# Patient Record
Sex: FEMALE | Race: WHITE | Employment: OTHER | URBAN - METROPOLITAN AREA
[De-identification: names, ages, dates, MRNs, and addresses within clinical notes are randomized per-mention and may not be internally consistent; named-entity substitution may affect disease eponyms.]

---

## 2017-04-29 ENCOUNTER — APPOINTMENT (OUTPATIENT)
Dept: GENERAL RADIOLOGY | Facility: CLINIC | Age: 62
End: 2017-04-29
Attending: EMERGENCY MEDICINE
Payer: COMMERCIAL

## 2017-04-29 ENCOUNTER — HOSPITAL ENCOUNTER (EMERGENCY)
Facility: CLINIC | Age: 62
Discharge: HOME | End: 2017-04-29
Attending: EMERGENCY MEDICINE | Admitting: EMERGENCY MEDICINE
Payer: COMMERCIAL

## 2017-04-29 ENCOUNTER — APPOINTMENT (OUTPATIENT)
Dept: CT IMAGING | Facility: CLINIC | Age: 62
End: 2017-04-29
Attending: EMERGENCY MEDICINE
Payer: COMMERCIAL

## 2017-04-29 VITALS
TEMPERATURE: 97.6 F | BODY MASS INDEX: 22.53 KG/M2 | HEIGHT: 64 IN | WEIGHT: 132 LBS | SYSTOLIC BLOOD PRESSURE: 158 MMHG | HEART RATE: 80 BPM | DIASTOLIC BLOOD PRESSURE: 91 MMHG | OXYGEN SATURATION: 97 % | RESPIRATION RATE: 18 BRPM

## 2017-04-29 DIAGNOSIS — I15.9 SECONDARY HYPERTENSION: ICD-10-CM

## 2017-04-29 LAB
ANION GAP SERPL CALCULATED.3IONS-SCNC: 5 MMOL/L (ref 3–14)
BASOPHILS # BLD AUTO: 0 10E9/L (ref 0–0.2)
BASOPHILS NFR BLD AUTO: 0.6 %
BUN SERPL-MCNC: 15 MG/DL (ref 7–30)
CALCIUM SERPL-MCNC: 8.8 MG/DL (ref 8.5–10.1)
CHLORIDE SERPL-SCNC: 107 MMOL/L (ref 94–109)
CO2 SERPL-SCNC: 30 MMOL/L (ref 20–32)
CREAT SERPL-MCNC: 0.65 MG/DL (ref 0.52–1.04)
DIFFERENTIAL METHOD BLD: NORMAL
EOSINOPHIL # BLD AUTO: 0 10E9/L (ref 0–0.7)
EOSINOPHIL NFR BLD AUTO: 0 %
ERYTHROCYTE [DISTWIDTH] IN BLOOD BY AUTOMATED COUNT: 12.3 % (ref 10–15)
GFR SERPL CREATININE-BSD FRML MDRD: ABNORMAL ML/MIN/1.7M2
GLUCOSE SERPL-MCNC: 103 MG/DL (ref 70–99)
HCT VFR BLD AUTO: 38 % (ref 35–47)
HGB BLD-MCNC: 12.8 G/DL (ref 11.7–15.7)
IMM GRANULOCYTES # BLD: 0 10E9/L (ref 0–0.4)
IMM GRANULOCYTES NFR BLD: 0.9 %
LYMPHOCYTES # BLD AUTO: 0.9 10E9/L (ref 0.8–5.3)
LYMPHOCYTES NFR BLD AUTO: 18.4 %
MCH RBC QN AUTO: 30.7 PG (ref 26.5–33)
MCHC RBC AUTO-ENTMCNC: 33.7 G/DL (ref 31.5–36.5)
MCV RBC AUTO: 91 FL (ref 78–100)
MONOCYTES # BLD AUTO: 0.2 10E9/L (ref 0–1.3)
MONOCYTES NFR BLD AUTO: 4.8 %
NEUTROPHILS # BLD AUTO: 3.5 10E9/L (ref 1.6–8.3)
NEUTROPHILS NFR BLD AUTO: 75.3 %
NRBC # BLD AUTO: 0 10*3/UL
NRBC BLD AUTO-RTO: 0 /100
PLATELET # BLD AUTO: 215 10E9/L (ref 150–450)
POTASSIUM SERPL-SCNC: 3.5 MMOL/L (ref 3.4–5.3)
RBC # BLD AUTO: 4.17 10E12/L (ref 3.8–5.2)
SODIUM SERPL-SCNC: 142 MMOL/L (ref 133–144)
TROPONIN I BLD-MCNC: 0 UG/L (ref 0–0.1)
WBC # BLD AUTO: 4.6 10E9/L (ref 4–11)

## 2017-04-29 PROCEDURE — 70450 CT HEAD/BRAIN W/O DYE: CPT

## 2017-04-29 PROCEDURE — 85025 COMPLETE CBC W/AUTO DIFF WBC: CPT | Performed by: EMERGENCY MEDICINE

## 2017-04-29 PROCEDURE — 99285 EMERGENCY DEPT VISIT HI MDM: CPT | Mod: 25

## 2017-04-29 PROCEDURE — 80048 BASIC METABOLIC PNL TOTAL CA: CPT | Performed by: EMERGENCY MEDICINE

## 2017-04-29 PROCEDURE — 71020 XR CHEST 2 VW: CPT

## 2017-04-29 PROCEDURE — 84484 ASSAY OF TROPONIN QUANT: CPT

## 2017-04-29 PROCEDURE — 93005 ELECTROCARDIOGRAM TRACING: CPT

## 2017-04-29 RX ORDER — LISINOPRIL 10 MG/1
10 TABLET ORAL DAILY
Qty: 14 TABLET | Refills: 0 | Status: SHIPPED | OUTPATIENT
Start: 2017-04-29 | End: 2017-05-01

## 2017-04-29 ASSESSMENT — ENCOUNTER SYMPTOMS
HEADACHES: 1
SHORTNESS OF BREATH: 0

## 2017-04-29 NOTE — DISCHARGE INSTRUCTIONS
Discharge Instructions: Taking Your Blood Pressure  Blood pressure is the force of blood as it moves from the heart through the blood vessels. You can take your own blood pressure reading using a digital monitor. Take readings as often as your health care provider instructs. Take your readings each time in the same way, using the same arm. Here are guidelines for taking your blood pressure.  1. Relax      Wait at least a half hour after smoking, eating, or exercising. Do not drink coffee, tea, soda, or other caffeinated beverages before checking your blood pressure.     Sit comfortably at a table. Place the monitor near you.    Rest for a few minutes before you begin.  2. Wrap the cuff      Place your arm on the table, palm up. Put your arm in a position that is level with your heart. Wrap the cuff around your upper arm, just above your elbow. It s best to wrap the cuff on bare skin, not over clothing.    Make sure your cuff fits. If it doesn t wrap around your upper arm, order a larger cuff. A cuff that is too large or too small can result in an inaccurate blood pressure reading.  3. Inflate the cuff      Pump the cuff until the scale reads 200. If you have a self-inflating cuff, push the button that starts the pump.    The cuff will tighten, then loosen.    The numbers will change. When they stop changing, your blood pressure reading will appear.    If you get a reading that is too high or too low for you, relax for a few minutes. Then do the test again.    4. Write down the results    Write down your blood pressure numbers. Fadi the date and time. Keep your results in one place, such as a notebook.    Remove the cuff from your arm. Turn off the machine.       0298-8729 [employer]. [address] [place] [address], [place] [address]. All rights reserved. This information is not intended as a substitute for professional medical care. Always follow your healthcare professional's instructions.          High  Blood Pressure, New, Begin Treatment  Your blood pressure was high enough today to start treatment with medicines. Often health care providers don t know what causes high blood pressure (hypertension). But it can be controlled with lifestyle changes and medicines. High blood pressure usually has no symptoms. But it can sometimes cause headache, dizziness, blurred vision, a rushing sound in your ears, chest pain, or shortness of breath. But even without symptoms, high blood pressure that s not treated raises your risk for heart attack and stroke. High blood pressure is a serious health risk and shouldn t be ignored.    A blood pressure reading is made up of two numbers: a higher number over a lower number. The top number is the systolic pressure. The bottom number is the diastolic pressure. A normal blood pressure is less than 120 over less than 80.  High blood pressure is when either the top number is 140 or higher, or the bottom number is 90 or higher. This must be the result when taking your blood pressure a number of times. The blood pressures between normal and high are called prehypertension.  Home care  If you have high blood pressure, you should do what is listed below to lower your blood pressure. If you are taking medicines for high blood pressure, these methods may reduce or end your need for medicines in the future.    Begin a weight-loss program if you are overweight.    Cut back on how much salt you get in your diet. Here s how to do this:    Don t eat foods that have a lot of salt. These include olives, pickles, smoked meats, and salted potato chips.    Don t add salt to your food at the table.    Use only small amounts of salt when cooking.    Begin an exercise program. Talk with your health care provider about the type of exercise program that would be best for you. It doesn't have to be hard. Even brisk walking for 20 minutes 3 times a week is a good form of exercise.    Don t take medicines that  have heart stimulants. This includes many cold and sinus decongestant pills and sprays, as well as diet pills. Check the warnings about hypertension on the label. Stimulants such as amphetamine or cocaine could be lethal for someone with high blood pressure. Never take these.    Limit how much caffeine you get in your diet. Switch to caffeine-free products.    Stop smoking. If you are a long-time smoker, this can be hard. Enroll in a stop-smoking program to make it more likely that you will quit for good.    Learn how to handle stress. This is an important part of any program to lower blood pressure. Learn about relaxation methods like meditation, yoga, or biofeedback.    If your provider prescribed medicines, take them exactly as directed. Missing doses may cause your blood pressure get out of control.    Consider buying an automatic blood pressure machine. You can get one of these at most pharmacies. Use this to watch your blood pressure at home. Give the results to your provider.  Follow-up care  Because a new blood pressure medicine was started today, it s important that you have your blood pressure rechecked. This is to make sure that the medicine is working and that you have no serious side effects. Unless told otherwise, follow up with your health care provider or this facility within the next 3 days.  When to seek medical advice  Call your health care provider right away if any of these occur:    Chest pain or shortness of breath    Severe headache    Throbbing or rushing sound in the ears    Nosebleed    Sudden severe pain in your belly (abdomen)    Extreme drowsiness, confusion, or fainting    Dizziness or dizziness with a spinning sensation (vertigo)    Weakness of an arm or leg or one side of the face    You have problems speaking or seeing     6765-1723 [employer]. [address] [place] [address], [place] [address]. All rights reserved. This information is not intended as a substitute for  professional medical care. Always follow your healthcare professional's instructions.

## 2017-04-29 NOTE — ED AVS SNAPSHOT
[facility] Emergency Department    [address]    [facility] [address]    Phone:  [phone]    Fax:  [phone]                                       Le Vora   MRN: [id number]    Department:  [facility] Emergency Department   Date of Visit:  4/29/2017           After Visit Summary Signature Page     I have received my discharge instructions, and my questions have been answered. I have discussed any challenges I see with this plan with the nurse or doctor.    ..........................................................................................................................................  Patient/Patient Representative Signature      ..........................................................................................................................................  Patient Representative Print Name and Relationship to Patient    ..................................................               ................................................  Date                                            Time    ..........................................................................................................................................  Reviewed by Signature/Title    ...................................................              ..............................................  Date                                                            Time

## 2017-04-29 NOTE — ED AVS SNAPSHOT
[facility] Emergency Department    [address]    [facility] [address]    Phone:  [phone]    Fax:  [phone]                                       Le Vora   MRN: [id number]    Department:  [facility] Emergency Department   Date of Visit:  4/29/2017           Patient Information     Date Of Birth          1955        Your diagnoses for this visit were:     Secondary hypertension        You were seen by Pablo Medina MD.      Follow-up Information     Follow up with Brii Sherwood MD In 2 days.    Specialty:  Internal Medicine    Contact information:    [facility]  [address]  [facility] [address]  [phone]          Discharge Instructions         Discharge Instructions: Taking Your Blood Pressure  Blood pressure is the force of blood as it moves from the heart through the blood vessels. You can take your own blood pressure reading using a digital monitor. Take readings as often as your health care provider instructs. Take your readings each time in the same way, using the same arm. Here are guidelines for taking your blood pressure.  1. Relax      Wait at least a half hour after smoking, eating, or exercising. Do not drink coffee, tea, soda, or other caffeinated beverages before checking your blood pressure.     Sit comfortably at a table. Place the monitor near you.    Rest for a few minutes before you begin.  2. Wrap the cuff      Place your arm on the table, palm up. Put your arm in a position that is level with your heart. Wrap the cuff around your upper arm, just above your elbow. It s best to wrap the cuff on bare skin, not over clothing.    Make sure your cuff fits. If it doesn t wrap around your upper arm, order a larger cuff. A cuff that is too large or too small can result in an inaccurate blood pressure reading.  3. Inflate the cuff      Pump the cuff until the scale reads 200. If you have a self-inflating  cuff, push the button that starts the pump.    The cuff will tighten, then loosen.    The numbers will change. When they stop changing, your blood pressure reading will appear.    If you get a reading that is too high or too low for you, relax for a few minutes. Then do the test again.    4. Write down the results    Write down your blood pressure numbers. Fadi the date and time. Keep your results in one place, such as a notebook.    Remove the cuff from your arm. Turn off the machine.       3872-9084 [employer]. [address] [place] [address]. All rights reserved. This information is not intended as a substitute for professional medical care. Always follow your healthcare professional's instructions.          High Blood Pressure, New, Begin Treatment  Your blood pressure was high enough today to start treatment with medicines. Often health care providers don t know what causes high blood pressure (hypertension). But it can be controlled with lifestyle changes and medicines. High blood pressure usually has no symptoms. But it can sometimes cause headache, dizziness, blurred vision, a rushing sound in your ears, chest pain, or shortness of breath. But even without symptoms, high blood pressure that s not treated raises your risk for heart attack and stroke. High blood pressure is a serious health risk and shouldn t be ignored.    A blood pressure reading is made up of two numbers: a higher number over a lower number. The top number is the systolic pressure. The bottom number is the diastolic pressure. A normal blood pressure is less than 120 over less than 80.  High blood pressure is when either the top number is 140 or higher, or the bottom number is 90 or higher. This must be the result when taking your blood pressure a number of times. The blood pressures between normal and high are called prehypertension.  Home care  If you have high blood pressure, you should do what is listed below to  lower your blood pressure. If you are taking medicines for high blood pressure, these methods may reduce or end your need for medicines in the future.    Begin a weight-loss program if you are overweight.    Cut back on how much salt you get in your diet. Here s how to do this:    Don t eat foods that have a lot of salt. These include olives, pickles, smoked meats, and salted potato chips.    Don t add salt to your food at the table.    Use only small amounts of salt when cooking.    Begin an exercise program. Talk with your health care provider about the type of exercise program that would be best for you. It doesn't have to be hard. Even brisk walking for 20 minutes 3 times a week is a good form of exercise.    Don t take medicines that have heart stimulants. This includes many cold and sinus decongestant pills and sprays, as well as diet pills. Check the warnings about hypertension on the label. Stimulants such as amphetamine or cocaine could be lethal for someone with high blood pressure. Never take these.    Limit how much caffeine you get in your diet. Switch to caffeine-free products.    Stop smoking. If you are a long-time smoker, this can be hard. Enroll in a stop-smoking program to make it more likely that you will quit for good.    Learn how to handle stress. This is an important part of any program to lower blood pressure. Learn about relaxation methods like meditation, yoga, or biofeedback.    If your provider prescribed medicines, take them exactly as directed. Missing doses may cause your blood pressure get out of control.    Consider buying an automatic blood pressure machine. You can get one of these at most pharmacies. Use this to watch your blood pressure at home. Give the results to your provider.  Follow-up care  Because a new blood pressure medicine was started today, it s important that you have your blood pressure rechecked. This is to make sure that the medicine is working and that you have  no serious side effects. Unless told otherwise, follow up with your health care provider or this facility within the next 3 days.  When to seek medical advice  Call your health care provider right away if any of these occur:    Chest pain or shortness of breath    Severe headache    Throbbing or rushing sound in the ears    Nosebleed    Sudden severe pain in your belly (abdomen)    Extreme drowsiness, confusion, or fainting    Dizziness or dizziness with a spinning sensation (vertigo)    Weakness of an arm or leg or one side of the face    You have problems speaking or seeing     7517-0499 [employer]. [address] [place] [address]. All rights reserved. This information is not intended as a substitute for professional medical care. Always follow your healthcare professional's instructions.          Future Appointments        Provider Department Dept Phone Center    5/1/2017 3:00 PM Coleman Morgan MD [facility] [phone] [place]      24 Hour Appointment Hotline       To make an appointment at any [facility], call [phone] ([phone]). If you don't have a family doctor or clinic, we will help you find one. [facility] ([facility] are conveniently located to serve the needs of you and your family.             Review of your medicines      START taking        Dose / Directions Last dose taken    lisinopril 10 MG tablet   Commonly known as:  PRINIVIL/ZESTRIL   Dose:  10 mg   Quantity:  14 tablet        Take 1 tablet (10 mg) by mouth daily for 14 doses   Refills:  0          Our records show that you are taking the medicines listed below. If these are incorrect, please call your family doctor or clinic.        Dose / Directions Last dose taken    ADVIL PO   Dose:  200 mg        Take 200 mg by mouth   Refills:  0        aspirin 325 MG tablet        Take by mouth daily   Refills:  0        CALTRATE 600+D 600-400 MG-UNIT Chew   Dose:  1 chew tab   Quantity:  180 tablet    Generic drug:  calcium carbonate-vitamin D        Take 1 chew tab by mouth daily.   Refills:  3        conjugated estrogens cream   Commonly known as:  PREMARIN   Dose:  1 g   Quantity:  42.5 g        Place 1 g vaginally twice a week   Refills:  12        levothyroxine 100 MCG tablet   Commonly known as:  SYNTHROID/LEVOTHROID   Dose:  100 mcg   Quantity:  90 tablet        Take 1 tablet (100 mcg) by mouth daily   Refills:  3        TYLENOL PO   Dose:  325 mg        Take 325 mg by mouth   Refills:  0                Prescriptions were sent or printed at these locations (1 Prescription)                   Other Prescriptions                Printed at Department/Unit printer (1 of 1)         lisinopril (PRINIVIL/ZESTRIL) 10 MG tablet                Procedures and tests performed during your visit     Basic metabolic panel    CBC + differential    CT Head w/o Contrast    Troponin POCT    XR Chest 2 Views      Orders Needing Specimen Collection     None      Pending Results     No orders found from 4/27/2017 to 4/30/2017.            Pending Culture Results     No orders found from 4/27/2017 to 4/30/2017.            Test Results From Your Hospital Stay        4/29/2017 11:55 AM      Component Results     Component Value Ref Range & Units Status    WBC 4.6 4.0 - 11.0 10e9/L Final    RBC Count 4.17 3.8 - 5.2 10e12/L Final    Hemoglobin 12.8 11.7 - 15.7 g/dL Final    Hematocrit 38.0 35.0 - 47.0 % Final    MCV 91 78 - 100 fl Final    MCH 30.7 26.5 - 33.0 pg Final    MCHC 33.7 31.5 - 36.5 g/dL Final    RDW 12.3 10.0 - 15.0 % Final    Platelet Count 215 150 - 450 10e9/L Final    Diff Method Automated Method  Final    % Neutrophils 75.3 % Final    % Lymphocytes 18.4 % Final    % Monocytes 4.8 % Final    % Eosinophils 0.0 % Final    % Basophils 0.6 % Final    % Immature Granulocytes 0.9 % Final    Nucleated RBCs 0 0 /100 Final    Absolute Neutrophil 3.5 1.6 - 8.3 10e9/L Final    Absolute Lymphocytes 0.9 0.8 - 5.3 10e9/L Final     Absolute Monocytes 0.2 0.0 - 1.3 10e9/L Final    Absolute Eosinophils 0.0 0.0 - 0.7 10e9/L Final    Absolute Basophils 0.0 0.0 - 0.2 10e9/L Final    Abs Immature Granulocytes 0.0 0 - 0.4 10e9/L Final    Absolute Nucleated RBC 0.0  Final         4/29/2017 12:09 PM      Component Results     Component Value Ref Range & Units Status    Sodium 142 133 - 144 mmol/L Final    Potassium 3.5 3.4 - 5.3 mmol/L Final    Chloride 107 94 - 109 mmol/L Final    Carbon Dioxide 30 20 - 32 mmol/L Final    Anion Gap 5 3 - 14 mmol/L Final    Glucose 103 (H) 70 - 99 mg/dL Final    Urea Nitrogen 15 7 - 30 mg/dL Final    Creatinine 0.65 0.52 - 1.04 mg/dL Final    GFR Estimate >90  Non  GFR Calc   >60 mL/min/1.7m2 Final    GFR Estimate If Black >90   GFR Calc   >60 mL/min/1.7m2 Final    Calcium 8.8 8.5 - 10.1 mg/dL Final         4/29/2017 12:27 PM      Narrative     CT HEAD W/O CONTRAST  4/29/2017 12:23 PM    HISTORY:  headache hypertension    Radiation dose for this scan was reduced using automated exposure  control, adjustment of the mA and/or kV according to patient size, or  iterative reconstruction technique.    FINDINGS:  Cerebral ventricles and cortical sulci appear within normal  limits. There is no evidence of acute intracranial hemorrhage. There  is no mass effect or shift of the midline. There is no definite CT  evidence of acute stroke. The visualized portions of the paranasal  sinuses are clear. The osseous calvarium appears within normal limits  as imaged transversely.        Impression     IMPRESSION:  Negative unenhanced head CT.    WANDA LARKIN MD         4/29/2017 12:48 PM      Narrative     XR CHEST 2 VW 4/29/2017 12:43 PM     HISTORY: htn        Impression     IMPRESSION: Negative exam.    WANDA LARKIN MD         4/29/2017 12:06 PM      Component Results     Component Value Ref Range & Units Status    Troponin I 0.00 0.00 - 0.10 ug/L Final                Clinical Quality Measure: Blood  Pressure Screening     Your blood pressure was checked while you were in the emergency department today. The last reading we obtained was  BP: (!) 158/91 (Simultaneous filing. User may not have seen previous data.) . Please read the guidelines below about what these numbers mean and what you should do about them.  If your systolic blood pressure (the top number) is less than 120 and your diastolic blood pressure (the bottom number) is less than 80, then your blood pressure is normal. There is nothing more that you need to do about it.  If your systolic blood pressure (the top number) is 120-139 or your diastolic blood pressure (the bottom number) is 80-89, your blood pressure may be higher than it should be. You should have your blood pressure rechecked within a year by a primary care provider.  If your systolic blood pressure (the top number) is 140 or greater or your diastolic blood pressure (the bottom number) is 90 or greater, you may have high blood pressure. High blood pressure is treatable, but if left untreated over time it can put you at risk for heart attack, stroke, or kidney failure. You should have your blood pressure rechecked by a primary care provider within the next 4 weeks.  If your provider in the emergency department today gave you specific instructions to follow-up with your doctor or provider even sooner than that, you should follow that instruction and not wait for up to 4 weeks for your follow-up visit.        Thank you for choosing [facility]       Thank you for choosing [facility] for your care. Our goal is always to provide you with excellent care. Hearing back from our patients is one way we can continue to improve our services. Please take a few minutes to complete the written survey that you may receive in the mail after you visit with us. Thank you!        [employer] Information     [employer] gives you secure access to your electronic health record. If you see a primary care provider, you can  also send messages to your care team and make appointments. If you have questions, please call your primary care clinic.  If you do not have a primary care provider, please call [phone] and they will assist you.        Care EveryWhere ID     This is your Care EveryWhere ID. This could be used by other organizations to access your [facility] medical records  [id number]        After Visit Summary       This is your record. Keep this with you and show to your community pharmacist(s) and doctor(s) at your next visit.

## 2017-04-29 NOTE — ED NOTES
Patient presents with complaints of hypertension. Patient states that she has no history of hypertension. Also complaints of headache as well. ABC intact without need for intervention.

## 2017-04-29 NOTE — ED PROVIDER NOTES
History     Chief Complaint:  Hypertension      HPI   Le Vora is a 61 year old female who presents with concerns for hypertension. The patient developed a throbbing headache yesterday. At work today the patient checked her shekhar and found it to be 180/99. Patient measured her blood pressure three times and presented to the ED for evaluation. She states that she has been thinking about her blood pressure frequently as of late. She has spoke with her PCP about it about 9 months ago about this but is not currently on any antihypertensives. Per chart review the patient's blood pressure was 174/84 in 10/2016 during an ED visit. She states that it was in the 150's about 9 months ago but she was not been checking it regularly since. The patient denies any chest pain, shortness of breath vision changes or any other symptoms.     Allergies:  Carbocaine [Mepivacaine]  Tetracycline      Medications:    conjugated estrogens (PREMARIN) vaginal cream  levothyroxine (SYNTHROID,LEVOTHROID) 100 MCG tablet  aspirin 325 MG tablet  Acetaminophen (TYLENOL PO)  Ibuprofen (ADVIL PO)  calcium carbonate-vitamin D (CALTRATE 600+D) 600-400 MG-UNIT CHEW    Past Medical History:    Basal cell carcinoma   Mitral valve disorder  Slow transit constipation   Tension headache   Hypothyroidism     Past Surgical History:    History reviewed. No pertinent past surgical history.     Family History:    Mother - diabetes, CAD  Father - myeloma   Brother - hypertension     Social History:  Marital Status:     Smoking status: Never smoker  Alcohol use: No    PCP: Dr. Sherwood    Review of Systems   Eyes: Negative for visual disturbance.   Respiratory: Negative for shortness of breath.    Cardiovascular: Negative for chest pain.   Neurological: Positive for headaches.   All other systems reviewed and are negative.      Physical Exam     Patient Vitals for the past 24 hrs:   BP Temp Pulse Heart Rate Resp SpO2 Height Weight   04/29/17 1303 (!)  "158/91 - - - - 97 % - -   04/29/17 1155 (!) 164/94 - - - - 99 % - -   04/29/17 1140 160/89 - - - - 98 % - -   04/29/17 1131 (!) 181/103 97.6  F (36.4  C) 80 80 18 99 % 1.626 m (5' 4\") 59.9 kg (132 lb)   04/29/17 1130 (!) 181/103 - - - - - - -      Physical Exam  Vital signs and nursing notes reviewed.     Constitutional: laying on gurney appears comfortable  HENT: Oropharynx is clear and moist  Eyes: Conjunctivae are normal bilaterally. Pupils equal  Neck: normal range of motion. No carotid bruits.   Cardiovascular: Normal rate, regular rhythm, normal heart sounds. Equal radial pulses.   Pulmonary/Chest: Effort normal and breath sounds normal. No respiratory distress.   Abdominal: Soft. Bowel sounds are normal. No tenderness to palpation. No rebound or guarding.   Musculoskeletal: No joint swelling or edema.   Neurological: Alert and oriented. No focal weakness. Normal deep tendon reflexes. No encephalopathy.   Skin: Skin is warm and dry. No rash noted.   Psych: normal affect     Emergency Department Course     Imaging:  Radiographic findings were communicated with the patient who voiced understanding of the findings.    XR Chest 2 views  IMPRESSION: Negative exam.  Report per radiology.     CT Head without contrast  IMPRESSION:  Negative unenhanced head CT.  Report per radiology.     Laboratory:  CBC:  WBC 4.6, HGB 12.8, , otherwise WNL   BMP: Glucose 103 (H), otherwise WNL (Creatinine 0.65)  Troponin: 0.00     Emergency Department Course:  Nursing notes and vitals reviewed.  (5504) I performed an exam of the patient as documented above.    Blood was drawn from the patient. This was sent for laboratory testing, findings above.   The patient was sent for a chest xray and head CT while in the emergency department, findings above.      Findings and plan explained to the patient. Patient discharged home with instructions regarding supportive care, medications, and reasons to return. The importance of close " follow-up was reviewed. The patient was prescribed lisinopril.      Impression & Plan      Medical Decision Making:  Le Vora is a 61 year old female who presents with high blood pressure and intermittent throbbing sensation in her head and not feeling normal at work today. On examination she had a normal neurologic exam. She had no encephalopathy or other concerning findings. Blood pressure initially was in the 180/110's but with continued monitoring in the ED it dropped to the upper 150's systolically without medication treatment. Multiple tests were obtained which showed no signs of hypertensive strain or emergency. I discussed with the patient at length about her blood pressure and she has likely been having higher blood pressure readings over the last several months. I recommended starting on low dose lisinopril daily and have her follow up with her PCP on Monday. Patient feels better as time has past here in the ED and I felt she could be safely discharged home. Lab tests, chest xray and CT are all unremarkable. She has no chest pain, shortness of breath or any other concerning findings. I felt she could be safely discharged to home. Patient understands the plan and knows reasons to return.     Diagnosis:    ICD-10-CM   1. Secondary hypertension I15.9       Disposition:  Patient is discharged to home.     Discharge Medication List as of 4/29/2017  1:31 PM      START taking these medications    Details   lisinopril (PRINIVIL/ZESTRIL) 10 MG tablet Take 1 tablet (10 mg) by mouth daily for 14 doses, Disp-14 tablet, R-0, Local Print               Tim Aguirre  4/29/2017   [facility] EMERGENCY DEPARTMENT    I, Tim Aguirre, am serving as a scribe on 4/29/2017 at 11:48 AM to personally document services performed by Dr. Medina based on my observations and the provider's statements to me.        Pablo Medina MD  04/29/17 3443

## 2017-05-01 ENCOUNTER — OFFICE VISIT (OUTPATIENT)
Dept: INTERNAL MEDICINE | Facility: CLINIC | Age: 62
End: 2017-05-01
Payer: COMMERCIAL

## 2017-05-01 VITALS
TEMPERATURE: 98.8 F | BODY MASS INDEX: 23.52 KG/M2 | HEART RATE: 97 BPM | OXYGEN SATURATION: 97 % | DIASTOLIC BLOOD PRESSURE: 80 MMHG | HEIGHT: 64 IN | SYSTOLIC BLOOD PRESSURE: 130 MMHG | WEIGHT: 137.8 LBS

## 2017-05-01 DIAGNOSIS — I10 ESSENTIAL HYPERTENSION: Primary | ICD-10-CM

## 2017-05-01 PROCEDURE — 99213 OFFICE O/P EST LOW 20 MIN: CPT | Performed by: FAMILY MEDICINE

## 2017-05-01 RX ORDER — LISINOPRIL 10 MG/1
10 TABLET ORAL DAILY
Qty: 30 TABLET | Refills: 2 | Status: SHIPPED | OUTPATIENT
Start: 2017-05-01 | End: 2017-10-02 | Stop reason: DRUGHIGH

## 2017-05-01 NOTE — PROGRESS NOTES
"CHIEF COMPLAINT    F/U elevated BP.      HISTORY    Le developed KNIGHT and hypertension. She was seen in E R 4-29. W/U showed neg Troponin and normal Glu and renal fcn. She was started on Lisinopril 10 mg which she is tolerating well.She has minimal HA now.    Patient Active Problem List   Diagnosis     CARDIOVASCULAR SCREENING; LDL GOAL LESS THAN 160     Advanced directives, counseling/discussion     Osteoporosis     Hypothyroidism due to acquired atrophy of thyroid     Current Outpatient Prescriptions   Medication Sig Dispense Refill     lisinopril (PRINIVIL/ZESTRIL) 10 MG tablet Take 1 tablet (10 mg) by mouth daily 30 tablet 2     conjugated estrogens (PREMARIN) vaginal cream Place 1 g vaginally twice a week 42.5 g 12     levothyroxine (SYNTHROID,LEVOTHROID) 100 MCG tablet Take 1 tablet (100 mcg) by mouth daily 90 tablet 3     aspirin 325 MG tablet Take by mouth daily       Acetaminophen (TYLENOL PO) Take 325 mg by mouth       Ibuprofen (ADVIL PO) Take 200 mg by mouth       calcium carbonate-vitamin D (CALTRATE 600+D) 600-400 MG-UNIT CHEW Take 1 chew tab by mouth daily. 180 tablet 3     [DISCONTINUED] lisinopril (PRINIVIL/ZESTRIL) 10 MG tablet Take 1 tablet (10 mg) by mouth daily for 14 doses 14 tablet 0       REVIEW OF SYSTEMS    No CP  No SOB  No palpitation      Past Medical History:   Diagnosis Date     Basal cell carcinoma nos 2010     Mitral valve disorders     reported MVP; no regurg     Slow transit constipation     partial intussecption seen on sigmoid     Tension headache      Unspecified hypothyroidism        EXAM  /80 (BP Location: Right arm, Patient Position: Chair, Cuff Size: Adult Regular)  Pulse 97  Temp 98.8  F (37.1  C) (Oral)  Ht 5' 4\" (1.626 m)  Wt 137 lb 12.8 oz (62.5 kg)  LMP 03/01/2004  SpO2 97%  BMI 23.65 kg/m2      (I10) Essential hypertension  (primary encounter diagnosis)  Comment: med discussed  Plan: lisinopril (PRINIVIL/ZESTRIL) 10 MG tablet            Monitor BP 2-3 " times a week.    See Dr Sherwood in 1-2 months.

## 2017-05-01 NOTE — NURSING NOTE
"Chief Complaint   Patient presents with     RECHECK     She went to the [facility] 4/29/17 for elevated BP. She's feeling better now. Was put on BP med.       Initial /80 (BP Location: Right arm, Patient Position: Chair, Cuff Size: Adult Regular)  Pulse 97  Temp 98.8  F (37.1  C) (Oral)  Ht 5' 4\" (1.626 m)  Wt 137 lb 12.8 oz (62.5 kg)  LMP 03/01/2004  SpO2 97%  BMI 23.65 kg/m2 Estimated body mass index is 23.65 kg/(m^2) as calculated from the following:    Height as of this encounter: 5' 4\" (1.626 m).    Weight as of this encounter: 137 lb 12.8 oz (62.5 kg).  Medication Reconciliation: complete   Syl Fenton MA      "

## 2017-05-01 NOTE — MR AVS SNAPSHOT
"              After Visit Summary   5/1/2017    Le Vora    MRN: [id number]           Patient Information     Date Of Birth          1955        Visit Information        Provider Department      5/1/2017 3:00 PM Coleman Morgan MD [facility]        Today's Diagnoses     Essential hypertension    -  1      Care Instructions    Monitor BP 2-3 times a week.    See Dr Sherwood in 1-2 months.        Follow-ups after your visit        Who to contact     If you have questions or need follow up information about today's clinic visit or your schedule please contact [facility] directly at [phone].  Normal or non-critical lab and imaging results will be communicated to you by Imcompanyhart, letter or phone within 4 business days after the clinic has received the results. If you do not hear from us within 7 days, please contact the clinic through Imcompanyhart or phone. If you have a critical or abnormal lab result, we will notify you by phone as soon as possible.  Submit refill requests through Moblico or call your pharmacy and they will forward the refill request to us. Please allow 3 business days for your refill to be completed.          Additional Information About Your Visit        MyChart Information     Moblico gives you secure access to your electronic health record. If you see a primary care provider, you can also send messages to your care team and make appointments. If you have questions, please call your primary care clinic.  If you do not have a primary care provider, please call [phone] and they will assist you.        Care EveryWhere ID     This is your Care EveryWhere ID. This could be used by other organizations to access your [facility] medical records  [id number]        Your Vitals Were     Pulse Temperature Height Last Period Pulse Oximetry BMI (Body Mass Index)    97 98.8  F (37.1  C) (Oral) 5' 4\" (1.626 m) 03/01/2004 97% 23.65 kg/m2       Blood Pressure from " Last 3 Encounters:   05/01/17 130/80   04/29/17 (!) 158/91   10/09/16 154/84    Weight from Last 3 Encounters:   05/01/17 137 lb 12.8 oz (62.5 kg)   04/29/17 132 lb (59.9 kg)   10/09/16 132 lb (59.9 kg)              Today, you had the following     No orders found for display         Where to get your medicines      These medications were sent to [facility] [place], [place] - [address]  [address] [facility] [address]     Phone:  [phone]     lisinopril 10 MG tablet          Primary Care Provider Office Phone # Fax #    Brii Sherwood [phone]       [facility] [address]  [facility] [address]        Thank you!     Thank you for choosing [facility]  for your care. Our goal is always to provide you with excellent care. Hearing back from our patients is one way we can continue to improve our services. Please take a few minutes to complete the written survey that you may receive in the mail after your visit with us. Thank you!             Your Updated Medication List - Protect others around you: Learn how to safely use, store and throw away your medicines at www.disposemymeds.org.          This list is accurate as of: 5/1/17  3:35 PM.  Always use your most recent med list.                   Brand Name Dispense Instructions for use    ADVIL PO      Take 200 mg by mouth       aspirin 325 MG tablet      Take by mouth daily       CALTRATE 600+D 600-400 MG-UNIT Chew   Generic drug:  calcium carbonate-vitamin D     180 tablet    Take 1 chew tab by mouth daily.       conjugated estrogens cream    PREMARIN    42.5 g    Place 1 g vaginally twice a week       levothyroxine 100 MCG tablet    SYNTHROID/LEVOTHROID    90 tablet    Take 1 tablet (100 mcg) by mouth daily       lisinopril 10 MG tablet    PRINIVIL/ZESTRIL    30 tablet    Take 1 tablet (10 mg) by mouth daily       TYLENOL PO      Take 325 mg by mouth

## 2017-06-08 ENCOUNTER — OFFICE VISIT (OUTPATIENT)
Dept: INTERNAL MEDICINE | Facility: CLINIC | Age: 62
End: 2017-06-08
Payer: COMMERCIAL

## 2017-06-08 VITALS
RESPIRATION RATE: 16 BRPM | DIASTOLIC BLOOD PRESSURE: 88 MMHG | WEIGHT: 133.5 LBS | TEMPERATURE: 97.8 F | HEART RATE: 80 BPM | OXYGEN SATURATION: 100 % | BODY MASS INDEX: 22.79 KG/M2 | SYSTOLIC BLOOD PRESSURE: 138 MMHG | HEIGHT: 64 IN

## 2017-06-08 DIAGNOSIS — I10 BENIGN ESSENTIAL HYPERTENSION: Primary | ICD-10-CM

## 2017-06-08 LAB
CREAT UR-MCNC: 68 MG/DL
MICROALBUMIN UR-MCNC: 14 MG/L
MICROALBUMIN/CREAT UR: 20.35 MG/G CR (ref 0–25)

## 2017-06-08 PROCEDURE — 82043 UR ALBUMIN QUANTITATIVE: CPT | Performed by: INTERNAL MEDICINE

## 2017-06-08 PROCEDURE — 99214 OFFICE O/P EST MOD 30 MIN: CPT | Performed by: INTERNAL MEDICINE

## 2017-06-08 RX ORDER — LISINOPRIL 10 MG/1
10 TABLET ORAL DAILY
COMMUNITY
End: 2017-06-08

## 2017-06-08 NOTE — MR AVS SNAPSHOT
"              After Visit Summary   6/8/2017    Le Vora    MRN: [id number]           Patient Information     Date Of Birth          1955        Visit Information        Provider Department      6/8/2017 10:40 AM Brii Sherwood MD [facility]        Today's Diagnoses     Benign essential hypertension    -  1       Follow-ups after your visit        Who to contact     If you have questions or need follow up information about today's clinic visit or your schedule please contact [facility] directly at [phone].  Normal or non-critical lab and imaging results will be communicated to you by MyChart, letter or phone within 4 business days after the clinic has received the results. If you do not hear from us within 7 days, please contact the clinic through [employer] or phone. If you have a critical or abnormal lab result, we will notify you by phone as soon as possible.  Submit refill requests through JewelStreet or call your pharmacy and they will forward the refill request to us. Please allow 3 business days for your refill to be completed.          Additional Information About Your Visit        MyChart Information     [employer]elStreet gives you secure access to your electronic health record. If you see a primary care provider, you can also send messages to your care team and make appointments. If you have questions, please call your primary care clinic.  If you do not have a primary care provider, please call [phone] and they will assist you.        Care EveryWhere ID     This is your Care EveryWhere ID. This could be used by other organizations to access your [facility] medical records  [id number]        Your Vitals Were     Pulse Temperature Respirations Height Last Period Pulse Oximetry    80 97.8  F (36.6  C) (Oral) 16 5' 4\" (1.626 m) 03/01/2004 100%    BMI (Body Mass Index)                   22.92 kg/m2            Blood Pressure from Last 3 Encounters:   06/08/17 138/88 "   05/01/17 130/80   04/29/17 (!) 158/91    Weight from Last 3 Encounters:   06/08/17 133 lb 8 oz (60.6 kg)   05/01/17 137 lb 12.8 oz (62.5 kg)   04/29/17 132 lb (59.9 kg)              We Performed the Following     Albumin Random Urine Quantitative          Today's Medication Changes          These changes are accurate as of: 6/8/17 12:39 PM.  If you have any questions, ask your nurse or doctor.               These medicines have changed or have updated prescriptions.        Dose/Directions    lisinopril 10 MG tablet   Commonly known as:  PRINIVIL/ZESTRIL   This may have changed:  Another medication with the same name was removed. Continue taking this medication, and follow the directions you see here.   Used for:  Essential hypertension   Changed by:  Coleman Morgan MD        Dose:  10 mg   Take 1 tablet (10 mg) by mouth daily   Quantity:  30 tablet   Refills:  2                Primary Care Provider Office Phone # Fax #    Brii Sherwood [phone]       [facility] [address]  [facility] [address]        Thank you!     Thank you for choosing [facility]  for your care. Our goal is always to provide you with excellent care. Hearing back from our patients is one way we can continue to improve our services. Please take a few minutes to complete the written survey that you may receive in the mail after your visit with us. Thank you!             Your Updated Medication List - Protect others around you: Learn how to safely use, store and throw away your medicines at www.disposemymeds.org.          This list is accurate as of: 6/8/17 12:39 PM.  Always use your most recent med list.                   Brand Name Dispense Instructions for use    ADVIL PO      Take 200 mg by mouth       aspirin 325 MG tablet      Take by mouth daily       CALTRATE 600+D 600-400 MG-UNIT Chew   Generic drug:  calcium carbonate-vitamin D     180 tablet    Take 1 chew tab by mouth daily.        conjugated estrogens cream    PREMARIN    42.5 g    Place 1 g vaginally twice a week       levothyroxine 100 MCG tablet    SYNTHROID/LEVOTHROID    90 tablet    Take 1 tablet (100 mcg) by mouth daily       lisinopril 10 MG tablet    PRINIVIL/ZESTRIL    30 tablet    Take 1 tablet (10 mg) by mouth daily       TYLENOL PO      Take 325 mg by mouth       VITAMIN D (CHOLECALCIFEROL) PO      Take 1,000 Units by mouth daily

## 2017-06-08 NOTE — NURSING NOTE
"Chief Complaint   Patient presents with     Recheck Medication     new on Lisinopril- f/u on BP        Initial BP (!) 150/98  Pulse 80  Temp 97.8  F (36.6  C) (Oral)  Resp 16  Ht 5' 4\" (1.626 m)  Wt 133 lb 8 oz (60.6 kg)  LMP 03/01/2004  SpO2 100%  BMI 22.92 kg/m2 Estimated body mass index is 22.92 kg/(m^2) as calculated from the following:    Height as of this encounter: 5' 4\" (1.626 m).    Weight as of this encounter: 133 lb 8 oz (60.6 kg).  Medication Reconciliation: complete      Oma Rosa CMA      "

## 2017-06-08 NOTE — PROGRESS NOTES
"  SUBJECTIVE:                                                    Le Vora is a 62 year old female who presents to clinic today for the following health issues:    She was at ED at end of April with headache and had high BP. It was still high on follow up last month. She is on lisinopril 10 mg. She is tolerating this well.   Hypertension Follow-up      Outpatient blood pressures are being checked at Work.  Results are 168/94, 138/88, 147/94.    Low Salt Diet: low salt       Amount of exercise or physical activity: 6-7 days/week for an average of 15-30 minutes- biking     Problems taking medications regularly: No    Medication side effects: none    Diet: low salt      She reports she still can hear a \"swishing\" noise in her head, mostly on the right. This is frequently when first lying down but can be sitting up. It is not associated with pain, does not check BP with it but she was having it when she went to ED.   No focal neurologic symptoms.   No other symptoms.       Patient Active Problem List   Diagnosis     CARDIOVASCULAR SCREENING; LDL GOAL LESS THAN 160     Advanced directives, counseling/discussion     Osteoporosis     Hypothyroidism due to acquired atrophy of thyroid     Benign essential hypertension     Current Outpatient Prescriptions   Medication Sig Dispense Refill     VITAMIN D, CHOLECALCIFEROL, PO Take 1,000 Units by mouth daily       lisinopril (PRINIVIL/ZESTRIL) 10 MG tablet Take 1 tablet (10 mg) by mouth daily 30 tablet 2     conjugated estrogens (PREMARIN) vaginal cream Place 1 g vaginally twice a week 42.5 g 12     levothyroxine (SYNTHROID,LEVOTHROID) 100 MCG tablet Take 1 tablet (100 mcg) by mouth daily 90 tablet 3     aspirin 325 MG tablet Take by mouth daily       Acetaminophen (TYLENOL PO) Take 325 mg by mouth       Ibuprofen (ADVIL PO) Take 200 mg by mouth       calcium carbonate-vitamin D (CALTRATE 600+D) 600-400 MG-UNIT CHEW Take 1 chew tab by mouth daily. 180 tablet 3     " "[DISCONTINUED] lisinopril (PRINIVIL/ZESTRIL) 10 MG tablet Take 10 mg by mouth daily          Reviewed and updated as needed this visit by clinical staff       Reviewed and updated as needed this visit by Provider         ROS:  No headache, chest pain, dyspnea, cough    OBJECTIVE:                                                    /88  Pulse 80  Temp 97.8  F (36.6  C) (Oral)  Resp 16  Ht 5' 4\" (1.626 m)  Wt 133 lb 8 oz (60.6 kg)  LMP 03/01/2004  SpO2 100%  BMI 22.92 kg/m2  Body mass index is 22.92 kg/(m^2).    CV: normal S1, S2 without murmur, S3 or S4.   No carotid bruits.        ASSESSMENT/PLAN:                                                            1. Benign essential hypertension  Recheck today was good. Advised that the noise may not be caused by high blood pressure, may be stronger heart contractions, sometimes this is related to increased blood return lying, sometimes stress related. Recommend urine protein to be sure no renal issues. Recommend recheck BP at pharmacy in 2 weeks and monitor there for a little while. If staying above 140/90 over 1-2 months, could increase med but want to avoid overtreatment. Call for acute concerns.   - Albumin Random Urine Quantitative        Brii Sherwood MD  [facility]    25 minutes spent with the patient, >50% of time spent counseling about  BP goals, causes of high BP and noises she may be hearing.   "

## 2017-07-10 ENCOUNTER — ALLIED HEALTH/NURSE VISIT (OUTPATIENT)
Dept: INTERNAL MEDICINE | Facility: CLINIC | Age: 62
End: 2017-07-10
Payer: COMMERCIAL

## 2017-07-10 VITALS — DIASTOLIC BLOOD PRESSURE: 84 MMHG | SYSTOLIC BLOOD PRESSURE: 148 MMHG

## 2017-07-10 DIAGNOSIS — I10 BENIGN ESSENTIAL HYPERTENSION: Primary | ICD-10-CM

## 2017-07-10 PROCEDURE — 99207 ZZC NO CHARGE NURSE ONLY: CPT | Performed by: INTERNAL MEDICINE

## 2017-07-10 NOTE — PROGRESS NOTES
Le Vora is enrolled/participating in the retail pharmacy Blood Pressure Goals Achievement Program (BPGAP).  Le Vora was evaluated at [facility] on July 10, 2017 at which time her blood pressure was:    BP Readings from Last 3 Encounters:   07/10/17 148/84   06/08/17 138/88   05/01/17 130/80     Reviewed lifestyle modifications for blood pressure control and reduction: including making healthy food choices, managing weight, getting regular exercise, smoking cessation, reducing alcohol consumption, monitoring blood pressure regularly.     Le Vora is not experiencing symptoms.    Follow-Up: BP is not at goal of < 140/90mmHg (patient 18+ years of age with or without diabetes), Recommended follow-up in 1 month at the pharmacy. Routing to PCP as an FYI.    Recommendation to Provider: Consider increasing Lisinopril to 20 mg daily, recheck BP at pharmacy 2 weeks after dose increase.     Le Vora was evaluated for enrollment into the PGEN study today.    Patient eligible for enrollment:  No - not uncontrolled x 3 months - could potentially be eligible if remains uncontrolled  Patient interested in enrollment:  Unknown - not eligible at this time    Completed by: Maria Teresa Junior, PharmD    [facility]  Phone:  [phone]  Fax:  [phone]

## 2017-07-10 NOTE — MR AVS SNAPSHOT
After Visit Summary   7/10/2017    Le Vora    MRN: [id number]           Patient Information     Date Of Birth          1955        Visit Information        Provider Department      7/10/2017 3:55 PM Brii Sherwood MD [facility]        Today's Diagnoses     Benign essential hypertension    -  1       Follow-ups after your visit        Who to contact     If you have questions or need follow up information about today's clinic visit or your schedule please contact [facility] directly at [phone].  Normal or non-critical lab and imaging results will be communicated to you by Scurrihart, letter or phone within 4 business days after the clinic has received the results. If you do not hear from us within 7 days, please contact the clinic through Scurrihart or phone. If you have a critical or abnormal lab result, we will notify you by phone as soon as possible.  Submit refill requests through Numascale or call your pharmacy and they will forward the refill request to us. Please allow 3 business days for your refill to be completed.          Additional Information About Your Visit        MyChart Information     Numascale gives you secure access to your electronic health record. If you see a primary care provider, you can also send messages to your care team and make appointments. If you have questions, please call your primary care clinic.  If you do not have a primary care provider, please call [phone] and they will assist you.        Care EveryWhere ID     This is your Care EveryWhere ID. This could be used by other organizations to access your [place] medical records  [id number]        Your Vitals Were     Last Period                   03/01/2004            Blood Pressure from Last 3 Encounters:   07/10/17 148/84   06/08/17 138/88   05/01/17 130/80    Weight from Last 3 Encounters:   06/08/17 133 lb 8 oz (60.6 kg)   05/01/17 137 lb 12.8 oz (62.5 kg)   04/29/17  132 lb (59.9 kg)              Today, you had the following     No orders found for display       Primary Care Provider Office Phone # Fax #    Brii Sherwood [phone]       [facility] [address] [facility] [address]  [facility] [address]        Equal Access to Services     CHELSEA BHAKTA : Hadii aad ku hadasho Soomaali, waaxda luqadaha, qaybta kaalmada adeegyada, waxay antoinein haybrenda yangmariellejeff burnett. So [facility] [phone].    ATENCIÓN: Si habla español, tiene a barreto disposición servicios gratuitos de asistencia lingüística. Maribell al [phone].    We comply with applicable federal civil rights laws and [place] laws. We do not discriminate on the basis of race, color, national origin, age, disability sex, sexual orientation or gender identity.            Thank you!     Thank you for choosing [facility]  for your care. Our goal is always to provide you with excellent care. Hearing back from our patients is one way we can continue to improve our services. Please take a few minutes to complete the written survey that you may receive in the mail after your visit with us. Thank you!             Your Updated Medication List - Protect others around you: Learn how to safely use, store and throw away your medicines at www.disposemymeds.org.          This list is accurate as of: 7/10/17  3:58 PM.  Always use your most recent med list.                   Brand Name Dispense Instructions for use Diagnosis    ADVIL PO      Take 200 mg by mouth        aspirin 325 MG tablet      Take by mouth daily        CALTRATE 600+D 600-400 MG-UNIT Chew   Generic drug:  calcium carbonate-vitamin D     180 tablet    Take 1 chew tab by mouth daily.        conjugated estrogens cream    PREMARIN    42.5 g    Place 1 g vaginally twice a week    Atrophic vaginitis       levothyroxine 100 MCG tablet    SYNTHROID/LEVOTHROID    90 tablet    Take 1 tablet (100 mcg) by mouth daily    Hypothyroidism due to acquired  atrophy of thyroid       lisinopril 10 MG tablet    PRINIVIL/ZESTRIL    30 tablet    Take 1 tablet (10 mg) by mouth daily    Essential hypertension       TYLENOL PO      Take 325 mg by mouth        VITAMIN D (CHOLECALCIFEROL) PO      Take 1,000 Units by mouth daily

## 2017-07-25 ENCOUNTER — MYC MEDICAL ADVICE (OUTPATIENT)
Dept: INTERNAL MEDICINE | Facility: CLINIC | Age: 62
End: 2017-07-25

## 2017-07-25 DIAGNOSIS — I10 BENIGN ESSENTIAL HYPERTENSION: Primary | ICD-10-CM

## 2017-07-26 ENCOUNTER — MYC REFILL (OUTPATIENT)
Dept: INTERNAL MEDICINE | Facility: CLINIC | Age: 62
End: 2017-07-26

## 2017-07-26 DIAGNOSIS — I10 BENIGN ESSENTIAL HYPERTENSION: ICD-10-CM

## 2017-07-26 RX ORDER — LISINOPRIL 20 MG/1
20 TABLET ORAL DAILY
Qty: 30 TABLET | Refills: 1 | Status: CANCELLED | OUTPATIENT
Start: 2017-07-26

## 2017-07-26 RX ORDER — LISINOPRIL 20 MG/1
20 TABLET ORAL DAILY
Qty: 30 TABLET | Refills: 1 | Status: SHIPPED | OUTPATIENT
Start: 2017-07-26 | End: 2017-09-25

## 2017-07-26 NOTE — TELEPHONE ENCOUNTER
See Cuculus message - Already done    Lisinopril       Last Written Prescription Date: 7/26/17  Last Fill Quantity: 30, # refills: 1  Last Office Visit with G, P or [facility] prescribing provider: 6/8/17       Potassium   Date Value Ref Range Status   04/29/2017 3.5 3.4 - 5.3 mmol/L Final     Creatinine   Date Value Ref Range Status   04/29/2017 0.65 0.52 - 1.04 mg/dL Final     BP Readings from Last 3 Encounters:   07/10/17 148/84   06/08/17 138/88   05/01/17 130/80

## 2017-07-26 NOTE — TELEPHONE ENCOUNTER
Message from Mailgunhart:  Original authorizing provider: Brii Sherwood MD    Le AGUAYOMatthew Vora would like a refill of the following medications:  lisinopril (PRINIVIL/ZESTRIL) 20 MG tablet [Brii Sherwood MD]    Preferred pharmacy: [place], [place] - [facility] [address]    Comment:

## 2017-07-26 NOTE — TELEPHONE ENCOUNTER
Will send in refill for 20 mg lisinopril and have Dr Sherwood decide when she wants follow up   Pharmacy had suggested the increase at her blood pressure check

## 2017-09-14 DIAGNOSIS — E03.4 HYPOTHYROIDISM DUE TO ACQUIRED ATROPHY OF THYROID: ICD-10-CM

## 2017-09-14 RX ORDER — LEVOTHYROXINE SODIUM 100 UG/1
TABLET ORAL
Qty: 30 TABLET | Refills: 0 | Status: SHIPPED | OUTPATIENT
Start: 2017-09-14 | End: 2017-10-03

## 2017-09-14 NOTE — TELEPHONE ENCOUNTER
Pt sent message through My Chart. Advised her that she is due for labs, OV.     Will fill x 1.     TSH   Date Value Ref Range Status   09/08/2016 0.61 0.40 - 4.00 mU/L Final   ]    Prescription approved per [facility] Refill Protocol.

## 2017-09-14 NOTE — TELEPHONE ENCOUNTER
LEVOTHYROXINE     Last Written Prescription Date: 09/08/16  Last Quantity: 90, # refills: 3  Last Office Visit with G, P or [facility] prescribing provider: 06/08/17        TSH   Date Value Ref Range Status   09/08/2016 0.61 0.40 - 4.00 mU/L Final

## 2017-09-23 ENCOUNTER — MYC MEDICAL ADVICE (OUTPATIENT)
Dept: INTERNAL MEDICINE | Facility: CLINIC | Age: 62
End: 2017-09-23

## 2017-09-23 DIAGNOSIS — I10 BENIGN ESSENTIAL HYPERTENSION: ICD-10-CM

## 2017-09-25 RX ORDER — LISINOPRIL 20 MG/1
20 TABLET ORAL DAILY
Qty: 30 TABLET | Refills: 1 | Status: SHIPPED | OUTPATIENT
Start: 2017-09-25 | End: 2017-10-03

## 2017-09-25 NOTE — TELEPHONE ENCOUNTER
Lisinopril       Last Written Prescription Date: 7/26/17  Last Fill Quantity: 30, # refills: 1  Last Office Visit with G, P or [facility] prescribing provider: 6/8/17  Next 5 appointments (look out 90 days)     Oct 02, 2017  4:00 PM CDT   MyChart Physical Adult with Brii Sherwood MD   [facility] ([facility]    [address]  [facility] [id number] [phone]                   Potassium   Date Value Ref Range Status   04/29/2017 3.5 3.4 - 5.3 mmol/L Final     Creatinine   Date Value Ref Range Status   04/29/2017 0.65 0.52 - 1.04 mg/dL Final     BP Readings from Last 3 Encounters:   07/10/17 148/84   06/08/17 138/88   05/01/17 130/80

## 2017-10-02 ENCOUNTER — OFFICE VISIT (OUTPATIENT)
Dept: INTERNAL MEDICINE | Facility: CLINIC | Age: 62
End: 2017-10-02
Payer: COMMERCIAL

## 2017-10-02 VITALS
WEIGHT: 134.7 LBS | RESPIRATION RATE: 16 BRPM | DIASTOLIC BLOOD PRESSURE: 82 MMHG | SYSTOLIC BLOOD PRESSURE: 128 MMHG | HEART RATE: 88 BPM | BODY MASS INDEX: 23 KG/M2 | HEIGHT: 64 IN | TEMPERATURE: 98.4 F | OXYGEN SATURATION: 97 %

## 2017-10-02 DIAGNOSIS — I10 BENIGN ESSENTIAL HYPERTENSION: ICD-10-CM

## 2017-10-02 DIAGNOSIS — M81.0 OSTEOPOROSIS WITHOUT CURRENT PATHOLOGICAL FRACTURE, UNSPECIFIED OSTEOPOROSIS TYPE: ICD-10-CM

## 2017-10-02 DIAGNOSIS — Z00.00 ENCOUNTER FOR ROUTINE ADULT HEALTH EXAMINATION WITHOUT ABNORMAL FINDINGS: Primary | ICD-10-CM

## 2017-10-02 DIAGNOSIS — E03.4 HYPOTHYROIDISM DUE TO ACQUIRED ATROPHY OF THYROID: ICD-10-CM

## 2017-10-02 DIAGNOSIS — N95.2 ATROPHIC VAGINITIS: ICD-10-CM

## 2017-10-02 PROCEDURE — 80048 BASIC METABOLIC PNL TOTAL CA: CPT | Performed by: INTERNAL MEDICINE

## 2017-10-02 PROCEDURE — 36415 COLL VENOUS BLD VENIPUNCTURE: CPT | Performed by: INTERNAL MEDICINE

## 2017-10-02 PROCEDURE — 99396 PREV VISIT EST AGE 40-64: CPT | Performed by: INTERNAL MEDICINE

## 2017-10-02 PROCEDURE — 84443 ASSAY THYROID STIM HORMONE: CPT | Performed by: INTERNAL MEDICINE

## 2017-10-02 NOTE — NURSING NOTE
"Chief Complaint   Patient presents with     Physical     not fasting- need refills        Initial /82  Pulse 88  Temp 98.4  F (36.9  C) (Oral)  Resp 16  Ht 5' 4\" (1.626 m)  Wt 134 lb 11.2 oz (61.1 kg)  LMP 03/01/2004  SpO2 97%  BMI 23.12 kg/m2 Estimated body mass index is 23.12 kg/(m^2) as calculated from the following:    Height as of this encounter: 5' 4\" (1.626 m).    Weight as of this encounter: 134 lb 11.2 oz (61.1 kg).  Medication Reconciliation: davion Rosa CMA    Discussed Health Maintenance:    Patient agreed to schedule Mammogram. Order have been place and information given to patient.     "

## 2017-10-02 NOTE — PATIENT INSTRUCTIONS
PREVENTIVE HEALTH RECOMMENDATIONS:     Vaccines: Get a flu shot each year. Get a tetanus shot every 10 years.     Exercise for at least 150 minutes a week (an average of 30 minutes a day, 5 days of the week). This will help you control your weight and prevent disease.    Limit alcohol to one drink per day.    No smoking.     Wear sunscreen to prevent skin cancer.     See your dentist twice a year for an exam and cleaning.    Try to get Calcium 1200 mg total per day. It is best to not take it all at once. Try to get Vitamin D at least 1000 units per day.    BMI or Body Mass Index is a way of indicating weight and health risk for cardiovascular diseases, high blood pressure, diabetes.   Definitions:    Underweight is less than 18.5 and will be associated with health risk.   Normal BMI is 18.5 to 25   Overweight is 25-29   Obesity is 30 or greater   Morbid Obesity is 40 or greater or 35 or greater with diabetes or high blood pressure  Obesity and Morbid Obesity are associated with higher health risks. Lowering calories, exercising more may lower your BMI and even small decreases can have positive impact on lowering health risks.   Your Body mass index is 23.12 kg/(m^2)..

## 2017-10-02 NOTE — PROGRESS NOTES
SUBJECTIVE:   CC: Le Vora is an 62 year old woman who presents for preventive health visit.     Healthy Habits:    Do you get at least three servings of calcium containing foods daily (dairy, green leafy vegetables, etc.)? no, taking calcium and/or vitamin D supplement: yes -     Amount of exercise or daily activities, outside of work: 3 day(s) per week    Problems taking medications regularly No    Medication side effects: No    Have you had an eye exam in the past two years? Yes 02/2017    Do you see a dentist twice per year? yes    Do you have sleep apnea, excessive snoring or daytime drowsiness?no      Problems:   1. HTN; well controlled  2. Hypothyroidism: clinically stable          Today's PHQ-2 Score: PHQ-2 ( 1999 Pfizer) 10/2/2017 6/8/2017   Q1: Little interest or pleasure in doing things 0 0   Q2: Feeling down, depressed or hopeless 0 0   PHQ-2 Score 0 0   Q1: Little interest or pleasure in doing things - -   Q2: Feeling down, depressed or hopeless - -   PHQ-2 Score - -         Abuse: Current or Past(Physical, Sexual or Emotional)- No  Do you feel safe in your environment - Yes  Social History   Substance Use Topics     Smoking status: Never Smoker     Smokeless tobacco: Never Used     Alcohol use No     The patient does not drink >3 drinks per day nor >7 drinks per week.    Reviewed orders with patient.  Reviewed health maintenance and updated orders accordingly - Yes      Patient over age 50, mutual decision to screen reflected in health maintenance.      Pertinent mammograms are reviewed under the imaging tab.  History of abnormal Pap smear: NO - age 30-65 PAP every 5 years with negative HPV co-testing recommended    [facility] and [facility]  [facility]         Reviewed and updated as needed this visit by Provider          Patient Active Problem List   Diagnosis     CARDIOVASCULAR SCREENING; LDL GOAL LESS THAN 160     Advanced directives,  "counseling/discussion     Osteoporosis     Hypothyroidism due to acquired atrophy of thyroid     Benign essential hypertension     Current Outpatient Prescriptions   Medication Sig Dispense Refill     RaNITidine HCl (ZANTAC PO) Take by mouth as needed for heartburn       lisinopril (PRINIVIL/ZESTRIL) 20 MG tablet Take 1 tablet (20 mg) by mouth daily 30 tablet 1     levothyroxine (SYNTHROID/LEVOTHROID) 100 MCG tablet TAKE ONE TABLET BY MOUTH EVERY DAY 30 tablet 0     VITAMIN D, CHOLECALCIFEROL, PO Take 1,000 Units by mouth daily       conjugated estrogens (PREMARIN) vaginal cream Place 1 g vaginally twice a week 42.5 g 12     aspirin 325 MG tablet Take by mouth as needed        Acetaminophen (TYLENOL PO) Take 325 mg by mouth       Ibuprofen (ADVIL PO) Take 200 mg by mouth       calcium carbonate-vitamin D (CALTRATE 600+D) 600-400 MG-UNIT CHEW Take 1 chew tab by mouth daily. 180 tablet 3     [DISCONTINUED] lisinopril (PRINIVIL/ZESTRIL) 10 MG tablet Take 1 tablet (10 mg) by mouth daily (Patient taking differently: Take 20 mg by mouth daily ) 30 tablet 2      Review Of Systems  Skin: negative  Eyes: negative  Ears/Nose/Throat: negative  Respiratory: No dyspnea on exertion and No cough  Cardiovascular: negative  Gastrointestinal: negative  Genitourinary: occasional nausea, stomach upset, better with zantac  Musculoskeletal: negative  Neurologic: negative  Psychiatric: negative  Hematologic/Lymphatic/Immunologic: negative  Endocrine: negative   Gynecologic ros reveals:no breast pain or new or enlarging lumps on self exam, no vaginal bleeding, no discharge or pelvic pain    Objective:  Patient alert, in no acute distress  /82  Pulse 88  Temp 98.4  F (36.9  C) (Oral)  Resp 16  Ht 5' 4\" (1.626 m)  Wt 134 lb 11.2 oz (61.1 kg)  LMP 03/01/2004  SpO2 97%  BMI 23.12 kg/m2    HEENT: extraocular movements are intact, pupils equal and reactive to light and accommodation, TMs clear, oropharynx clear  NECK: Neck supple. " "No adenopathy. Thyroid symmetric, normal size,, Carotids without bruits.  PULMONARY: clear to auscultation  CARDIAC: regular rate and rhythm and no murmurs, clicks, or gallops  PULSES: 2/2 throughout  BACK: no spinal or CVAT  ABDOMINAL: Soft, nontender.  Normal bowel sounds.  No hepatosplenomegaly or abnormal masses  BREAST: No breast masses or tenderness, No axillary masses or tenderness and No galactorrhea  PELVIC: external genitalia normal bimanual exam with normal uterus and adnexa  REFLEXES: 2+ throughout  SKIN: unremarkable       ASSESSMENT/PLAN:   1. Encounter for routine adult health examination without abnormal findings    - MA Screening Digital Bilateral; Future  - Basic metabolic panel    2. Hypothyroidism due to acquired atrophy of thyroid  Recheck lab  - TSH with free T4 reflex  - levothyroxine (SYNTHROID/LEVOTHROID) 100 MCG tablet; Take 1 tablet (100 mcg) by mouth daily  Dispense: 90 tablet; Refill: 3    3. Benign essential hypertension  Controlled, continue med  - Basic metabolic panel  - lisinopril (PRINIVIL/ZESTRIL) 20 MG tablet; Take 1 tablet (20 mg) by mouth daily  Dispense: 90 tablet; Refill: 3    4. Osteoporosis without current pathological fracture, unspecified osteoporosis type  due  - DX Hip/Pelvis/Spine; Future    5. Atrophic vaginitis    - conjugated estrogens (PREMARIN) cream; Place 1 g vaginally twice a week  Dispense: 42.5 g; Refill: 12    COUNSELING:   Reviewed preventive health counseling, as reflected in patient instructions       Osteoporosis Prevention/Bone Health         reports that she has never smoked. She has never used smokeless tobacco.    Estimated body mass index is 22.92 kg/(m^2) as calculated from the following:    Height as of 6/8/17: 5' 4\" (1.626 m).    Weight as of 6/8/17: 133 lb 8 oz (60.6 kg).         Counseling Resources:  ATP IV Guidelines  Pooled Cohorts Equation Calculator  Breast Cancer Risk Calculator  FRAX Risk Assessment  ICSI Preventive Guidelines  Dietary " Guidelines for Americans, 2010  USDA's MyPlate  ASA Prophylaxis  Lung CA Screening    Brii Sherwood MD  [facility]

## 2017-10-02 NOTE — MR AVS SNAPSHOT
After Visit Summary   10/2/2017    Le Vora    MRN: [id number]           Patient Information     Date Of Birth          1955        Visit Information        Provider Department      10/2/2017 4:00 PM [facility]        Today's Diagnoses     Encounter for routine adult health examination without abnormal findings    -  1    Hypothyroidism due to acquired atrophy of thyroid        Benign essential hypertension        Osteoporosis without current pathological fracture, unspecified osteoporosis type          Care Instructions      PREVENTIVE HEALTH RECOMMENDATIONS:     Vaccines: Get a flu shot each year. Get a tetanus shot every 10 years.     Exercise for at least 150 minutes a week (an average of 30 minutes a day, 5 days of the week). This will help you control your weight and prevent disease.    Limit alcohol to one drink per day.    No smoking.     Wear sunscreen to prevent skin cancer.     See your dentist twice a year for an exam and cleaning.    Try to get Calcium 1200 mg total per day. It is best to not take it all at once. Try to get Vitamin D at least 1000 units per day.    BMI or Body Mass Index is a way of indicating weight and health risk for cardiovascular diseases, high blood pressure, diabetes.   Definitions:    Underweight is less than 18.5 and will be associated with health risk.   Normal BMI is 18.5 to 25   Overweight is 25-29   Obesity is 30 or greater   Morbid Obesity is 40 or greater or 35 or greater with diabetes or high blood pressure  Obesity and Morbid Obesity are associated with higher health risks. Lowering calories, exercising more may lower your BMI and even small decreases can have positive impact on lowering health risks.   Your Body mass index is 23.12 kg/(m^2)..             Follow-ups after your visit        Future tests that were ordered for you today     Open Future Orders        Priority Expected Expires Ordered    DX  "Hip/Pelvis/Spine Routine  10/2/2018 10/2/2017    MA Screening Digital Bilateral Routine  10/2/2018 10/2/2017            Who to contact     If you have questions or need follow up information about today's clinic visit or your schedule please contact [facility] directly at [phone].  Normal or non-critical lab and imaging results will be communicated to you by MyChart, letter or phone within 4 business days after the clinic has received the results. If you do not hear from us within 7 days, please contact the clinic through C-samhart or phone. If you have a critical or abnormal lab result, we will notify you by phone as soon as possible.  Submit refill requests through [employer] or call your pharmacy and they will forward the refill request to us. Please allow 3 business days for your refill to be completed.          Additional Information About Your Visit        C-samhart Information     [employer] gives you secure access to your electronic health record. If you see a primary care provider, you can also send messages to your care team and make appointments. If you have questions, please call your primary care clinic.  If you do not have a primary care provider, please call [phone] and they will assist you.        Care EveryWhere ID     This is your Care EveryWhere ID. This could be used by other organizations to access your [place] medical records  [id number]        Your Vitals Were     Pulse Temperature Respirations Height Last Period Pulse Oximetry    88 98.4  F (36.9  C) (Oral) 16 5' 4\" (1.626 m) 03/01/2004 97%    BMI (Body Mass Index)                   23.12 kg/m2            Blood Pressure from Last 3 Encounters:   10/02/17 128/82   07/10/17 148/84   06/08/17 138/88    Weight from Last 3 Encounters:   10/02/17 134 lb 11.2 oz (61.1 kg)   06/08/17 133 lb 8 oz (60.6 kg)   05/01/17 137 lb 12.8 oz (62.5 kg)              We Performed the Following     Basic metabolic panel     TSH with free T4 " reflex          Today's Medication Changes          These changes are accurate as of: 10/2/17  4:44 PM.  If you have any questions, ask your nurse or doctor.               These medicines have changed or have updated prescriptions.        Dose/Directions    lisinopril 20 MG tablet   Commonly known as:  PRINIVIL/ZESTRIL   This may have changed:  Another medication with the same name was removed. Continue taking this medication, and follow the directions you see here.   Used for:  Benign essential hypertension   Changed by:  Brii Sherwood MD        Dose:  20 mg   Take 1 tablet (20 mg) by mouth daily   Quantity:  30 tablet   Refills:  1                Primary Care Provider Office Phone # Fax #    Brii Sherwood [phone]       [address] [address]        Equal Access to Services     [facility]: Hadii aad ku hadasho Soomaali, waaxda luqadaha, qaybta kaalmada adeegyada, meet clark . So [facility] [phone].    ATENCIÓN: Si habla español, tiene a barreto disposición servicios gratuitos de asistencia lingüística. Llame al [phone].    We comply with applicable federal civil rights laws and [place] laws. We do not discriminate on the basis of race, color, national origin, age, disability, sex, sexual orientation, or gender identity.            Thank you!     Thank you for choosing [facility]  for your care. Our goal is always to provide you with excellent care. Hearing back from our patients is one way we can continue to improve our services. Please take a few minutes to complete the written survey that you may receive in the mail after your visit with us. Thank you!             Your Updated Medication List - Protect others around you: Learn how to safely use, store and throw away your medicines at www.disposemymeds.org.          This list is accurate as of: 10/2/17  4:44 PM.  Always use your most recent med list.                    Brand Name Dispense Instructions for use Diagnosis    ADVIL PO      Take 200 mg by mouth        aspirin 325 MG tablet      Take by mouth as needed        CALTRATE 600+D 600-400 MG-UNIT Chew   Generic drug:  calcium carbonate-vitamin D     180 tablet    Take 1 chew tab by mouth daily.        conjugated estrogens cream    PREMARIN    42.5 g    Place 1 g vaginally twice a week    Atrophic vaginitis       levothyroxine 100 MCG tablet    SYNTHROID/LEVOTHROID    30 tablet    TAKE ONE TABLET BY MOUTH EVERY DAY    Hypothyroidism due to acquired atrophy of thyroid       lisinopril 20 MG tablet    PRINIVIL/ZESTRIL    30 tablet    Take 1 tablet (20 mg) by mouth daily    Benign essential hypertension       TYLENOL PO      Take 325 mg by mouth        VITAMIN D (CHOLECALCIFEROL) PO      Take 1,000 Units by mouth daily        ZANTAC PO      Take by mouth as needed for heartburn

## 2017-10-03 LAB
ANION GAP SERPL CALCULATED.3IONS-SCNC: 12 MMOL/L (ref 3–14)
BUN SERPL-MCNC: 19 MG/DL (ref 7–30)
CALCIUM SERPL-MCNC: 9.2 MG/DL (ref 8.5–10.1)
CHLORIDE SERPL-SCNC: 107 MMOL/L (ref 94–109)
CO2 SERPL-SCNC: 22 MMOL/L (ref 20–32)
CREAT SERPL-MCNC: 0.78 MG/DL (ref 0.52–1.04)
GFR SERPL CREATININE-BSD FRML MDRD: 75 ML/MIN/1.7M2
GLUCOSE SERPL-MCNC: 83 MG/DL (ref 70–99)
POTASSIUM SERPL-SCNC: 4.3 MMOL/L (ref 3.4–5.3)
SODIUM SERPL-SCNC: 141 MMOL/L (ref 133–144)
TSH SERPL DL<=0.005 MIU/L-ACNC: 0.65 MU/L (ref 0.4–4)

## 2017-10-03 RX ORDER — LISINOPRIL 20 MG/1
20 TABLET ORAL DAILY
Qty: 90 TABLET | Refills: 3 | Status: SHIPPED | OUTPATIENT
Start: 2017-10-03 | End: 2017-11-20

## 2017-10-03 RX ORDER — LEVOTHYROXINE SODIUM 100 UG/1
100 TABLET ORAL DAILY
Qty: 90 TABLET | Refills: 3 | Status: SHIPPED | OUTPATIENT
Start: 2017-10-03 | End: 2018-10-25

## 2017-10-17 ENCOUNTER — TELEPHONE (OUTPATIENT)
Dept: BONE DENSITY | Facility: CLINIC | Age: 62
End: 2017-10-17

## 2017-11-02 ENCOUNTER — TELEPHONE (OUTPATIENT)
Dept: BONE DENSITY | Facility: CLINIC | Age: 62
End: 2017-11-02

## 2017-11-19 ENCOUNTER — MYC MEDICAL ADVICE (OUTPATIENT)
Dept: INTERNAL MEDICINE | Facility: CLINIC | Age: 62
End: 2017-11-19

## 2017-11-19 DIAGNOSIS — I10 BENIGN ESSENTIAL HYPERTENSION: ICD-10-CM

## 2017-11-20 RX ORDER — LISINOPRIL 20 MG/1
40 TABLET ORAL DAILY
Qty: 180 TABLET | Refills: 0
Start: 2017-11-20 | End: 2018-01-25

## 2017-12-07 ENCOUNTER — MYC MEDICAL ADVICE (OUTPATIENT)
Dept: INTERNAL MEDICINE | Facility: CLINIC | Age: 62
End: 2017-12-07

## 2017-12-08 ENCOUNTER — MYC MEDICAL ADVICE (OUTPATIENT)
Dept: INTERNAL MEDICINE | Facility: CLINIC | Age: 62
End: 2017-12-08

## 2017-12-14 ENCOUNTER — RADIANT APPOINTMENT (OUTPATIENT)
Dept: BONE DENSITY | Facility: CLINIC | Age: 62
End: 2017-12-14
Attending: INTERNAL MEDICINE
Payer: COMMERCIAL

## 2017-12-14 DIAGNOSIS — M81.0 OSTEOPOROSIS WITHOUT CURRENT PATHOLOGICAL FRACTURE, UNSPECIFIED OSTEOPOROSIS TYPE: ICD-10-CM

## 2017-12-14 PROCEDURE — 77080 DXA BONE DENSITY AXIAL: CPT | Performed by: INTERNAL MEDICINE

## 2017-12-18 ENCOUNTER — MYC MEDICAL ADVICE (OUTPATIENT)
Dept: INTERNAL MEDICINE | Facility: CLINIC | Age: 62
End: 2017-12-18

## 2017-12-18 DIAGNOSIS — M81.0 OSTEOPOROSIS WITHOUT CURRENT PATHOLOGICAL FRACTURE, UNSPECIFIED OSTEOPOROSIS TYPE: Primary | ICD-10-CM

## 2017-12-19 RX ORDER — ALENDRONATE SODIUM 70 MG/1
TABLET ORAL
Qty: 4 TABLET | Refills: 5 | Status: SHIPPED | OUTPATIENT
Start: 2017-12-19 | End: 2018-03-29

## 2017-12-20 ENCOUNTER — MYC MEDICAL ADVICE (OUTPATIENT)
Dept: INTERNAL MEDICINE | Facility: CLINIC | Age: 62
End: 2017-12-20

## 2018-01-25 ENCOUNTER — MYC MEDICAL ADVICE (OUTPATIENT)
Dept: INTERNAL MEDICINE | Facility: CLINIC | Age: 63
End: 2018-01-25

## 2018-01-25 DIAGNOSIS — I10 BENIGN ESSENTIAL HYPERTENSION: ICD-10-CM

## 2018-01-25 RX ORDER — LISINOPRIL 20 MG/1
20 TABLET ORAL DAILY
Qty: 90 TABLET | Refills: 1 | Status: SHIPPED | OUTPATIENT
Start: 2018-01-25 | End: 2018-10-05

## 2018-03-26 ENCOUNTER — TELEPHONE (OUTPATIENT)
Dept: INTERNAL MEDICINE | Facility: CLINIC | Age: 63
End: 2018-03-26

## 2018-03-26 NOTE — LETTER
[facility]  [address], [address]  [place], [place]  [address]                                            TEL:[phone]  FAX:[phone]      Le Vora  [address] [facility] [address]          March 26, 2018    Dear Le,    In order to ensure we are providing the best quality care, we have reviewed your chart and see that you are due for:     1. Mammogram    Please call the Mammogram Breast center : [phone] at your earliest convenience to schedule an appointment.   Thank you for trusting us with your health care.        Sincerely,      Brii Sherwood M.D.

## 2018-03-26 NOTE — TELEPHONE ENCOUNTER
Panel Management Review      Patient has the following on her problem list:     Hypertension   Last three blood pressure readings:  BP Readings from Last 3 Encounters:   10/02/17 128/82   07/10/17 148/84   06/08/17 138/88     Blood pressure: Passed    HTN Guidelines:  Age 18-59 BP range:  Less than 140/90  Age 60-85 with Diabetes:  Less than 140/90  Age 60-85 without Diabetes:  less than 150/90      Composite cancer screening  Chart review shows that this patient is due/due soon for the following Mammogram  Summary:    Patient is due/failing the following:   MAMMOGRAM    Action needed:   Need to schedule- pt had schedule 12/14/2018 but then canceled and did not schedule since. Will send a reminder letter. Pt's last ov was 10/02/2017.    Type of outreach:    Sent letter.    Questions for provider review:    None                                                                                                                                     Oma Rosa CMA       Chart routed to CLOSED .

## 2018-03-29 ENCOUNTER — HOSPITAL ENCOUNTER (OUTPATIENT)
Dept: MAMMOGRAPHY | Facility: CLINIC | Age: 63
Discharge: HOME | End: 2018-03-29
Attending: INTERNAL MEDICINE | Admitting: INTERNAL MEDICINE
Payer: COMMERCIAL

## 2018-03-29 ENCOUNTER — OFFICE VISIT (OUTPATIENT)
Dept: INTERNAL MEDICINE | Facility: CLINIC | Age: 63
End: 2018-03-29
Payer: COMMERCIAL

## 2018-03-29 VITALS
WEIGHT: 134 LBS | OXYGEN SATURATION: 97 % | DIASTOLIC BLOOD PRESSURE: 80 MMHG | RESPIRATION RATE: 16 BRPM | HEART RATE: 77 BPM | BODY MASS INDEX: 23 KG/M2 | TEMPERATURE: 98.5 F | SYSTOLIC BLOOD PRESSURE: 134 MMHG

## 2018-03-29 DIAGNOSIS — I10 BENIGN ESSENTIAL HYPERTENSION: Primary | ICD-10-CM

## 2018-03-29 DIAGNOSIS — Z00.00 ENCOUNTER FOR ROUTINE ADULT HEALTH EXAMINATION WITHOUT ABNORMAL FINDINGS: ICD-10-CM

## 2018-03-29 DIAGNOSIS — M81.0 OSTEOPOROSIS WITHOUT CURRENT PATHOLOGICAL FRACTURE, UNSPECIFIED OSTEOPOROSIS TYPE: ICD-10-CM

## 2018-03-29 DIAGNOSIS — E03.4 HYPOTHYROIDISM DUE TO ACQUIRED ATROPHY OF THYROID: ICD-10-CM

## 2018-03-29 PROCEDURE — 77067 SCR MAMMO BI INCL CAD: CPT

## 2018-03-29 PROCEDURE — 99214 OFFICE O/P EST MOD 30 MIN: CPT | Performed by: INTERNAL MEDICINE

## 2018-03-29 RX ORDER — ALENDRONATE SODIUM 70 MG/1
TABLET ORAL
Qty: 12 TABLET | Refills: 3 | Status: SHIPPED | OUTPATIENT
Start: 2018-03-29 | End: 2018-10-25

## 2018-03-29 NOTE — PROGRESS NOTES
SUBJECTIVE:   Le Vora is a 62 year old female who presents to clinic today for the following health issues:      Hypertension Follow-up      Outpatient blood pressures are being checked at home.  Results are 120/70, 128/83    Low Salt Diet: little to no salt    Hypothyroidism Follow-up      Since last visit, patient describes the following symptoms: Weight stable, no hair loss, no skin changes, no constipation, no loose stools      Amount of exercise or physical activity: Walking and riding bike when spring is here.     Problems taking medications regularly: No    Medication side effects: none    Diet: low salt    Other problems:   1. Osteoporosis: on Fosamax since December, tolerating    Current Concerns:   none    Patient Active Problem List   Diagnosis     CARDIOVASCULAR SCREENING; LDL GOAL LESS THAN 160     Advanced directives, counseling/discussion     Osteoporosis     Hypothyroidism due to acquired atrophy of thyroid     Benign essential hypertension       Current Outpatient Prescriptions   Medication Sig Dispense Refill     lisinopril (PRINIVIL/ZESTRIL) 20 MG tablet Take 1 tablet (20 mg) by mouth daily 90 tablet 1     alendronate (FOSAMAX) 70 MG tablet Take 1 tablet (70 mg) by mouth with 8oz water every 7 days 30 minutes before breakfast and remain upright during this time. 4 tablet 5     levothyroxine (SYNTHROID/LEVOTHROID) 100 MCG tablet Take 1 tablet (100 mcg) by mouth daily 90 tablet 3     conjugated estrogens (PREMARIN) cream Place 1 g vaginally twice a week 42.5 g 12     RaNITidine HCl (ZANTAC PO) Take by mouth as needed for heartburn       VITAMIN D, CHOLECALCIFEROL, PO Take 1,000 Units by mouth daily       aspirin 325 MG tablet Take by mouth as needed        Acetaminophen (TYLENOL PO) Take 325 mg by mouth       Ibuprofen (ADVIL PO) Take 200 mg by mouth       calcium carbonate-vitamin D (CALTRATE 600+D) 600-400 MG-UNIT CHEW Take 1 chew tab by mouth daily. 180 tablet 3         Social  History   Substance Use Topics     Smoking status: Never Smoker     Smokeless tobacco: Never Used     Alcohol use No        Reviewed and updated as needed this visit by clinical staff       Reviewed and updated as needed this visit by Provider         ROS:  No fever, chills, no dyspnea on exertion, no chest pain, palpitations, PND, orthopnea, edema, syncope, headache, abdominal pain     OBJECTIVE:     /80  Pulse 77  Temp 98.5  F (36.9  C) (Oral)  Resp 16  Wt 134 lb (60.8 kg)  LMP 03/01/2004  SpO2 97%  BMI 23 kg/m2  Body mass index is 23 kg/(m^2).    Lungs: clear  CV: normal S1, S2 without murmur, S3 or S4.   No edema  Pulses full, no carotid bruits        ASSESSMENT/PLAN:             1. Benign essential hypertension  Controlled, continue med    2. Osteoporosis without current pathological fracture, unspecified osteoporosis type  Stable, continue med  - alendronate (FOSAMAX) 70 MG tablet; Take 1 tablet (70 mg) by mouth with 8oz water every 7 days 30 minutes before breakfast and remain upright during this time.  Dispense: 12 tablet; Refill: 3    3. Hypothyroidism due to acquired atrophy of thyroid  Stable, continue dose, recheck in the fa..     Brii Sherwood MD  [facility]

## 2018-03-29 NOTE — MR AVS SNAPSHOT
After Visit Summary   3/29/2018    Le Vora    MRN: [id number]           Patient Information     Date Of Birth          1955        Visit Information        Provider Department      3/29/2018 9:40 AM Brii Sherwood MD [facility]        Today's Diagnoses     Benign essential hypertension    -  1    Osteoporosis without current pathological fracture, unspecified osteoporosis type        Hypothyroidism due to acquired atrophy of thyroid           Follow-ups after your visit        Who to contact     If you have questions or need follow up information about today's clinic visit or your schedule please contact [facility] directly at [phone].  Normal or non-critical lab and imaging results will be communicated to you by MyChart, letter or phone within 4 business days after the clinic has received the results. If you do not hear from us within 7 days, please contact the clinic through PictureMenut or phone. If you have a critical or abnormal lab result, we will notify you by phone as soon as possible.  Submit refill requests through Shopzilla or call your pharmacy and they will forward the refill request to us. Please allow 3 business days for your refill to be completed.          Additional Information About Your Visit        MyChart Information     Shopzilla gives you secure access to your electronic health record. If you see a primary care provider, you can also send messages to your care team and make appointments. If you have questions, please call your primary care clinic.  If you do not have a primary care provider, please call [phone] and they will assist you.        Care EveryWhere ID     This is your Care EveryWhere ID. This could be used by other organizations to access your [facility] medical records  [id number]        Your Vitals Were     Pulse Temperature Respirations Last Period Pulse Oximetry BMI (Body Mass Index)    77 98.5  F (36.9  C) (Oral)  16 03/01/2004 97% 23 kg/m2       Blood Pressure from Last 3 Encounters:   03/29/18 134/80   10/02/17 128/82   07/10/17 148/84    Weight from Last 3 Encounters:   03/29/18 134 lb (60.8 kg)   10/02/17 134 lb 11.2 oz (61.1 kg)   06/08/17 133 lb 8 oz (60.6 kg)              Today, you had the following     No orders found for display         Where to get your medicines      These medications were sent to [facility] [place], [place] - [address]  [address] [facility] [address]     Phone:  [phone]     alendronate 70 MG tablet          Primary Care Provider Office Phone # Fax #    Brii Sherwood [phone]       [address]  [facility] [address]        Equal Access to Services     [facility] : Hadii aad ku hadasho Soomaali, waaxda luqadaha, qaybta kaalmada adeegyada, waxay antoinein hayumberton michael clark . So [facility] [phone].    ATENCIÓN: Si habla español, tiene a barreto disposición servicios gratuitos de asistencia lingüística. Maribell al [phone].    We comply with applicable federal civil rights laws and [place] laws. We do not discriminate on the basis of race, color, national origin, age, disability, sex, sexual orientation, or gender identity.            Thank you!     Thank you for choosing [facility]  for your care. Our goal is always to provide you with excellent care. Hearing back from our patients is one way we can continue to improve our services. Please take a few minutes to complete the written survey that you may receive in the mail after your visit with us. Thank you!             Your Updated Medication List - Protect others around you: Learn how to safely use, store and throw away your medicines at www.disposemymeds.org.          This list is accurate as of 3/29/18 11:59 PM.  Always use your most recent med list.                   Brand Name Dispense Instructions for use Diagnosis    ADVIL PO      Take 200 mg by  mouth        alendronate 70 MG tablet    FOSAMAX    12 tablet    Take 1 tablet (70 mg) by mouth with 8oz water every 7 days 30 minutes before breakfast and remain upright during this time.    Osteoporosis without current pathological fracture, unspecified osteoporosis type       aspirin 325 MG tablet      Take by mouth as needed        CALTRATE 600+D 600-400 MG-UNIT Chew   Generic drug:  calcium carbonate-vitamin D     180 tablet    Take 1 chew tab by mouth daily.        conjugated estrogens cream    PREMARIN    42.5 g    Place 1 g vaginally twice a week    Atrophic vaginitis       levothyroxine 100 MCG tablet    SYNTHROID/LEVOTHROID    90 tablet    Take 1 tablet (100 mcg) by mouth daily    Hypothyroidism due to acquired atrophy of thyroid       lisinopril 20 MG tablet    PRINIVIL/ZESTRIL    90 tablet    Take 1 tablet (20 mg) by mouth daily    Benign essential hypertension       TYLENOL PO      Take 325 mg by mouth        VITAMIN D (CHOLECALCIFEROL) PO      Take 1,000 Units by mouth daily        ZANTAC PO      Take by mouth as needed for heartburn

## 2018-03-29 NOTE — NURSING NOTE
"Chief Complaint   Patient presents with     Hypertension     LOV 10/02/2017: TSH, HTN,        Initial /84  Pulse 77  Temp 98.5  F (36.9  C) (Oral)  Resp 16  Wt 134 lb (60.8 kg)  LMP 03/01/2004  SpO2 97%  BMI 23 kg/m2 Estimated body mass index is 23 kg/(m^2) as calculated from the following:    Height as of 10/2/17: 5' 4\" (1.626 m).    Weight as of this encounter: 134 lb (60.8 kg).  Medication Reconciliation: davion Rosa CMA    Discussed Health Maintenance:  Patient completed Mammogram.     "

## 2018-10-03 ENCOUNTER — TELEPHONE (OUTPATIENT)
Dept: INTERNAL MEDICINE | Facility: CLINIC | Age: 63
End: 2018-10-03

## 2018-10-03 DIAGNOSIS — I10 BENIGN ESSENTIAL HYPERTENSION: ICD-10-CM

## 2018-10-03 NOTE — TELEPHONE ENCOUNTER
Reason for Call:  Other prescription    Detailed comments: pt requesting a refill for her lisinopril. She would like a one month supply instead of the 3 month in case there will be any changes at her up coming appt    Phone Number Patient can be reached at: Home number on file [phone] (home)    Best Time: anytime    Can we leave a detailed message on this number? YES    Call taken on 10/3/2018 at 11:54 AM by Misti Berry

## 2018-10-05 RX ORDER — LISINOPRIL 20 MG/1
20 TABLET ORAL DAILY
Qty: 30 TABLET | Refills: 0 | Status: SHIPPED | OUTPATIENT
Start: 2018-10-05 | End: 2018-10-25

## 2018-10-05 NOTE — TELEPHONE ENCOUNTER
Lisinopril refill request. Pt has upcoming appt.   Prescription approved per [facility] Refill Protocol.      Last OV 3/29/18.    BP Readings from Last 3 Encounters:   03/29/18 134/80   10/02/17 128/82   07/10/17 148/84     Potassium   Date Value Ref Range Status   10/02/2017 4.3 3.4 - 5.3 mmol/L Final     Creatinine   Date Value Ref Range Status   10/02/2017 0.78 0.52 - 1.04 mg/dL Final

## 2018-10-25 ENCOUNTER — MYC MEDICAL ADVICE (OUTPATIENT)
Dept: INTERNAL MEDICINE | Facility: CLINIC | Age: 63
End: 2018-10-25

## 2018-10-25 ENCOUNTER — OFFICE VISIT (OUTPATIENT)
Dept: INTERNAL MEDICINE | Facility: CLINIC | Age: 63
End: 2018-10-25
Payer: COMMERCIAL

## 2018-10-25 VITALS
RESPIRATION RATE: 16 BRPM | SYSTOLIC BLOOD PRESSURE: 130 MMHG | BODY MASS INDEX: 22.53 KG/M2 | TEMPERATURE: 98.1 F | HEIGHT: 64 IN | DIASTOLIC BLOOD PRESSURE: 80 MMHG | WEIGHT: 132 LBS | HEART RATE: 69 BPM

## 2018-10-25 DIAGNOSIS — I10 BENIGN ESSENTIAL HYPERTENSION: Primary | ICD-10-CM

## 2018-10-25 DIAGNOSIS — Z13.6 CARDIOVASCULAR SCREENING; LDL GOAL LESS THAN 130: ICD-10-CM

## 2018-10-25 DIAGNOSIS — E03.4 HYPOTHYROIDISM DUE TO ACQUIRED ATROPHY OF THYROID: ICD-10-CM

## 2018-10-25 DIAGNOSIS — Z12.11 SPECIAL SCREENING FOR MALIGNANT NEOPLASMS, COLON: ICD-10-CM

## 2018-10-25 DIAGNOSIS — M81.0 OSTEOPOROSIS WITHOUT CURRENT PATHOLOGICAL FRACTURE, UNSPECIFIED OSTEOPOROSIS TYPE: ICD-10-CM

## 2018-10-25 DIAGNOSIS — Z11.59 SCREENING FOR VIRAL DISEASE: ICD-10-CM

## 2018-10-25 PROCEDURE — 87389 HIV-1 AG W/HIV-1&-2 AB AG IA: CPT | Performed by: INTERNAL MEDICINE

## 2018-10-25 PROCEDURE — 84443 ASSAY THYROID STIM HORMONE: CPT | Performed by: INTERNAL MEDICINE

## 2018-10-25 PROCEDURE — 99214 OFFICE O/P EST MOD 30 MIN: CPT | Performed by: INTERNAL MEDICINE

## 2018-10-25 PROCEDURE — 80061 LIPID PANEL: CPT | Performed by: INTERNAL MEDICINE

## 2018-10-25 PROCEDURE — 82043 UR ALBUMIN QUANTITATIVE: CPT | Performed by: INTERNAL MEDICINE

## 2018-10-25 PROCEDURE — 36415 COLL VENOUS BLD VENIPUNCTURE: CPT | Performed by: INTERNAL MEDICINE

## 2018-10-25 PROCEDURE — 80048 BASIC METABOLIC PNL TOTAL CA: CPT | Performed by: INTERNAL MEDICINE

## 2018-10-25 RX ORDER — LISINOPRIL 20 MG/1
20 TABLET ORAL DAILY
Qty: 90 TABLET | Refills: 3 | Status: SHIPPED | OUTPATIENT
Start: 2018-10-25 | End: 2019-12-26

## 2018-10-25 RX ORDER — LEVOTHYROXINE SODIUM 100 UG/1
100 TABLET ORAL DAILY
Qty: 90 TABLET | Refills: 3 | Status: SHIPPED | OUTPATIENT
Start: 2018-10-25 | End: 2019-12-26

## 2018-10-25 RX ORDER — ALENDRONATE SODIUM 70 MG/1
70 TABLET ORAL
Qty: 12 TABLET | Refills: 3 | Status: SHIPPED | OUTPATIENT
Start: 2018-10-25 | End: 2020-10-08 | Stop reason: SINTOL

## 2018-10-25 NOTE — MR AVS SNAPSHOT
After Visit Summary   10/25/2018    Le Vora    MRN: [id number]           Patient Information     Date Of Birth          1955        Visit Information        Provider Department      10/25/2018 4:20 PM Brii Sherwood MD [facility]        Today's Diagnoses     Benign essential hypertension    -  1    Hypothyroidism due to acquired atrophy of thyroid        Osteoporosis without current pathological fracture, unspecified osteoporosis type        Screening for viral disease        CARDIOVASCULAR SCREENING; LDL GOAL LESS THAN 130        Special screening for malignant neoplasms, colon           Follow-ups after your visit        Additional Services     GASTROENTEROLOGY ADULT REF PROCEDURE ONLY Ariadne Rodriguez [phone]; [facility]       Last Lab Result: Creatinine (mg/dL)       Date                     Value                 10/02/2017               0.78             ----------  Body mass index is 22.66 kg/(m^2).     Needed:  No  Language:  English    Patient will be contacted to schedule procedure.     Please be aware that coverage of these services is subject to the terms and limitations of your health insurance plan.  Call member services at your health plan with any benefit or coverage questions.  Any procedures must be performed at a [place] facility OR coordinated by your clinic's referral office.    Please bring the following with you to your appointment:    (1) Any X-Rays, CTs or MRIs which have been performed.  Contact the facility where they were done to arrange for  prior to your scheduled appointment.    (2) List of current medications   (3) This referral request   (4) Any documents/labs given to you for this referral                  Who to contact     If you have questions or need follow up information about today's clinic visit or your schedule please contact [facility] directly at [phone].  Normal or non-critical lab  "and imaging results will be communicated to you by MyChart, letter or phone within 4 business days after the clinic has received the results. If you do not hear from us within 7 days, please contact the clinic through Green A or phone. If you have a critical or abnormal lab result, we will notify you by phone as soon as possible.  Submit refill requests through Green A or call your pharmacy and they will forward the refill request to us. Please allow 3 business days for your refill to be completed.          Additional Information About Your Visit        BricsnetharciValue Information     Green A gives you secure access to your electronic health record. If you see a primary care provider, you can also send messages to your care team and make appointments. If you have questions, please call your primary care clinic.  If you do not have a primary care provider, please call [phone] and they will assist you.        Care EveryWhere ID     This is your Care EveryWhere ID. This could be used by other organizations to access your [facility] medical records  [id number]        Your Vitals Were     Pulse Temperature Respirations Height Last Period BMI (Body Mass Index)    69 98.1  F (36.7  C) (Oral) 16 5' 4\" (1.626 m) 03/01/2004 22.66 kg/m2       Blood Pressure from Last 3 Encounters:   10/25/18 130/80   03/29/18 134/80   10/02/17 128/82    Weight from Last 3 Encounters:   10/25/18 132 lb (59.9 kg)   03/29/18 134 lb (60.8 kg)   10/02/17 134 lb 11.2 oz (61.1 kg)              We Performed the Following     Albumin Random Urine Quantitative with Creat Ratio     Basic metabolic panel     GASTROENTEROLOGY ADULT REF PROCEDURE ONLY Ariadne Rodriguez [phone]; [facility]     HIV Antigen Antibody Combo     Lipid panel reflex to direct LDL Fasting     TSH with free T4 reflex          Today's Medication Changes          These changes are accurate as of 10/25/18  6:29 PM.  If you have any questions, ask your nurse or doctor.             "   These medicines have changed or have updated prescriptions.        Dose/Directions    alendronate 70 MG tablet   Commonly known as:  FOSAMAX   This may have changed:    - how much to take  - how to take this  - when to take this  - additional instructions   Used for:  Osteoporosis without current pathological fracture, unspecified osteoporosis type   Changed by:  Brii Sherwood MD        Dose:  70 mg   Take 1 tablet (70 mg) by mouth every 7 days   Quantity:  12 tablet   Refills:  3            Where to get your medicines      These medications were sent to [facility] [address] [facility]  [address] [facility] [address]     Phone:  [phone]     alendronate 70 MG tablet    levothyroxine 100 MCG tablet    lisinopril 20 MG tablet                Primary Care Provider Office Phone # Fax #    Brii Sherwood [phone]       [address]   [facility] [address]        Equal Access to Services     [facility]: Hadii levon ku hadasho Soomaali, waaxda luqadaha, qaybta kaalmada adeegyada, waxay antoinein hayaan michael clark . So [facility] [phone].    ATENCIÓN: Si habla español, tiene a barreto disposición servicios gratuitos de asistencia lingüística. [facility] [phone].    We comply with applicable federal civil rights laws and [place] laws. We do not discriminate on the basis of race, color, national origin, age, disability, sex, sexual orientation, or gender identity.            Thank you!     Thank you for choosing [facility]  for your care. Our goal is always to provide you with excellent care. Hearing back from our patients is one way we can continue to improve our services. Please take a few minutes to complete the written survey that you may receive in the mail after your visit with us. Thank you!             Your Updated Medication List - Protect others around you: Learn how to safely use, store and throw away your  medicines at www.disposemymeds.org.          This list is accurate as of 10/25/18  6:29 PM.  Always use your most recent med list.                   Brand Name Dispense Instructions for use Diagnosis    ADVIL PO      Take 200 mg by mouth        alendronate 70 MG tablet    FOSAMAX    12 tablet    Take 1 tablet (70 mg) by mouth every 7 days    Osteoporosis without current pathological fracture, unspecified osteoporosis type       aspirin 325 MG tablet      Take by mouth as needed        CALTRATE 600+D 600-400 MG-UNIT Chew   Generic drug:  calcium carbonate-vitamin D     180 tablet    Take 1 chew tab by mouth daily.        conjugated estrogens cream    PREMARIN    42.5 g    Place 1 g vaginally twice a week    Atrophic vaginitis       levothyroxine 100 MCG tablet    SYNTHROID/LEVOTHROID    90 tablet    Take 1 tablet (100 mcg) by mouth daily    Hypothyroidism due to acquired atrophy of thyroid       lisinopril 20 MG tablet    PRINIVIL/ZESTRIL    90 tablet    Take 1 tablet (20 mg) by mouth daily    Benign essential hypertension       TYLENOL PO      Take 325 mg by mouth        VITAMIN D (CHOLECALCIFEROL) PO      Take 1,000 Units by mouth daily        ZANTAC PO      Take by mouth as needed for heartburn

## 2018-10-25 NOTE — PROGRESS NOTES
SUBJECTIVE:                                                    Le Vora is a 63 year old female who presents to clinic today for the following health issues:        Hypertension Follow-up      Outpatient blood pressures are being checked at home.  Results are 115//73, 102/69, 110/73, 116/73. 96 the nurse checked her on her home machine here today and it read the same is our reading.    Low Salt Diet: low salt    Hypothyroidism Follow-up      Since last visit, patient describes the following symptoms, no hair loss, no skin changes, no constipation, no loose stools and weight loss of 2 lbs      Amount of exercise or physical activity: 1 day/week for an average of 15-30 minutes    Problems taking medications regularly: No    Medication side effects: none    Diet: regular (no restrictions)      Other problems:   1. Osteoporosis: She has been on her medications for less than a year, tolerating it without any problems.      Current Concerns:   none    Patient Active Problem List   Diagnosis     CARDIOVASCULAR SCREENING; LDL GOAL LESS THAN 160     Advanced directives, counseling/discussion     Osteoporosis     Hypothyroidism due to acquired atrophy of thyroid     Benign essential hypertension       Current Outpatient Prescriptions   Medication Sig Dispense Refill     Acetaminophen (TYLENOL PO) Take 325 mg by mouth       alendronate (FOSAMAX) 70 MG tablet Take 1 tablet (70 mg) by mouth with 8oz water every 7 days 30 minutes before breakfast and remain upright during this time. 12 tablet 3     aspirin 325 MG tablet Take by mouth as needed        conjugated estrogens (PREMARIN) cream Place 1 g vaginally twice a week 42.5 g 12     levothyroxine (SYNTHROID/LEVOTHROID) 100 MCG tablet Take 1 tablet (100 mcg) by mouth daily 90 tablet 3     lisinopril (PRINIVIL/ZESTRIL) 20 MG tablet Take 1 tablet (20 mg) by mouth daily 30 tablet 0     RaNITidine HCl (ZANTAC PO) Take by mouth as needed for heartburn       VITAMIN D,  "CHOLECALCIFEROL, PO Take 1,000 Units by mouth daily       calcium carbonate-vitamin D (CALTRATE 600+D) 600-400 MG-UNIT CHEW Take 1 chew tab by mouth daily. 180 tablet 3     Ibuprofen (ADVIL PO) Take 200 mg by mouth           Social History   Substance Use Topics     Smoking status: Never Smoker     Smokeless tobacco: Never Used     Alcohol use No          ROS:  No fever, chills, no dyspnea on exertion, no chest pain, palpitations, PND, orthopnea, edema, syncope, headache, abdominal pain     OBJECTIVE:     /80  Pulse 69  Temp 98.1  F (36.7  C) (Oral)  Resp 16  Ht 5' 4\" (1.626 m)  Wt 132 lb (59.9 kg)  LMP 03/01/2004  BMI 22.66 kg/m2  Body mass index is 22.66 kg/(m^2).    Lungs: clear  CV: normal S1, S2 without murmur, S3 or S4.   Abdomen: Bowel sounds normal, soft, nontender. No hepatosplenomegaly. No masses.   No edema  Pulses full, no carotid bruits        ASSESSMENT/PLAN:             1. Benign essential hypertension  Well-controlled, continue medication, if she were to start having lower readings with symptoms, we could lower the dose  - Basic metabolic panel  - Albumin Random Urine Quantitative with Creat Ratio  - lisinopril (PRINIVIL/ZESTRIL) 20 MG tablet; Take 1 tablet (20 mg) by mouth daily  Dispense: 90 tablet; Refill: 3    2. Hypothyroidism due to acquired atrophy of thyroid  Stable, check labs today  - TSH with free T4 reflex  - levothyroxine (SYNTHROID/LEVOTHROID) 100 MCG tablet; Take 1 tablet (100 mcg) by mouth daily  Dispense: 90 tablet; Refill: 3    3. Osteoporosis without current pathological fracture, unspecified osteoporosis type  Stable, continue medication, recheck in a year  - alendronate (FOSAMAX) 70 MG tablet; Take 1 tablet (70 mg) by mouth every 7 days  Dispense: 12 tablet; Refill: 3    4. Screening for viral disease    - HIV Antigen Antibody Combo    5. CARDIOVASCULAR SCREENING; LDL GOAL LESS THAN 130    - Lipid panel reflex to direct LDL Fasting        Brii Sherwood MD  [place] " [facility]

## 2018-10-26 LAB
ANION GAP SERPL CALCULATED.3IONS-SCNC: 7 MMOL/L (ref 3–14)
BUN SERPL-MCNC: 15 MG/DL (ref 7–30)
CALCIUM SERPL-MCNC: 9.4 MG/DL (ref 8.5–10.1)
CHLORIDE SERPL-SCNC: 106 MMOL/L (ref 94–109)
CHOLEST SERPL-MCNC: 215 MG/DL
CO2 SERPL-SCNC: 28 MMOL/L (ref 20–32)
CREAT SERPL-MCNC: 0.77 MG/DL (ref 0.52–1.04)
GFR SERPL CREATININE-BSD FRML MDRD: 76 ML/MIN/1.7M2
GLUCOSE SERPL-MCNC: 79 MG/DL (ref 70–99)
HDLC SERPL-MCNC: 68 MG/DL
LDLC SERPL CALC-MCNC: 135 MG/DL
NONHDLC SERPL-MCNC: 147 MG/DL
POTASSIUM SERPL-SCNC: 4.1 MMOL/L (ref 3.4–5.3)
SODIUM SERPL-SCNC: 141 MMOL/L (ref 133–144)
TRIGL SERPL-MCNC: 61 MG/DL
TSH SERPL DL<=0.005 MIU/L-ACNC: 1.57 MU/L (ref 0.4–4)

## 2018-10-27 LAB
CREAT UR-MCNC: 92 MG/DL
HIV 1+2 AB+HIV1 P24 AG SERPL QL IA: NONREACTIVE
MICROALBUMIN UR-MCNC: 6 MG/L
MICROALBUMIN/CREAT UR: 6.49 MG/G CR (ref 0–25)

## 2018-11-02 ENCOUNTER — MYC MEDICAL ADVICE (OUTPATIENT)
Dept: INTERNAL MEDICINE | Facility: CLINIC | Age: 63
End: 2018-11-02

## 2018-11-05 NOTE — TELEPHONE ENCOUNTER
See my chart message.     Her LDL went up 18 points in 3 years. HDL went up 7 points.     Total cholesterol increased 24 points.     Please advise

## 2018-11-08 ENCOUNTER — MYC MEDICAL ADVICE (OUTPATIENT)
Dept: INTERNAL MEDICINE | Facility: CLINIC | Age: 63
End: 2018-11-08

## 2019-02-03 ENCOUNTER — APPOINTMENT (OUTPATIENT)
Dept: GENERAL RADIOLOGY | Facility: CLINIC | Age: 64
End: 2019-02-03
Payer: COMMERCIAL

## 2019-02-03 ENCOUNTER — HOSPITAL ENCOUNTER (EMERGENCY)
Facility: CLINIC | Age: 64
Discharge: HOME | End: 2019-02-03
Attending: EMERGENCY MEDICINE | Admitting: EMERGENCY MEDICINE
Payer: COMMERCIAL

## 2019-02-03 VITALS
TEMPERATURE: 97.9 F | HEART RATE: 69 BPM | SYSTOLIC BLOOD PRESSURE: 161 MMHG | RESPIRATION RATE: 20 BRPM | DIASTOLIC BLOOD PRESSURE: 81 MMHG | OXYGEN SATURATION: 98 %

## 2019-02-03 DIAGNOSIS — R09.A2 SENSATION OF FOREIGN BODY IN ESOPHAGUS: ICD-10-CM

## 2019-02-03 PROCEDURE — 42999 UNLISTED PX PHRNX ADND/TNSL: CPT

## 2019-02-03 PROCEDURE — 25000132 ZZH RX MED GY IP 250 OP 250 PS 637: Performed by: EMERGENCY MEDICINE

## 2019-02-03 PROCEDURE — 25000125 ZZHC RX 250: Performed by: EMERGENCY MEDICINE

## 2019-02-03 PROCEDURE — 99283 EMERGENCY DEPT VISIT LOW MDM: CPT | Mod: 25

## 2019-02-03 PROCEDURE — 71046 X-RAY EXAM CHEST 2 VIEWS: CPT

## 2019-02-03 RX ADMIN — LIDOCAINE HYDROCHLORIDE 30 ML: 20 SOLUTION ORAL; TOPICAL at 15:44

## 2019-02-03 ASSESSMENT — ENCOUNTER SYMPTOMS
TROUBLE SWALLOWING: 0
ROS GI COMMENTS: POSITIVE FOR FOREIGN BODY SENSATION IN THROAT
STRIDOR: 0
APNEA: 0
WHEEZING: 0
CHOKING: 0
SHORTNESS OF BREATH: 0

## 2019-02-03 NOTE — ED AVS SNAPSHOT
[facility]  [address]  [facility] [address]  Phone:  [phone]  Fax:  [phone]                                    Le Vora   MRN: [id number]    Department:  [facility]   Date of Visit:  2/3/2019           After Visit Summary Signature Page    I have received my discharge instructions, and my questions have been answered. I have discussed any challenges I see with this plan with the nurse or doctor.    ..........................................................................................................................................  Patient/Patient Representative Signature      ..........................................................................................................................................  Patient Representative Print Name and Relationship to Patient    ..................................................               ................................................  Date                                   Time    ..........................................................................................................................................  Reviewed by Signature/Title    ...................................................              ..............................................  Date                                               Time          22EPIC Rev 08/18

## 2019-02-03 NOTE — ED TRIAGE NOTES
"Reports she attempted to swallow Fosamax pill this morning at 0715; states pill has been feeling \"stuck\" since then.  States she was drinking water afterward and able to swallow.  Took other pills later in the morning, states she has been able to eat pear and muffin, cups of coffee. States voice is now hoarse and raspy, states stuck feeling continues.  Denies trouble breathing, blocked airway. ABCs intact.  States she feels \"hard lump\" when attempting to swallow.  "

## 2019-02-03 NOTE — DISCHARGE INSTRUCTIONS
There is no evidence of a retained foreign body (pill) on examination today. You may have a small ulceration causing that sensation. However, if symptoms persist you may need endoscopy to look further. Return with worsening symptoms, pain, vomiting bloody or black material, fever, shortness of breath.   (4) walks frequently

## 2019-02-03 NOTE — ED PROVIDER NOTES
"  History     Chief Complaint:  Foreign body sensation in throat    HPI   Le Vora is a 63 year old female who presents with foreign body sensation in throat. The patient took her Fosamax as typical this morning at 0715 this morning and felt that it didn't go down. She drank water half an hour later, but still felt a sensation that the pill was \"stuck\" in her throat. She notes that this has happened in the past but passed soon after. She took her other medications and ate food later in the day with no difficulties and denies any dysphagia, but still feels a foreign body sensation in her upper esophagus. She denies any pain, fever, or shortness of breath.    Allergies:  Carbocaine  Tetracycline     Medications:    Acetaminophen  Fosamax  Caltrate  Levothyroxine  Lisinopril  Zantac  Cholecalciferol     Past Medical History:    Hypertension  Hypothyroidism  Osteoporosis   Mitral valve disorders  Basal cell carcinoma nos  Slow transit constipation  Tension headache    Past Surgical History:    Laparoscopic surgery for infertility    Family History:    Diabetes  CAD  Myeloma  Hypertension    Social History:  Smoking Status: Never Smoker  Alcohol Use: No  Patient presents with .   Marital Status:        Review of Systems   HENT: Negative for trouble swallowing.    Respiratory: Negative for apnea, choking, shortness of breath, wheezing and stridor.    Gastrointestinal:        Positive for foreign body sensation in throat   All other systems reviewed and are negative.      Physical Exam     Patient Vitals for the past 24 hrs:   BP Temp Temp src Pulse Heart Rate Resp SpO2   02/03/19 1408 161/81 97.9  F (36.6  C) Oral 69 69 20 98 %      Physical Exam  General: Adult female sitting upright  Eyes: PERRL, Conjunctive within normal limits  ENT: Moist mucous membranes, oropharynx clear aside from tiny white patch on left tonsil. No erythema or edema.   Neck: trachea midline. No crepitus.  CV: Normal S1S2, " "no murmur, rub or gallop. Regular rate and rhythm  Resp: Clear to auscultation bilaterally, no wheezes, rales or rhonchi. Normal respiratory effort.  GI: Abdomen is soft, nontender and nondistended. No palpable masses. No rebound or guarding.  MSK: No edema. Nontender. Normal active range of motion.  Skin: Warm and dry. No rashes or lesions or ecchymoses on visible skin.  Neuro: Alert and oriented. Responds appropriately to all questions and commands. No focal findings appreciated. Normal muscle tone.  Psych: Normal mood and affect. Pleasant.    Emergency Department Course     Imaging:  Radiographic findings were communicated with the patient who voiced understanding of the findings.    X-ray Chest, 2 views:  No definite radiopaque foreign object is seen over the  chest. Unclear whether the swallowed pill would be radiopaque. No  focal airspace opacities. No significant pleural effusion or  pneumothorax. Cardiac silhouette is within normal limits.   Result per radiology.     Interventions:  1544 - GI cocktail 30 ml PO    Emergency Department Course:  Past medical records, nursing notes, and vitals reviewed.  1421: I performed an exam of the patient and obtained history, as documented above.    The patient was sent for a chest x-ray while in the emergency department, findings above.     1530: I rechecked the patient. Explained findings to patient. She denies any new concerns. Reports \"maybe a little\" improvement after GI cocktail.    1546: I discussed the case with Dr. Hardwick of [facility] regarding the patient.      1608: I rechecked the patient. Findings and plan explained to the Patient. Patient discharged home with instructions regarding supportive care, medications, and reasons to return. The importance of close follow-up was reviewed.      Impression & Plan      Medical Decision Making:  Le Vora is a 63 year old female who presents to the emergency department with concerns for a foreign body sensation with " swallowing since swallowing a Fosamax pill early today. Despite this, she has been eating and drinking without difficulty. She has no pooling of secretions or difficulty swallowing secretions on arrival here. She denies any pain. I did not visualize a pill in the oropharynx, even with direct laryngoscopy which she tolerated without difficulty, although I was only able to partially visualized the epiglottis and unable to visualize the cords. There was no evidence of radiopaque foreign body on x-ray. I spoke with Dr. Hardwick with GI who thought likely she has a small ulceration from the Fosamax which likely has passed now and he felt she was appropriate for discharge from his standpoint. Patient was recommended to follow up with Dr. Hardwick in 1-2 days with ongoing symptoms. With worsening symptoms, she should return to the ED. All questions answered prior to discharge.     Diagnosis:    ICD-10-CM    1. Sensation of foreign body in esophagus K22.8      Disposition:  Discharged to home.     Antony Saldaña  2/3/2019   [facility] EMERGENCY DEPARTMENT    Scribe Disclosure:  I, Antony Saldaña, am serving as a scribe at 2:21 PM on 2/3/2019 to document services personally performed by Lea Traylor MD based on my observations and the provider's statements to me.        Lea Traylor MD  02/03/19 [id number]

## 2019-02-11 ENCOUNTER — TRANSFERRED RECORDS (OUTPATIENT)
Dept: HEALTH INFORMATION MANAGEMENT | Facility: CLINIC | Age: 64
End: 2019-02-11

## 2019-03-08 ENCOUNTER — TRANSFERRED RECORDS (OUTPATIENT)
Dept: HEALTH INFORMATION MANAGEMENT | Facility: CLINIC | Age: 64
End: 2019-03-08

## 2019-04-01 ENCOUNTER — ALLIED HEALTH/NURSE VISIT (OUTPATIENT)
Dept: NURSING | Facility: CLINIC | Age: 64
End: 2019-04-01
Payer: COMMERCIAL

## 2019-04-01 VITALS — HEART RATE: 70 BPM

## 2019-04-01 DIAGNOSIS — Z53.9 DIAGNOSIS FOR ++++ WALK IN CLINIC ++++: Primary | ICD-10-CM

## 2019-04-01 NOTE — NURSING NOTE
"Patient walked into clinic today complaining of palpitations. Patient states when she sleeps at night she can hear her heartbeat in her ear. Patient states last night when she was laying down, she noticed her heart beart sounded irregular. Patient states she then took her pulse, which was within \"normal range\" for her, but she did notice an irregular heart rhythm, and \"wondered if it was PVCs\". Patient states it lasted for a half hour or so, and resolved. Patient states she was not symptomatic, denied shortness of breath, and lightheadedness. Patient states had she not heard her heart beat, she doubts she would have noticed the irregular rhythm. Patient states she has a history of hypertension, but denied any other cardiac history. Patient states yesterday her blood pressure was in the 120's/80's. Patient states she does feel a little more stressed at work lately and wonders if this could be related. In the clinic, patient's pulse was 70, and had a regular rhythm.  Patient states she would like to make an appointment with primary care provider to discuss this. Patient states she will not go to the emergency room. Patient states she does not feel that this is emergent, and states she will go to the emergency room if this occurs again and she becomes symptomatic, or if she feels it is warranted. Writer assisted in scheduling patient for tomorrow.    Next 5 appointments (look out 90 days)    Apr 01, 2019  1:15 PM CDT  Nurse Only with Luis Summers  [facility] ([facility] [address]  [facility] [address]  [phone]   Apr 02, 2019  2:40 PM CDT  Office Visit with Brii Sherwood MD  [facility] ([facility]) [address]  [facility] [address]  [phone]          "

## 2019-04-01 NOTE — NURSING NOTE
"Patient walked into clinic today complaining of palpitations. Patient states when she sleeps at night she can hear her heartbeat in her ear. Patient states last night when she was laying down, she noticed her heart beart sounded irregular. Patient states she then took her pulse, which was within \"normal range\" for her, but she did notice an irregular heart rhythm, and \"wondered if it was PVCs\". Patient states it lasted for a half hour or so, and resolved. Patient states she was not symptomatic, denied shortness of breath, and lightheadedness. Patient states had she not heard her heart beat, she doubts she would have noticed the irregular rhythm. Patient states she has a history of hypertension, but denied any other cardiac history. Patient states yesterday her blood pressure was in the 120's/80's. Patient states she does feel a little more stressed at work lately and wonders if this could be related. In the clinic, patient's pulse was 70, and had a regular rhythm.  Patient states she would like to make an appointment with primary care provider to discuss this. Patient states she will not go to the emergency room. Patient states she does not feel that this is emergent, and states she will go to the emergency room if this occurs again and she becomes symptomatic, or if she feels it is warranted. Writer assisted in scheduling patient for tomorrow.  Next 5 appointments (look out 90 days)    Apr 01, 2019  1:15 PM CDT  Nurse Only with Luis Summers  [facility] ([facility]) [address]  [facility] [address]  [phone]   Apr 02, 2019  2:40 PM CDT  Office Visit with Brii Sherwood MD  [facility] ([facility]) [address]  [facility] [address]  [phone]          "

## 2019-04-02 ENCOUNTER — OFFICE VISIT (OUTPATIENT)
Dept: INTERNAL MEDICINE | Facility: CLINIC | Age: 64
End: 2019-04-02
Payer: COMMERCIAL

## 2019-04-02 ENCOUNTER — MYC MEDICAL ADVICE (OUTPATIENT)
Dept: INTERNAL MEDICINE | Facility: CLINIC | Age: 64
End: 2019-04-02

## 2019-04-02 VITALS
DIASTOLIC BLOOD PRESSURE: 82 MMHG | WEIGHT: 130.5 LBS | OXYGEN SATURATION: 99 % | HEIGHT: 64 IN | TEMPERATURE: 97.9 F | BODY MASS INDEX: 22.28 KG/M2 | SYSTOLIC BLOOD PRESSURE: 122 MMHG | RESPIRATION RATE: 16 BRPM | HEART RATE: 72 BPM

## 2019-04-02 DIAGNOSIS — R00.2 PALPITATIONS: Primary | ICD-10-CM

## 2019-04-02 PROCEDURE — 93000 ELECTROCARDIOGRAM COMPLETE: CPT | Performed by: INTERNAL MEDICINE

## 2019-04-02 PROCEDURE — 99213 OFFICE O/P EST LOW 20 MIN: CPT | Performed by: INTERNAL MEDICINE

## 2019-04-02 ASSESSMENT — MIFFLIN-ST. JEOR: SCORE: 1136.94

## 2019-04-02 NOTE — PROGRESS NOTES
SUBJECTIVE:   Le Vora is a 63 year old female who presents to clinic today for the following health issues:      Palpitations: She reports an episode 2 nights ago when she went to lie down she was aware that her heart beat was irregular.  She is tended to feel her heartbeat in her carotid artery when she lies down but this is the first time it was irregular, it seemed like there were some pauses or skips but it felt like it was not any kind of specific pattern.  This continued even feeling it at her wrist.  She laid there for 20 minutes and it persisted, she got up and went downstairs distracted herself and went back up and lay down.  It seemed to be normal when she went back to bed.  Yesterday morning it seemed a little fast but not irregular.  She has not had any other episodes since then.  She had no associated shortness of breath, chest pain or lightheadedness.  She does feel like she was quite tired that day.  She has been under a little bit of stress.  She is going to be traveling to [place] in 3 weeks and is concerned about having any problems while she is gone.    She also reports that she had a Fosamax pill stick in her throat in February.  She had burning  that persisted and went to the ED. they did not feel the pill was stuck in her throat, just caused some irritation.  She has not taken it since then.  She tends to put the pill in her mouth before she has water.  She can have a little bit of trouble with some of her other medications.    Patient Active Problem List   Diagnosis     Advanced directives, counseling/discussion     Osteoporosis     Hypothyroidism due to acquired atrophy of thyroid     Benign essential hypertension     CARDIOVASCULAR SCREENING; LDL GOAL LESS THAN 130     Current Outpatient Medications   Medication Sig Dispense Refill     Acetaminophen (TYLENOL PO) Take 325 mg by mouth       calcium carbonate-vitamin D (CALTRATE 600+D) 600-400 MG-UNIT CHEW Take 1 chew tab by mouth  "daily. 180 tablet 3     Ibuprofen (ADVIL PO) Take 200 mg by mouth       levothyroxine (SYNTHROID/LEVOTHROID) 100 MCG tablet Take 1 tablet (100 mcg) by mouth daily 90 tablet 3     lisinopril (PRINIVIL/ZESTRIL) 20 MG tablet Take 1 tablet (20 mg) by mouth daily 90 tablet 3     RaNITidine HCl (ZANTAC PO) Take by mouth as needed for heartburn       VITAMIN D, CHOLECALCIFEROL, PO Take 1,000 Units by mouth daily       alendronate (FOSAMAX) 70 MG tablet Take 1 tablet (70 mg) by mouth every 7 days (Patient not taking: Reported on 4/2/2019) 12 tablet 3      Social History     Tobacco Use     Smoking status: Never Smoker     Smokeless tobacco: Never Used   Substance Use Topics     Alcohol use: No     Drug use: No            Reviewed and updated as needed this visit by clinical staff  Tobacco  Allergies  Meds  Med Hx  Surg Hx  Fam Hx  Soc Hx      Reviewed and updated as needed this visit by Provider         ROS:  No fever, chills, chest pain, cough, shortness of breath, syncope or near syncope/     OBJECTIVE:     /82   Pulse 72   Temp 97.9  F (36.6  C) (Oral)   Resp 16   Ht 1.626 m (5' 4\")   Wt 59.2 kg (130 lb 8 oz)   LMP 03/01/2004   SpO2 99%   BMI 22.40 kg/m    Body mass index is 22.4 kg/m .    CV: normal S1, S2 without murmur, S3 or S4, no irregular or skipped beats      ASSESSMENT/PLAN:         1. Palpitations  Advised that most likely this represents premature beats, could have been because of fatigue.  Advised that many times they will go away if somebody gets up and moves around and speeds up the heart rate a little bit.  She had normal thyroid and potassium levels not very long ago so do not feel the need to repeat.  Advised we might be able to get a Holter or event recorder if it keeps coming back but since we are not sure if it will or not, I did not put an order in for one now.  Try to get extra rest, call for any persistent symptoms.          Brii Sherwood MD  [facility]    "

## 2019-04-02 NOTE — NURSING NOTE
"Vital signs:  Temp: 97.9  F (36.6  C) Temp src: Oral BP: 122/82 Pulse: 72   Resp: 16 SpO2: 99 %     Height: 162.6 cm (5' 4\") Weight: 59.2 kg (130 lb 8 oz)  Estimated body mass index is 22.4 kg/m  as calculated from the following:    Height as of this encounter: 1.626 m (5' 4\").    Weight as of this encounter: 59.2 kg (130 lb 8 oz).          "

## 2019-04-04 ENCOUNTER — ALLIED HEALTH/NURSE VISIT (OUTPATIENT)
Dept: NURSING | Facility: CLINIC | Age: 64
End: 2019-04-04
Payer: COMMERCIAL

## 2019-04-04 ENCOUNTER — MYC MEDICAL ADVICE (OUTPATIENT)
Dept: INTERNAL MEDICINE | Facility: CLINIC | Age: 64
End: 2019-04-04

## 2019-04-04 DIAGNOSIS — R00.2 PALPITATIONS: ICD-10-CM

## 2019-04-04 DIAGNOSIS — R00.2 PALPITATIONS: Primary | ICD-10-CM

## 2019-04-04 PROCEDURE — 93000 ELECTROCARDIOGRAM COMPLETE: CPT

## 2019-04-05 NOTE — PROGRESS NOTES
Patient came in for an EKG because she is having palpitations again.  This showed she is having PACs.  Advised about what these are, causes, triggers such as caffeine, fatigue and stress.  But canceled the event recorder since were able to catch an episode.  She is comfortable monitoring.

## 2019-05-05 ENCOUNTER — OFFICE VISIT (OUTPATIENT)
Dept: URGENT CARE | Facility: URGENT CARE | Age: 64
End: 2019-05-05
Payer: COMMERCIAL

## 2019-05-05 VITALS
TEMPERATURE: 98.8 F | OXYGEN SATURATION: 99 % | HEART RATE: 71 BPM | RESPIRATION RATE: 16 BRPM | HEIGHT: 64 IN | WEIGHT: 130 LBS | DIASTOLIC BLOOD PRESSURE: 66 MMHG | BODY MASS INDEX: 22.2 KG/M2 | SYSTOLIC BLOOD PRESSURE: 110 MMHG

## 2019-05-05 DIAGNOSIS — R05.8 PRODUCTIVE COUGH: Primary | ICD-10-CM

## 2019-05-05 PROCEDURE — 99213 OFFICE O/P EST LOW 20 MIN: CPT | Performed by: FAMILY MEDICINE

## 2019-05-05 RX ORDER — AZITHROMYCIN 250 MG/1
TABLET, FILM COATED ORAL
Qty: 6 TABLET | Refills: 0 | Status: SHIPPED | OUTPATIENT
Start: 2019-05-05 | End: 2019-06-17

## 2019-05-05 ASSESSMENT — MIFFLIN-ST. JEOR: SCORE: 1134.68

## 2019-05-05 NOTE — PROGRESS NOTES
SUBJECTIVE:  Chief Complaint   Patient presents with     URI     productive cough x 2 weeks and getting worse, tight chest, some fatigue, cogestion, took some Ibuprofen     Le Vora is a 63 year old female who presents to the clinic today with a chief complaint of cough  and central chest pain. for 2 week(s).  Patient denies pleuritic chest pain and wheezing.  Her cough is described as persistent, daytime, nightime and productive of yellow sputum.    The patient's symptoms are moderate and worsening.  Associated symptoms include nasal congestion, malaise, shortness of breath with exertion, sore throat and headache. The patient's symptoms are exacerbated by exercise  Patient has been using OTC cough suppressants  to improve symptoms.    Past Medical History:   Diagnosis Date     Basal cell carcinoma nos 2010     Mitral valve disorders(424.0)     reported MVP; no regurg     Slow transit constipation     partial intussecption seen on sigmoid     Tension headache      Unspecified hypothyroidism      Patient Active Problem List   Diagnosis     Advanced directives, counseling/discussion     Osteoporosis     Hypothyroidism due to acquired atrophy of thyroid     Benign essential hypertension     CARDIOVASCULAR SCREENING; LDL GOAL LESS THAN 130       ALLERGIES:  Carbocaine [mepivacaine] and Tetracycline      Current Outpatient Medications on File Prior to Visit:  Acetaminophen (TYLENOL PO) Take 325 mg by mouth   alendronate (FOSAMAX) 70 MG tablet Take 1 tablet (70 mg) by mouth every 7 days   calcium carbonate-vitamin D (CALTRATE 600+D) 600-400 MG-UNIT CHEW Take 1 chew tab by mouth daily.   Ibuprofen (ADVIL PO) Take 200 mg by mouth   levothyroxine (SYNTHROID/LEVOTHROID) 100 MCG tablet Take 1 tablet (100 mcg) by mouth daily   lisinopril (PRINIVIL/ZESTRIL) 20 MG tablet Take 1 tablet (20 mg) by mouth daily   RaNITidine HCl (ZANTAC PO) Take by mouth as needed for heartburn   VITAMIN D, CHOLECALCIFEROL, PO Take 1,000  "Units by mouth daily     No current facility-administered medications on file prior to visit.     Social History     Tobacco Use     Smoking status: Never Smoker     Smokeless tobacco: Never Used   Substance Use Topics     Alcohol use: No       Family History   Problem Relation Age of Onset     Diabetes Mother      C.A.D. Mother      Cancer Father         Myeloma     Hypertension Brother          ROS  CONSTITUTIONAL:NEGATIVE for fever, chills,    INTEGUMENTARY/SKIN: NEGATIVE for worrisome rashes,    GI: NEGATIVE for nausea, abdominal pain,     OBJECTIVE:  /66 (BP Location: Right arm, Patient Position: Chair, Cuff Size: Adult Regular)   Pulse 71   Temp 98.8  F (37.1  C) (Oral)   Resp 16   Ht 1.626 m (5' 4\")   Wt 59 kg (130 lb)   LMP 03/01/2004   SpO2 99%   BMI 22.31 kg/m    GENERAL APPEARANCE: alert, moderate distress, cooperative and fatigued  EYES: EOMI,  PERRL, conjunctiva clear  HENT: ear canals and TM's normal.  Nose and mouth without ulcers, erythema or lesions  NECK: supple, nontender, no lymphadenopathy  RESP: lungs clear to auscultation - no rales, rhonchi or wheezes  CV: regular rates and rhythm, normal S1 S2, no murmur noted  NEURO: Normal strength and tone, sensory exam grossly normal,  normal speech and mentation  SKIN: no suspicious lesions or rashes     ASSESSMENT:    Productive cough      - azithromycin (ZITHROMAX) 250 MG tablet; 2 tablets day 1 then 1 tablet daily for 4 days      We discussed that based on the patient's description of symptoms, history and physical exam, that a bacterial infection has likely developed in the chest requiring treatment with antibiotics.    The patient is advised to monitor the symptoms of the illness, and if worsening,  worse chest pain, increased productive sputum, persistent fevers/ chills, shortness of breath, then seek re-evaluation with primary care, UC or ER     Symptomatic measures encouraged, humidified air, plenty of fluids.  Patient may consider " OTC expectorant and/or cough suppressant to treat symptoms.   acetaminophen, ibuprofen for pain and fever    Return if worsening

## 2019-05-05 NOTE — PATIENT INSTRUCTIONS

## 2019-06-17 ENCOUNTER — OFFICE VISIT (OUTPATIENT)
Dept: INTERNAL MEDICINE | Facility: CLINIC | Age: 64
End: 2019-06-17
Payer: COMMERCIAL

## 2019-06-17 VITALS
BODY MASS INDEX: 23 KG/M2 | WEIGHT: 134.7 LBS | SYSTOLIC BLOOD PRESSURE: 120 MMHG | OXYGEN SATURATION: 99 % | RESPIRATION RATE: 16 BRPM | HEART RATE: 68 BPM | HEIGHT: 64 IN | DIASTOLIC BLOOD PRESSURE: 72 MMHG | TEMPERATURE: 98.2 F

## 2019-06-17 DIAGNOSIS — R00.2 PALPITATIONS: Primary | ICD-10-CM

## 2019-06-17 DIAGNOSIS — R53.83 FATIGUE, UNSPECIFIED TYPE: ICD-10-CM

## 2019-06-17 DIAGNOSIS — E03.4 HYPOTHYROIDISM DUE TO ACQUIRED ATROPHY OF THYROID: ICD-10-CM

## 2019-06-17 LAB
BASOPHILS # BLD AUTO: 0 10E9/L (ref 0–0.2)
BASOPHILS NFR BLD AUTO: 0.3 %
DIFFERENTIAL METHOD BLD: NORMAL
EOSINOPHIL # BLD AUTO: 0.1 10E9/L (ref 0–0.7)
EOSINOPHIL NFR BLD AUTO: 0.8 %
ERYTHROCYTE [DISTWIDTH] IN BLOOD BY AUTOMATED COUNT: 12.8 % (ref 10–15)
HCT VFR BLD AUTO: 36.9 % (ref 35–47)
HGB BLD-MCNC: 11.8 G/DL (ref 11.7–15.7)
LYMPHOCYTES # BLD AUTO: 1.6 10E9/L (ref 0.8–5.3)
LYMPHOCYTES NFR BLD AUTO: 26.9 %
MCH RBC QN AUTO: 30.6 PG (ref 26.5–33)
MCHC RBC AUTO-ENTMCNC: 32 G/DL (ref 31.5–36.5)
MCV RBC AUTO: 96 FL (ref 78–100)
MONOCYTES # BLD AUTO: 0.4 10E9/L (ref 0–1.3)
MONOCYTES NFR BLD AUTO: 5.9 %
NEUTROPHILS # BLD AUTO: 3.9 10E9/L (ref 1.6–8.3)
NEUTROPHILS NFR BLD AUTO: 66.1 %
PLATELET # BLD AUTO: 232 10E9/L (ref 150–450)
RBC # BLD AUTO: 3.86 10E12/L (ref 3.8–5.2)
WBC # BLD AUTO: 6 10E9/L (ref 4–11)

## 2019-06-17 PROCEDURE — 85025 COMPLETE CBC W/AUTO DIFF WBC: CPT | Performed by: INTERNAL MEDICINE

## 2019-06-17 PROCEDURE — 36415 COLL VENOUS BLD VENIPUNCTURE: CPT | Performed by: INTERNAL MEDICINE

## 2019-06-17 PROCEDURE — 84443 ASSAY THYROID STIM HORMONE: CPT | Performed by: INTERNAL MEDICINE

## 2019-06-17 PROCEDURE — 80048 BASIC METABOLIC PNL TOTAL CA: CPT | Performed by: INTERNAL MEDICINE

## 2019-06-17 PROCEDURE — 99214 OFFICE O/P EST MOD 30 MIN: CPT | Performed by: INTERNAL MEDICINE

## 2019-06-17 ASSESSMENT — MIFFLIN-ST. JEOR: SCORE: 1151

## 2019-06-17 NOTE — PROGRESS NOTES
"Subjective     Le Vora is a 64 year old female who presents to clinic today for the following health issues:    HPI   1.  Fatigue: She complains that she worked a long day 4 days ago, walked a lot.  She felt very tired afterwards.  The next day she slept up to 6 hours, yesterday she was still very tired.  She slept better last night she thought and feels a little less fatigued this morning.  She has felt like this before, will get very tired, sleep a lot, then seems to do better.  She sometimes will sleep 10 hours.  Her sleep pattern is she will be up around 435 days a week and usually is getting 6 hours a night, occasionally 7.  She will often nap later in the day.  She wakens 2-3 times at night, not clear what awakens her.  She knows she does snore,  but no one has told her  that she stops breathing.  She tried melatonin without benefit.    2.  Irregular heartbeat: She feels this at night, a fluttering feeling that she will be aware of when she lies down.  She is not really aware of it at other times during the day.  Sometimes it makes her feel she has to catch her breath.  She has had this a while, came in in April and a rhythm strip showed premature beats.  She feels like that is what she feels with a \"flutter\".      Patient Active Problem List   Diagnosis     Advanced directives, counseling/discussion     Osteoporosis     Hypothyroidism due to acquired atrophy of thyroid     Benign essential hypertension     CARDIOVASCULAR SCREENING; LDL GOAL LESS THAN 130     Current Outpatient Medications   Medication Sig Dispense Refill     Acetaminophen (TYLENOL PO) Take 325 mg by mouth       alendronate (FOSAMAX) 70 MG tablet Take 1 tablet (70 mg) by mouth every 7 days 12 tablet 3     calcium carbonate-vitamin D (CALTRATE 600+D) 600-400 MG-UNIT CHEW Take 1 chew tab by mouth daily. 180 tablet 3     Cetirizine HCl (ZYRTEC ALLERGY PO) Take by mouth as needed       Ibuprofen (ADVIL PO) Take 200 mg by mouth       " "levothyroxine (SYNTHROID/LEVOTHROID) 100 MCG tablet Take 1 tablet (100 mcg) by mouth daily 90 tablet 3     lisinopril (PRINIVIL/ZESTRIL) 20 MG tablet Take 1 tablet (20 mg) by mouth daily 90 tablet 3     RaNITidine HCl (ZANTAC PO) Take by mouth as needed for heartburn       VITAMIN D, CHOLECALCIFEROL, PO Take 1,000 Units by mouth daily        Social History     Tobacco Use     Smoking status: Never Smoker     Smokeless tobacco: Never Used   Substance Use Topics     Alcohol use: No     Drug use: No              Reviewed and updated as needed this visit by Provider         Review of Systems   No fever, chills, night sweats, new constipation, heartburn, excessive sweatiness.  She has had some loose stools for a couple days.  No dyspnea on exertion, PND, orthopnea, syncope, edema, nausea, vomiting, dental pain, loss of appetite.        Objective    /72   Pulse 68   Temp 98.2  F (36.8  C) (Oral)   Resp 16   Ht 1.626 m (5' 4\")   Wt 61.1 kg (134 lb 11.2 oz)   LMP 03/01/2004   SpO2 99%   BMI 23.12 kg/m    Body mass index is 23.12 kg/m .  Physical Exam     CV: Mostly regular S1, S2, I did hear to prematures.  Lungs: Clear  No cervical adenopathy or thyromegaly          Assessment & Plan     1. Palpitations  She is describing a little bit different sensation at night which probably is still her prematures but suggest we do an event recorder for couple weeks to document episodes and ensure there is not some other dysrhythmia.  - Cardiac Event Monitor Adult Pediatric; Future    2. Fatigue, unspecified type  Suspect her underlying issues are related to lack of good quality sleep and long enough sleep.  We will go ahead and check some labs and rule out metabolic causes, consider sleep evaluation.  May try bumping up her melatonin for short time  - Basic metabolic panel  (Ca, Cl, CO2, Creat, Gluc, K, Na, BUN)  - CBC with platelets and differential  - TSH with free T4 reflex    3. Hypothyroidism due to acquired atrophy " of thyroid  Recheck labs are no symptoms suggesting that her dose is off  - TSH with free T4 reflex           No follow-ups on file.    Brii Sherwood MD  [facility]

## 2019-06-18 LAB
ANION GAP SERPL CALCULATED.3IONS-SCNC: 7 MMOL/L (ref 3–14)
BUN SERPL-MCNC: 16 MG/DL (ref 7–30)
CALCIUM SERPL-MCNC: 8.7 MG/DL (ref 8.5–10.1)
CHLORIDE SERPL-SCNC: 106 MMOL/L (ref 94–109)
CO2 SERPL-SCNC: 30 MMOL/L (ref 20–32)
CREAT SERPL-MCNC: 0.79 MG/DL (ref 0.52–1.04)
GFR SERPL CREATININE-BSD FRML MDRD: 79 ML/MIN/{1.73_M2}
GLUCOSE SERPL-MCNC: 85 MG/DL (ref 70–99)
POTASSIUM SERPL-SCNC: 4.4 MMOL/L (ref 3.4–5.3)
SODIUM SERPL-SCNC: 143 MMOL/L (ref 133–144)
TSH SERPL DL<=0.005 MIU/L-ACNC: 2.2 MU/L (ref 0.4–4)

## 2019-06-20 ENCOUNTER — HOSPITAL ENCOUNTER (OUTPATIENT)
Dept: CARDIOLOGY | Facility: CLINIC | Age: 64
Discharge: HOME | End: 2019-06-20
Attending: INTERNAL MEDICINE | Admitting: INTERNAL MEDICINE
Payer: COMMERCIAL

## 2019-06-20 DIAGNOSIS — R00.2 PALPITATIONS: ICD-10-CM

## 2019-06-20 PROCEDURE — 93272 ECG/REVIEW INTERPRET ONLY: CPT | Performed by: INTERNAL MEDICINE

## 2019-06-20 PROCEDURE — 93270 REMOTE 30 DAY ECG REV/REPORT: CPT

## 2019-07-31 ENCOUNTER — OFFICE VISIT (OUTPATIENT)
Dept: INTERNAL MEDICINE | Facility: CLINIC | Age: 64
End: 2019-07-31
Payer: COMMERCIAL

## 2019-07-31 VITALS
HEIGHT: 64 IN | TEMPERATURE: 97.5 F | DIASTOLIC BLOOD PRESSURE: 70 MMHG | WEIGHT: 129.5 LBS | OXYGEN SATURATION: 100 % | RESPIRATION RATE: 16 BRPM | SYSTOLIC BLOOD PRESSURE: 132 MMHG | HEART RATE: 80 BPM | BODY MASS INDEX: 22.11 KG/M2

## 2019-07-31 DIAGNOSIS — F41.9 ANXIETY: ICD-10-CM

## 2019-07-31 DIAGNOSIS — M54.42 ACUTE BILATERAL LOW BACK PAIN WITH LEFT-SIDED SCIATICA: Primary | ICD-10-CM

## 2019-07-31 DIAGNOSIS — R55 NEAR SYNCOPE: ICD-10-CM

## 2019-07-31 LAB
ALBUMIN UR-MCNC: NEGATIVE MG/DL
APPEARANCE UR: CLEAR
BACTERIA #/AREA URNS HPF: ABNORMAL /HPF
BILIRUB UR QL STRIP: NEGATIVE
COLOR UR AUTO: YELLOW
GLUCOSE UR STRIP-MCNC: NEGATIVE MG/DL
HGB UR QL STRIP: ABNORMAL
KETONES UR STRIP-MCNC: NEGATIVE MG/DL
LEUKOCYTE ESTERASE UR QL STRIP: NEGATIVE
NITRATE UR QL: NEGATIVE
NON-SQ EPI CELLS #/AREA URNS LPF: ABNORMAL /LPF
PH UR STRIP: 8 PH (ref 5–7)
RBC #/AREA URNS AUTO: ABNORMAL /HPF
SOURCE: ABNORMAL
SP GR UR STRIP: 1.01 (ref 1–1.03)
UROBILINOGEN UR STRIP-ACNC: 0.2 EU/DL (ref 0.2–1)
WBC #/AREA URNS AUTO: ABNORMAL /HPF

## 2019-07-31 PROCEDURE — 99214 OFFICE O/P EST MOD 30 MIN: CPT | Performed by: INTERNAL MEDICINE

## 2019-07-31 PROCEDURE — 81001 URINALYSIS AUTO W/SCOPE: CPT | Performed by: INTERNAL MEDICINE

## 2019-07-31 RX ORDER — CYCLOBENZAPRINE HCL 10 MG
10 TABLET ORAL 3 TIMES DAILY PRN
Qty: 90 TABLET | Refills: 0 | Status: SHIPPED | OUTPATIENT
Start: 2019-07-31 | End: 2021-04-08

## 2019-07-31 ASSESSMENT — MIFFLIN-ST. JEOR: SCORE: 1127.41

## 2019-07-31 NOTE — LETTER
[place] [address]  [facility] [address]  [phone]          July 31, 2019    RE:  Le Vora                                                                                                                                                       [address] [facility] [address]            To whom it may concern:    Le Vora is under my professional care. She will be unable to work until August 5th.     If you have any questions or if you need additional information in regard to this matter, please feel free to call or contact me at [facility], [address], [place] [address] or phone # [phone].         Sincerely,        Judy Almaraz MD

## 2019-07-31 NOTE — PROGRESS NOTES
"Subjective     Le Vora is a 64 year old female who presents to clinic today for the following health issues:    HPI     Low left back pain since picking beans 2 days ago. She has had this before. Pain is in her left low back. Does not radiate. She has been icing it. Does not seem to be getting better. This morning she suddenly felt very light headed, heart racing and sweaty like she might pass out. Feels some better now but says she is anxious. She feels best if she is up pacing. But interestingly says it does not help her pain. Helps her anxiety and lightheadedness.. For the past few months has been struggling with some anxiety. 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 Her  has retired and she feels she has to work a few more years for financial reasons. She had a recent event recorder that was unremarkable. A few PACs.       BP Readings from Last 3 Encounters:   07/31/19 132/70   06/17/19 120/72   05/05/19 110/66    Wt Readings from Last 3 Encounters:   07/31/19 58.7 kg (129 lb 8 oz)   06/17/19 61.1 kg (134 lb 11.2 oz)   05/05/19 59 kg (130 lb)                 Reviewed and updated as needed this visit by Provider         Review of Systems   ROS COMP: Constitutional, HEENT, cardiovascular, pulmonary, GI, , musculoskeletal, neuro, skin, endocrine and psych systems are negative, except as otherwise noted.      Objective    /70 (BP Location: Right arm, Patient Position: Chair, Cuff Size: Adult Large)   Pulse 80   Temp 97.5  F (36.4  C) (Oral)   Resp 16   Ht 1.626 m (5' 4\")   Wt 58.7 kg (129 lb 8 oz)   LMP 03/01/2004   SpO2 100%   Breastfeeding? No   BMI 22.23 kg/m    Body mass index is 22.23 kg/m .  Physical Exam   GENERAL: healthy, alert and no distress  RESP: lungs clear to auscultation - no rales, rhonchi or wheezes  CV: regular rate and rhythm, normal S1 S2, no S3 or S4, no murmur, click or rub, no peripheral edema and peripheral pulses " strong  ABDOMEN: soft, nontender, no hepatosplenomegaly, no masses and bowel sounds normal  MS: back limited range of motion she leans to the left, otherwise gait is fairly normal   PSYCH: mentation appears normal, anxious and tense          Assessment & Plan     1. Acute bilateral low back pain with left-sided sciatica  The patient is advised to apply ice or cold packs intermittently as needed to relieve pain. Given Rx for Fl;exeril and note for work. Follow up in 1 week   - cyclobenzaprine (FLEXERIL) 10 MG tablet; Take 1 tablet (10 mg) by mouth 3 times daily as needed for muscle spasms  Dispense: 90 tablet; Refill: 0    2. Near syncope  I think was a panic reaction or vasavagal. Will follow clinically for now. And will check UA/UC to be sure nothing else going on.  - UA with Microscopic reflex to Culture        3. Anxiety  As I listen to her she is having a lot of anxiety that she does not want to admit. Will address further next week.          No follow-ups on file.    Judy Almaraz MD  [facility]

## 2019-10-01 ENCOUNTER — HEALTH MAINTENANCE LETTER (OUTPATIENT)
Age: 64
End: 2019-10-01

## 2019-12-12 ENCOUNTER — HOSPITAL ENCOUNTER (OUTPATIENT)
Dept: MAMMOGRAPHY | Facility: CLINIC | Age: 64
Discharge: HOME | End: 2019-12-12
Attending: INTERNAL MEDICINE | Admitting: INTERNAL MEDICINE
Payer: COMMERCIAL

## 2019-12-12 DIAGNOSIS — Z12.31 VISIT FOR SCREENING MAMMOGRAM: ICD-10-CM

## 2019-12-12 PROCEDURE — 77063 BREAST TOMOSYNTHESIS BI: CPT

## 2019-12-15 ENCOUNTER — HEALTH MAINTENANCE LETTER (OUTPATIENT)
Age: 64
End: 2019-12-15

## 2019-12-19 ENCOUNTER — TRANSFERRED RECORDS (OUTPATIENT)
Dept: HEALTH INFORMATION MANAGEMENT | Facility: CLINIC | Age: 64
End: 2019-12-19

## 2019-12-26 ENCOUNTER — MYC MEDICAL ADVICE (OUTPATIENT)
Dept: INTERNAL MEDICINE | Facility: CLINIC | Age: 64
End: 2019-12-26

## 2019-12-26 DIAGNOSIS — I10 BENIGN ESSENTIAL HYPERTENSION: ICD-10-CM

## 2019-12-26 DIAGNOSIS — E03.4 HYPOTHYROIDISM DUE TO ACQUIRED ATROPHY OF THYROID: ICD-10-CM

## 2019-12-26 RX ORDER — LISINOPRIL 20 MG/1
20 TABLET ORAL DAILY
Qty: 90 TABLET | Refills: 1 | Status: SHIPPED | OUTPATIENT
Start: 2019-12-26 | End: 2020-07-09

## 2019-12-26 RX ORDER — LEVOTHYROXINE SODIUM 100 UG/1
100 TABLET ORAL DAILY
Qty: 90 TABLET | Refills: 1 | Status: SHIPPED | OUTPATIENT
Start: 2019-12-26 | End: 2020-07-09

## 2019-12-26 NOTE — TELEPHONE ENCOUNTER
"Requested Prescriptions   Pending Prescriptions Disp Refills     levothyroxine (SYNTHROID/LEVOTHROID) 100 MCG tablet 90 tablet 3     Sig: Take 1 tablet (100 mcg) by mouth daily       Thyroid Protocol Passed - 12/26/2019 11:03 AM        Passed - Patient is 12 years or older        Passed - Recent (12 mo) or future (30 days) visit within the authorizing provider's specialty     Patient has had an office visit with the authorizing provider or a provider within the authorizing providers department within the previous 12 mos or has a future within next 30 days. See \"Patient Info\" tab in inbasket, or \"Choose Columns\" in Meds & Orders section of the refill encounter.              Passed - Medication is active on med list        Passed - Normal TSH on file in past 12 months     Recent Labs   Lab Test 06/17/19  1503   TSH 2.20              Passed - No active pregnancy on record     If patient is pregnant or has had a positive pregnancy test, please check TSH.          Passed - No positive pregnancy test in past 12 months     If patient is pregnant or has had a positive pregnancy test, please check TSH.          lisinopril (PRINIVIL/ZESTRIL) 20 MG tablet 90 tablet 3     Sig: Take 1 tablet (20 mg) by mouth daily       ACE Inhibitors (Including Combos) Protocol Passed - 12/26/2019 11:03 AM        Passed - Blood pressure under 140/90 in past 12 months     BP Readings from Last 3 Encounters:   07/31/19 132/70   06/17/19 120/72   05/05/19 110/66                 Passed - Recent (12 mo) or future (30 days) visit within the authorizing provider's specialty     Patient has had an office visit with the authorizing provider or a provider within the authorizing providers department within the previous 12 mos or has a future within next 30 days. See \"Patient Info\" tab in inbasket, or \"Choose Columns\" in Meds & Orders section of the refill encounter.              Passed - Medication is active on med list        Passed - Patient is age 18 " or older        Passed - No active pregnancy on record        Passed - Normal serum creatinine on file in past 12 months     Recent Labs   Lab Test 06/17/19  1503   CR 0.79             Passed - Normal serum potassium on file in past 12 months     Recent Labs   Lab Test 06/17/19  1503   POTASSIUM 4.4             Passed - No positive pregnancy test within past 12 months

## 2020-02-13 ENCOUNTER — TRANSFERRED RECORDS (OUTPATIENT)
Dept: HEALTH INFORMATION MANAGEMENT | Facility: CLINIC | Age: 65
End: 2020-02-13

## 2020-03-06 ENCOUNTER — OFFICE VISIT (OUTPATIENT)
Dept: URGENT CARE | Facility: URGENT CARE | Age: 65
End: 2020-03-06
Payer: COMMERCIAL

## 2020-03-06 VITALS
WEIGHT: 134 LBS | OXYGEN SATURATION: 98 % | HEIGHT: 64 IN | DIASTOLIC BLOOD PRESSURE: 82 MMHG | TEMPERATURE: 98.9 F | SYSTOLIC BLOOD PRESSURE: 143 MMHG | HEART RATE: 58 BPM | BODY MASS INDEX: 22.88 KG/M2

## 2020-03-06 DIAGNOSIS — J22 LOWER RESPIRATORY TRACT INFECTION: Primary | ICD-10-CM

## 2020-03-06 PROCEDURE — 99213 OFFICE O/P EST LOW 20 MIN: CPT | Performed by: PHYSICIAN ASSISTANT

## 2020-03-06 RX ORDER — AZITHROMYCIN 250 MG/1
TABLET, FILM COATED ORAL
Qty: 6 TABLET | Refills: 0 | Status: SHIPPED | OUTPATIENT
Start: 2020-03-06 | End: 2020-03-11

## 2020-03-06 ASSESSMENT — ENCOUNTER SYMPTOMS
SINUS PRESSURE: 1
COUGH: 1
VOMITING: 0
FEVER: 1
DIARRHEA: 0
SORE THROAT: 0
NAUSEA: 0

## 2020-03-06 ASSESSMENT — MIFFLIN-ST. JEOR: SCORE: 1147.82

## 2020-03-06 NOTE — PROGRESS NOTES
SUBJECTIVE:   Le Vora is a 64 year old female presenting with a chief complaint of   Chief Complaint   Patient presents with     Cough     over a week ago -  worse /  sick in bed for two days - low grade fever       She is an established patient of [facility].    URI Adult    Onset of symptoms was 10 day(s) ago.  Course of illness is worsening.    Severity moderate  Current and Associated symptoms: cough - productive, headache, low grade fever, sinus congestion  Denies v/d.  Treatment measures tried include Tylenol/Ibuprofen and OTC Cough med.  Predisposing factors include None.  No recent travel.      Review of Systems   Constitutional: Positive for fever (low grade).   HENT: Positive for congestion and sinus pressure. Negative for sore throat.    Respiratory: Positive for cough.    Gastrointestinal: Negative for diarrhea, nausea and vomiting.       Past Medical History:   Diagnosis Date     Basal cell carcinoma nos 2010     Mitral valve disorders(424.0)     reported MVP; no regurg     Slow transit constipation     partial intussecption seen on sigmoid     Tension headache      Unspecified hypothyroidism      Family History   Problem Relation Age of Onset     Diabetes Mother      C.A.D. Mother      Cancer Father         Myeloma     Hypertension Brother      Current Outpatient Medications   Medication Sig Dispense Refill     Acetaminophen (TYLENOL PO) Take 325 mg by mouth       azithromycin (ZITHROMAX) 250 MG tablet Take 2 tablets (500 mg) by mouth daily for 1 day, THEN 1 tablet (250 mg) daily for 4 days. 6 tablet 0     calcium carbonate-vitamin D (CALTRATE 600+D) 600-400 MG-UNIT CHEW Take 1 chew tab by mouth daily. 180 tablet 3     Cetirizine HCl (ZYRTEC ALLERGY PO) Take by mouth as needed       cyclobenzaprine (FLEXERIL) 10 MG tablet Take 1 tablet (10 mg) by mouth 3 times daily as needed for muscle spasms 90 tablet 0     Ibuprofen (ADVIL PO) Take 200 mg by mouth       levothyroxine (SYNTHROID/LEVOTHROID)  "100 MCG tablet Take 1 tablet (100 mcg) by mouth daily 90 tablet 1     lisinopril (PRINIVIL/ZESTRIL) 20 MG tablet Take 1 tablet (20 mg) by mouth daily 90 tablet 1     RaNITidine HCl (ZANTAC PO) Take by mouth as needed for heartburn       VITAMIN D, CHOLECALCIFEROL, PO Take 1,000 Units by mouth daily       alendronate (FOSAMAX) 70 MG tablet Take 1 tablet (70 mg) by mouth every 7 days (Patient not taking: Reported on 3/6/2020) 12 tablet 3     Social History     Tobacco Use     Smoking status: Never Smoker     Smokeless tobacco: Never Used   Substance Use Topics     Alcohol use: No       OBJECTIVE  BP (!) 143/82 (BP Location: Right arm, Patient Position: Chair, Cuff Size: Adult Regular)   Pulse 58   Temp 98.9  F (37.2  C) (Oral)   Ht 1.626 m (5' 4\")   Wt 60.8 kg (134 lb)   LMP 03/01/2004   SpO2 98%   BMI 23.00 kg/m      Physical Exam  Constitutional:       General: She is not in acute distress.     Appearance: She is well-developed.   HENT:      Head: Normocephalic and atraumatic.      Right Ear: Tympanic membrane and external ear normal.      Left Ear: Tympanic membrane and external ear normal.      Mouth/Throat:      Mouth: Mucous membranes are moist.      Pharynx: Oropharynx is clear.   Eyes:      Conjunctiva/sclera: Conjunctivae normal.   Neck:      Musculoskeletal: Normal range of motion.   Cardiovascular:      Rate and Rhythm: Regular rhythm.      Heart sounds: Normal heart sounds.   Pulmonary:      Effort: Pulmonary effort is normal. No respiratory distress.      Breath sounds: Rhonchi present. No wheezing or rales.   Skin:     General: Skin is warm and dry.   Neurological:      Mental Status: She is alert.         Labs:  No results found for this or any previous visit (from the past 24 hour(s)).        ASSESSMENT:      ICD-10-CM    1. Lower respiratory tract infection J22 azithromycin (ZITHROMAX) 250 MG tablet            PLAN:    Lower respiratory tract infection: Zithromax is prescribed today.  OTC cough " medication as needed.  Tylenol or Motrin as needed for fever.  Follow-up if any worsening symptoms.  Patient agrees with the plan.    Followup:    If not improving or if condition worsens, follow up with your Primary Care Provider    Patient Instructions   Le to follow up with Primary Care provider regarding elevated blood pressure.

## 2020-07-08 DIAGNOSIS — E03.4 HYPOTHYROIDISM DUE TO ACQUIRED ATROPHY OF THYROID: ICD-10-CM

## 2020-07-08 DIAGNOSIS — I10 BENIGN ESSENTIAL HYPERTENSION: ICD-10-CM

## 2020-07-08 NOTE — TELEPHONE ENCOUNTER
"Routing refill request to provider for review/approval because:  Failed protocol  Patient has upcoming appointment for lab work and office visit.      Next 5 appointments (look out 90 days)    Jul 30, 2020  4:00 PM CDT  PHYSICAL with Brii Sherwood MD  [facility] [address]  [facility] [address]  [phone]            Requested Prescriptions   Pending Prescriptions Disp Refills     lisinopril (ZESTRIL) 20 MG tablet [Pharmacy Med Name: LISINOPRIL 20MG TABS] 90 tablet 1     Sig: TAKE ONE TABLET BY MOUTH ONCE DAILY       ACE Inhibitors (Including Combos) Protocol Failed - 7/8/2020  2:24 PM        Failed - Blood pressure under 140/90 in past 12 months     BP Readings from Last 3 Encounters:   03/06/20 (!) 143/82   07/31/19 132/70   06/17/19 120/72                 Failed - Normal serum creatinine on file in past 12 months     Recent Labs   Lab Test 06/17/19  1503   CR 0.79       Ok to refill medication if creatinine is low          Failed - Normal serum potassium on file in past 12 months     Recent Labs   Lab Test 06/17/19  1503   POTASSIUM 4.4             Passed - Recent (12 mo) or future (30 days) visit within the authorizing provider's specialty     Patient has had an office visit with the authorizing provider or a provider within the authorizing providers department within the previous 12 mos or has a future within next 30 days. See \"Patient Info\" tab in inbasket, or \"Choose Columns\" in Meds & Orders section of the refill encounter.              Passed - Medication is active on med list        Passed - Patient is age 18 or older        Passed - No active pregnancy on record        Passed - No positive pregnancy test within past 12 months           levothyroxine (SYNTHROID/LEVOTHROID) 100 MCG tablet 90 tablet 1     Sig: Take 1 tablet (100 mcg) by mouth daily       Thyroid Protocol Failed - 7/8/2020  2:24 PM        Failed - Normal TSH on file in past 12 " "months     Recent Labs   Lab Test 06/17/19  1503   TSH 2.20              Passed - Patient is 12 years or older        Passed - Recent (12 mo) or future (30 days) visit within the authorizing provider's specialty     Patient has had an office visit with the authorizing provider or a provider within the authorizing providers department within the previous 12 mos or has a future within next 30 days. See \"Patient Info\" tab in inbasket, or \"Choose Columns\" in Meds & Orders section of the refill encounter.              Passed - Medication is active on med list        Passed - No active pregnancy on record     If patient is pregnant or has had a positive pregnancy test, please check TSH.          Passed - No positive pregnancy test in past 12 months     If patient is pregnant or has had a positive pregnancy test, please check TSH.               "

## 2020-07-09 RX ORDER — LEVOTHYROXINE SODIUM 100 UG/1
100 TABLET ORAL DAILY
Qty: 90 TABLET | Refills: 1 | Status: SHIPPED | OUTPATIENT
Start: 2020-07-09 | End: 2021-01-29

## 2020-07-09 RX ORDER — LISINOPRIL 20 MG/1
TABLET ORAL
Qty: 90 TABLET | Refills: 1 | Status: SHIPPED | OUTPATIENT
Start: 2020-07-09 | End: 2020-10-08 | Stop reason: DRUGHIGH

## 2020-07-21 ENCOUNTER — DOCUMENTATION ONLY (OUTPATIENT)
Dept: INTERNAL MEDICINE | Facility: CLINIC | Age: 65
End: 2020-07-21

## 2020-07-23 NOTE — PROGRESS NOTES
These labs are scheduled before her physical.  Please advise the patient that she better check with her insurance to make sure they will cover the labs to be done before the physical some insurance do not.  I usually recommend waiting and scheduling a lab after the physical or at the physical so that if there are new problems, symptoms or findings, then we do not need to draw blood again.  If she still wants to do them before the visit, I would be ordering a basic metabolic panel, urine protein, thyroid test and a cholesterol panel as these are related to her medical problems.

## 2020-07-24 NOTE — PROGRESS NOTES
Left detailed message. Regarding below. Requested patient call back if she would like lab orders entered or to cancel appointment. Will leave open pending call back.

## 2020-08-20 ENCOUNTER — TRANSFERRED RECORDS (OUTPATIENT)
Dept: HEALTH INFORMATION MANAGEMENT | Facility: CLINIC | Age: 65
End: 2020-08-20

## 2020-10-08 ENCOUNTER — ANCILLARY PROCEDURE (OUTPATIENT)
Dept: BONE DENSITY | Facility: CLINIC | Age: 65
End: 2020-10-08
Attending: INTERNAL MEDICINE
Payer: COMMERCIAL

## 2020-10-08 ENCOUNTER — OFFICE VISIT (OUTPATIENT)
Dept: INTERNAL MEDICINE | Facility: CLINIC | Age: 65
End: 2020-10-08
Payer: COMMERCIAL

## 2020-10-08 VITALS
TEMPERATURE: 97.2 F | SYSTOLIC BLOOD PRESSURE: 122 MMHG | HEIGHT: 64 IN | BODY MASS INDEX: 22.36 KG/M2 | DIASTOLIC BLOOD PRESSURE: 75 MMHG | HEART RATE: 56 BPM | RESPIRATION RATE: 16 BRPM | OXYGEN SATURATION: 100 % | WEIGHT: 131 LBS

## 2020-10-08 DIAGNOSIS — E78.5 HYPERLIPIDEMIA LDL GOAL <130: ICD-10-CM

## 2020-10-08 DIAGNOSIS — E03.4 HYPOTHYROIDISM DUE TO ACQUIRED ATROPHY OF THYROID: ICD-10-CM

## 2020-10-08 DIAGNOSIS — Z78.0 ASYMPTOMATIC POSTMENOPAUSAL STATUS: ICD-10-CM

## 2020-10-08 DIAGNOSIS — Z85.828 HISTORY OF BASAL CELL CARCINOMA: ICD-10-CM

## 2020-10-08 DIAGNOSIS — M81.0 OSTEOPOROSIS WITHOUT CURRENT PATHOLOGICAL FRACTURE, UNSPECIFIED OSTEOPOROSIS TYPE: ICD-10-CM

## 2020-10-08 DIAGNOSIS — Z23 NEEDS FLU SHOT: ICD-10-CM

## 2020-10-08 DIAGNOSIS — I10 BENIGN ESSENTIAL HYPERTENSION: Primary | ICD-10-CM

## 2020-10-08 PROCEDURE — 84100 ASSAY OF PHOSPHORUS: CPT | Performed by: INTERNAL MEDICINE

## 2020-10-08 PROCEDURE — 36415 COLL VENOUS BLD VENIPUNCTURE: CPT | Performed by: INTERNAL MEDICINE

## 2020-10-08 PROCEDURE — 99214 OFFICE O/P EST MOD 30 MIN: CPT | Mod: 25 | Performed by: INTERNAL MEDICINE

## 2020-10-08 PROCEDURE — 80061 LIPID PANEL: CPT | Performed by: INTERNAL MEDICINE

## 2020-10-08 PROCEDURE — 84443 ASSAY THYROID STIM HORMONE: CPT | Performed by: INTERNAL MEDICINE

## 2020-10-08 PROCEDURE — 82043 UR ALBUMIN QUANTITATIVE: CPT | Performed by: INTERNAL MEDICINE

## 2020-10-08 PROCEDURE — 90471 IMMUNIZATION ADMIN: CPT | Performed by: INTERNAL MEDICINE

## 2020-10-08 PROCEDURE — 77080 DXA BONE DENSITY AXIAL: CPT | Performed by: INTERNAL MEDICINE

## 2020-10-08 PROCEDURE — 90682 RIV4 VACC RECOMBINANT DNA IM: CPT | Performed by: INTERNAL MEDICINE

## 2020-10-08 PROCEDURE — 83735 ASSAY OF MAGNESIUM: CPT | Performed by: INTERNAL MEDICINE

## 2020-10-08 PROCEDURE — 80048 BASIC METABOLIC PNL TOTAL CA: CPT | Performed by: INTERNAL MEDICINE

## 2020-10-08 RX ORDER — LISINOPRIL 10 MG/1
10 TABLET ORAL DAILY
Qty: 90 TABLET | Refills: 1 | Status: SHIPPED | OUTPATIENT
Start: 2020-10-08 | End: 2021-04-08

## 2020-10-08 ASSESSMENT — MIFFLIN-ST. JEOR: SCORE: 1129.21

## 2020-10-08 NOTE — PROGRESS NOTES
Subjective     Le Vora is a 65 year old female who presents to clinic today for the following health issues:    HPI       Problems:   1.  Hypertension: This is well controlled, her home blood pressure readings have been 100 to 110's/high 60s to 70s.  She reports no lightheadedness or symptoms of low blood pressure.  She did retire and thinks that may be not working is reduced her anxiety levels.  2.  Osteoporosis: She stopped Fosamax some months ago after having the pill get stuck in her esophagus one time.  She tends to have trouble swallowing pills normally.  She has been very worried about continuing it because of that.  3.  Hypothyroidism: No concerns    Current Concerns:   none    Patient Active Problem List   Diagnosis     Advanced directives, counseling/discussion     Osteoporosis     Hypothyroidism due to acquired atrophy of thyroid     Benign essential hypertension     CARDIOVASCULAR SCREENING; LDL GOAL LESS THAN 130       Current Outpatient Medications   Medication Sig Dispense Refill     Acetaminophen (TYLENOL PO) Take 325 mg by mouth       calcium carbonate-vitamin D (CALTRATE 600+D) 600-400 MG-UNIT CHEW Take 1 chew tab by mouth daily. 180 tablet 3     Cetirizine HCl (ZYRTEC ALLERGY PO) Take by mouth as needed       cyclobenzaprine (FLEXERIL) 10 MG tablet Take 1 tablet (10 mg) by mouth 3 times daily as needed for muscle spasms 90 tablet 0     Ibuprofen (ADVIL PO) Take 200 mg by mouth       levothyroxine (SYNTHROID/LEVOTHROID) 100 MCG tablet Take 1 tablet (100 mcg) by mouth daily 90 tablet 1     lisinopril (ZESTRIL) 10 MG tablet Take 1 tablet (10 mg) by mouth daily 90 tablet 1     omeprazole (PRILOSEC) 20 MG DR capsule Take 1 capsule (20 mg) by mouth daily as needed       VITAMIN D, CHOLECALCIFEROL, PO Take 1,000 Units by mouth daily           Social History     Tobacco Use     Smoking status: Never Smoker     Smokeless tobacco: Never Used   Substance Use Topics     Alcohol use: No         Review of Systems   No fever, chills, no dyspnea on exertion, no chest pain, palpitations, PND, orthopnea, edema, syncope, headache, abdominal pain       Objective    LMP 03/01/2004   There is no height or weight on file to calculate BMI.  Physical Exam     Lungs: clear  CV: normal S1, S2 without murmur, S3 or S4.   Pulses full, no carotid bruits              Assessment & Plan     Benign essential hypertension  Blood pressure does seem to be lower.  Suggest we decrease her lisinopril from 20 mg to 10 mg.  She can monitor it but would not want to change again for several months.  If it remains very low, could consider decreasing or stopping the medication.  Due for labs today.  - lisinopril (ZESTRIL) 10 MG tablet; Take 1 tablet (10 mg) by mouth daily  - Basic metabolic panel  (Ca, Cl, CO2, Creat, Gluc, K, Na, BUN)  - Albumin Random Urine Quantitative with Creat Ratio    Osteoporosis without current pathological fracture, unspecified osteoporosis type  Discussed options and recommend Prolia.  She would be willing to use this.  We will get her labs and then generate the authorization since she had swallowing difficulty and did have the pill get stuck in her esophagus, she is at significant risk for esophageal ulceration from bisphosphonates.  We did discuss possibility of using Evista and she will look into this pending her labs.  - Magnesium  - Phosphorus    Hypothyroidism due to acquired atrophy of thyroid  Clinically stable, recheck  - TSH with free T4 reflex    Hyperlipidemia LDL goal <130  Recheck labs  - Lipid panel reflex to direct LDL Fasting    History of basal cell carcinoma  She would like a new dermatologist so referral done  - DERMATOLOGY ADULT REFERRAL; Future    Needs flu shot    - FLU VAC, QUADRIVALENT (RIV4) RECOMBINANT DNA, IM    Asymptomatic postmenopausal status    - DX Hip/Pelvis/Spine; Future            Return in about 1 year (around 10/8/2021) for Physical Exam.    Brii Sherwood MD  [facility]  [facility]

## 2020-10-09 LAB
ANION GAP SERPL CALCULATED.3IONS-SCNC: 6 MMOL/L (ref 3–14)
BUN SERPL-MCNC: 22 MG/DL (ref 7–30)
CALCIUM SERPL-MCNC: 9.1 MG/DL (ref 8.5–10.1)
CHLORIDE SERPL-SCNC: 109 MMOL/L (ref 94–109)
CHOLEST SERPL-MCNC: 210 MG/DL
CO2 SERPL-SCNC: 25 MMOL/L (ref 20–32)
CREAT SERPL-MCNC: 0.76 MG/DL (ref 0.52–1.04)
GFR SERPL CREATININE-BSD FRML MDRD: 82 ML/MIN/{1.73_M2}
GLUCOSE SERPL-MCNC: 80 MG/DL (ref 70–99)
HDLC SERPL-MCNC: 60 MG/DL
LDLC SERPL CALC-MCNC: 135 MG/DL
MAGNESIUM SERPL-MCNC: 2.1 MG/DL (ref 1.6–2.3)
NONHDLC SERPL-MCNC: 150 MG/DL
PHOSPHATE SERPL-MCNC: 3.8 MG/DL (ref 2.5–4.5)
POTASSIUM SERPL-SCNC: 4.6 MMOL/L (ref 3.4–5.3)
SODIUM SERPL-SCNC: 140 MMOL/L (ref 133–144)
TRIGL SERPL-MCNC: 76 MG/DL
TSH SERPL DL<=0.005 MIU/L-ACNC: 2.33 MU/L (ref 0.4–4)

## 2020-10-10 LAB
CREAT UR-MCNC: 118 MG/DL
MICROALBUMIN UR-MCNC: 9 MG/L
MICROALBUMIN/CREAT UR: 7.68 MG/G CR (ref 0–25)

## 2020-10-15 ENCOUNTER — MYC MEDICAL ADVICE (OUTPATIENT)
Dept: INTERNAL MEDICINE | Facility: CLINIC | Age: 65
End: 2020-10-15

## 2020-10-16 NOTE — TELEPHONE ENCOUNTER
I started the Prolia therapy plan for this patient.  She has had labs done.  She has tried Fosamax however the pill did get stuck in her esophagus as she has some difficulty swallowing pills, causing burning sensation so cannot take an oral bisphosphonate and prefers Prolia.

## 2020-10-16 NOTE — TELEPHONE ENCOUNTER
Per last office visit 10/8/20:  Osteoporosis without current pathological fracture, unspecified osteoporosis type  Discussed options and recommend Prolia.  She would be willing to use this.  We will get her labs and then generate the authorization since she had swallowing difficulty and did have the pill get stuck in her esophagus, she is at significant risk for esophageal ulceration from bisphosphonates.  We did discuss possibility of using Evista and she will look into this pending her labs.  - Magnesium  - Phosphorus    Per result notes 10/8/20:  The LDL is just above ideal but stable. Your kidney tests, creatinine, GFR and urine albumin, are normal.  The rest of the labs are normal. Do you want to go ahead with the Prolia shot or do you want to try Evista, the oral medication that works like estrogen?    Per patient's MyChart message, would like Prolia. Will patient be getting injections in-clinic? Should Prolia process be initiated for patient? Please place any neccessary orders.     Thank you.

## 2020-10-18 ENCOUNTER — MYC MEDICAL ADVICE (OUTPATIENT)
Dept: INTERNAL MEDICINE | Facility: CLINIC | Age: 65
End: 2020-10-18

## 2020-10-18 DIAGNOSIS — M81.0 OSTEOPOROSIS WITHOUT CURRENT PATHOLOGICAL FRACTURE, UNSPECIFIED OSTEOPOROSIS TYPE: Primary | ICD-10-CM

## 2020-10-19 NOTE — TELEPHONE ENCOUNTER
Per patient MyChart message, would like to try Evista, this was mentioned in office visit dictation, 10/08/20. Pharmacy pended, Please advise,     Thank you

## 2020-10-20 RX ORDER — RALOXIFENE HYDROCHLORIDE 60 MG/1
60 TABLET, FILM COATED ORAL DAILY
Qty: 90 TABLET | Refills: 3 | Status: SHIPPED | OUTPATIENT
Start: 2020-10-20 | End: 2021-11-11

## 2020-10-29 ENCOUNTER — MYC MEDICAL ADVICE (OUTPATIENT)
Dept: INTERNAL MEDICINE | Facility: CLINIC | Age: 65
End: 2020-10-29

## 2020-10-29 DIAGNOSIS — N95.2 ATROPHIC VAGINITIS: ICD-10-CM

## 2020-10-30 NOTE — TELEPHONE ENCOUNTER
Routing refill request to provider for review/approval because:  Drug not on the [facility] refill protocol   A break in medication Last Rx sent 10/5/17  Misti Locke RN

## 2020-12-07 ENCOUNTER — TRANSFERRED RECORDS (OUTPATIENT)
Dept: HEALTH INFORMATION MANAGEMENT | Facility: CLINIC | Age: 65
End: 2020-12-07

## 2021-01-15 ENCOUNTER — HEALTH MAINTENANCE LETTER (OUTPATIENT)
Age: 66
End: 2021-01-15

## 2021-01-29 ENCOUNTER — MYC MEDICAL ADVICE (OUTPATIENT)
Dept: INTERNAL MEDICINE | Facility: CLINIC | Age: 66
End: 2021-01-29

## 2021-01-29 DIAGNOSIS — E03.4 HYPOTHYROIDISM DUE TO ACQUIRED ATROPHY OF THYROID: ICD-10-CM

## 2021-01-29 DIAGNOSIS — I10 BENIGN ESSENTIAL HYPERTENSION: ICD-10-CM

## 2021-01-29 RX ORDER — LISINOPRIL 10 MG/1
10 TABLET ORAL DAILY
Qty: 90 TABLET | Refills: 1 | Status: CANCELLED | OUTPATIENT
Start: 2021-01-29

## 2021-01-29 RX ORDER — LEVOTHYROXINE SODIUM 100 UG/1
TABLET ORAL
Qty: 90 TABLET | Refills: 1 | Status: SHIPPED | OUTPATIENT
Start: 2021-01-29 | End: 2021-04-08

## 2021-01-29 NOTE — TELEPHONE ENCOUNTER
Pending Prescriptions:                       Disp   Refills    levothyroxine (SYNTHROID/LEVOTHROID) 100 *90 tab*1            Sig: TAKE ONE TABLET BY MOUTH ONCE DAILY    Prescription approved per [facility] [place].

## 2021-03-20 ENCOUNTER — HEALTH MAINTENANCE LETTER (OUTPATIENT)
Age: 66
End: 2021-03-20

## 2021-04-08 ENCOUNTER — OFFICE VISIT (OUTPATIENT)
Dept: INTERNAL MEDICINE | Facility: CLINIC | Age: 66
End: 2021-04-08
Payer: COMMERCIAL

## 2021-04-08 VITALS
HEIGHT: 63 IN | RESPIRATION RATE: 18 BRPM | SYSTOLIC BLOOD PRESSURE: 119 MMHG | OXYGEN SATURATION: 100 % | DIASTOLIC BLOOD PRESSURE: 77 MMHG | TEMPERATURE: 98.2 F | HEART RATE: 71 BPM | BODY MASS INDEX: 23.97 KG/M2 | WEIGHT: 135.3 LBS

## 2021-04-08 DIAGNOSIS — M81.0 OSTEOPOROSIS WITHOUT CURRENT PATHOLOGICAL FRACTURE, UNSPECIFIED OSTEOPOROSIS TYPE: ICD-10-CM

## 2021-04-08 DIAGNOSIS — Z00.00 ENCOUNTER FOR ROUTINE ADULT HEALTH EXAMINATION WITHOUT ABNORMAL FINDINGS: Primary | ICD-10-CM

## 2021-04-08 DIAGNOSIS — I10 BENIGN ESSENTIAL HYPERTENSION: ICD-10-CM

## 2021-04-08 DIAGNOSIS — E03.9 ACQUIRED HYPOTHYROIDISM: ICD-10-CM

## 2021-04-08 DIAGNOSIS — Z13.6 CARDIOVASCULAR SCREENING; LDL GOAL LESS THAN 130: ICD-10-CM

## 2021-04-08 LAB
ANION GAP SERPL CALCULATED.3IONS-SCNC: <1 MMOL/L (ref 3–14)
BUN SERPL-MCNC: 23 MG/DL (ref 7–30)
CALCIUM SERPL-MCNC: 9 MG/DL (ref 8.5–10.1)
CHLORIDE SERPL-SCNC: 109 MMOL/L (ref 94–109)
CHOLEST SERPL-MCNC: 208 MG/DL
CO2 SERPL-SCNC: 33 MMOL/L (ref 20–32)
CREAT SERPL-MCNC: 0.75 MG/DL (ref 0.52–1.04)
GFR SERPL CREATININE-BSD FRML MDRD: 84 ML/MIN/{1.73_M2}
GLUCOSE SERPL-MCNC: 84 MG/DL (ref 70–99)
HDLC SERPL-MCNC: 64 MG/DL
LDLC SERPL CALC-MCNC: 131 MG/DL
NONHDLC SERPL-MCNC: 144 MG/DL
POTASSIUM SERPL-SCNC: 4.3 MMOL/L (ref 3.4–5.3)
SODIUM SERPL-SCNC: 141 MMOL/L (ref 133–144)
TRIGL SERPL-MCNC: 67 MG/DL
TSH SERPL DL<=0.005 MIU/L-ACNC: 0.91 MU/L (ref 0.4–4)

## 2021-04-08 PROCEDURE — 84443 ASSAY THYROID STIM HORMONE: CPT | Performed by: INTERNAL MEDICINE

## 2021-04-08 PROCEDURE — 80061 LIPID PANEL: CPT | Performed by: INTERNAL MEDICINE

## 2021-04-08 PROCEDURE — 80048 BASIC METABOLIC PNL TOTAL CA: CPT | Performed by: INTERNAL MEDICINE

## 2021-04-08 PROCEDURE — 99396 PREV VISIT EST AGE 40-64: CPT | Performed by: INTERNAL MEDICINE

## 2021-04-08 PROCEDURE — 82043 UR ALBUMIN QUANTITATIVE: CPT | Performed by: INTERNAL MEDICINE

## 2021-04-08 PROCEDURE — 36415 COLL VENOUS BLD VENIPUNCTURE: CPT | Performed by: INTERNAL MEDICINE

## 2021-04-08 PROCEDURE — 99214 OFFICE O/P EST MOD 30 MIN: CPT | Mod: 25 | Performed by: INTERNAL MEDICINE

## 2021-04-08 RX ORDER — LISINOPRIL 10 MG/1
10 TABLET ORAL DAILY
Qty: 90 TABLET | Refills: 3 | Status: SHIPPED | OUTPATIENT
Start: 2021-04-08 | End: 2022-06-09

## 2021-04-08 RX ORDER — LEVOTHYROXINE SODIUM 100 UG/1
100 TABLET ORAL DAILY
Qty: 90 TABLET | Refills: 3 | Status: SHIPPED | OUTPATIENT
Start: 2021-04-08 | End: 2022-06-15

## 2021-04-08 RX ORDER — CYCLOBENZAPRINE HCL 10 MG
10 TABLET ORAL 3 TIMES DAILY PRN
Qty: 90 TABLET | Refills: 3 | Status: SHIPPED | OUTPATIENT
Start: 2021-04-08 | End: 2022-08-25

## 2021-04-08 ASSESSMENT — ENCOUNTER SYMPTOMS
WEAKNESS: 0
HEADACHES: 0
DIARRHEA: 0
NAUSEA: 0
PARESTHESIAS: 0
EYE PAIN: 0
HEMATURIA: 0
NERVOUS/ANXIOUS: 0
JOINT SWELLING: 0
CONSTIPATION: 0
SHORTNESS OF BREATH: 0
DIZZINESS: 0
FREQUENCY: 0
HEMATOCHEZIA: 0
COUGH: 0
SORE THROAT: 0
DYSURIA: 0
CHILLS: 0
PALPITATIONS: 0
HEARTBURN: 0
BREAST MASS: 0
ABDOMINAL PAIN: 0
FEVER: 0
MYALGIAS: 0
ARTHRALGIAS: 0

## 2021-04-08 ASSESSMENT — MIFFLIN-ST. JEOR: SCORE: 1132.85

## 2021-04-08 NOTE — PROGRESS NOTES
{PROVIDER CHARTING PREFERENCE:714868}    Subjective   Le is a 65 year old who presents for the following health issues {ACCOMPANIED BY STATEMENT (Optional):961643}    HPI     {SUPERLIST (Optional):102873}  {additonal problems for provider to add (Optional):216107}    Review of Systems   {ROS COMP (Optional):216837}      Objective    LMP 03/01/2004   There is no height or weight on file to calculate BMI.  Physical Exam   {Exam List (Optional):214403}    {Diagnostic Test Results (Optional):092736}    {AMBULATORY ATTESTATION (Optional):341392}

## 2021-04-08 NOTE — PROGRESS NOTES
SUBJECTIVE:   CC: Le Vora is an 65 year old woman who presents for preventive health visit.       Patient has been advised of split billing requirements and indicates understanding: Yes  Healthy Habits:     Getting at least 3 servings of Calcium per day:  Yes    Bi-annual eye exam:  Yes    Dental care twice a year:  Yes    Sleep apnea or symptoms of sleep apnea:  None    Diet:  Regular (no restrictions) and Low fat/cholesterol    Frequency of exercise:  2-3 days/week    Duration of exercise:  15-30 minutes    Taking medications regularly:  Yes    Medication side effects:  None    PHQ-2 Total Score: 0    Additional concerns today:  No    Ability to successfully perform activities of daily living: Yes, no assistance needed  Home safety:  none identified   Hearing impairment: None    Problems:   1. HTN: not checking BP at home, no concerns about medication  2. Hypothyroidism: stable clinically  3. Osteoporosis: on Evista, had not been taking regularly lately    Patient Active Problem List   Diagnosis     Advanced directives, counseling/discussion     Osteoporosis     Acquired hypothyroidism     Benign essential hypertension     CARDIOVASCULAR SCREENING; LDL GOAL LESS THAN 130     Current Outpatient Medications   Medication Sig Dispense Refill     Acetaminophen (TYLENOL PO) Take 325 mg by mouth       calcium carbonate-vitamin D (CALTRATE 600+D) 600-400 MG-UNIT CHEW Take 1 chew tab by mouth daily. 180 tablet 3     cyclobenzaprine (FLEXERIL) 10 MG tablet Take 1 tablet (10 mg) by mouth 3 times daily as needed for muscle spasms 90 tablet 0     Ibuprofen (ADVIL PO) Take 200 mg by mouth       levothyroxine (SYNTHROID/LEVOTHROID) 100 MCG tablet TAKE ONE TABLET BY MOUTH ONCE DAILY 90 tablet 1     lisinopril (ZESTRIL) 10 MG tablet Take 1 tablet (10 mg) by mouth daily 90 tablet 1     omeprazole (PRILOSEC) 20 MG DR capsule Take 1 capsule (20 mg) by mouth daily as needed       raloxifene (EVISTA) 60 MG tablet Take 1  tablet (60 mg) by mouth daily 90 tablet 3     Cetirizine HCl (ZYRTEC ALLERGY PO) Take by mouth as needed       conjugated estrogens (PREMARIN) 0.625 MG/GM vaginal cream Place 1 g vaginally twice a week (Patient not taking: Reported on 4/8/2021) 42.5 g 12     VITAMIN D, CHOLECALCIFEROL, PO Take 1,000 Units by mouth daily          Today's PHQ-2 Score:   PHQ-2 ( 1999 Pfizer) 4/8/2021   Q1: Little interest or pleasure in doing things 0   Q2: Feeling down, depressed or hopeless 0   PHQ-2 Score 0   Q1: Little interest or pleasure in doing things Not at all   Q2: Feeling down, depressed or hopeless Not at all   PHQ-2 Score 0       Abuse: Current or Past (Physical, Sexual or Emotional) - No  Do you feel safe in your environment? Yes        Social History     Tobacco Use     Smoking status: Never Smoker     Smokeless tobacco: Never Used   Substance Use Topics     Alcohol use: No     If you drink alcohol do you typically have >3 drinks per day or >7 drinks per week? No    Alcohol Use 4/8/2021   Prescreen: >3 drinks/day or >7 drinks/week? Not Applicable   Prescreen: >3 drinks/day or >7 drinks/week? -   No flowsheet data found.    Reviewed orders with patient.  Reviewed health maintenance and updated orders accordingly - Yes      Breast Cancer Screening:  Any new diagnosis of family breast, ovarian, or bowel cancer? No    FSH-7: No flowsheet data found.    Mammogram Screening: Recommended mammography every 1-2 years with patient discussion and risk factor consideration  Pertinent mammograms are reviewed under the imaging tab.    History of abnormal Pap smear: NO - age 30-65 PAP every 5 years with negative HPV co-testing recommended  PAP / HPV Latest Ref Rng & Units 9/8/2016 5/30/2013 3/1/2010   PAP - NIL NIL NIL   HPV 16 DNA NEG Negative - -   HPV 18 DNA NEG Negative - -   OTHER HR HPV NEG Negative - -     Reviewed and updated as needed this visit by clinical staff   Allergies               Reviewed and updated as needed this  "visit by Provider                    Review of Systems   Constitutional: Negative for chills and fever.   HENT: Negative for congestion, ear pain, hearing loss and sore throat.    Eyes: Negative for pain and visual disturbance.   Respiratory: Negative for cough and shortness of breath.    Cardiovascular: Negative for chest pain, palpitations and peripheral edema.   Gastrointestinal: Negative for abdominal pain, constipation, diarrhea, heartburn, hematochezia and nausea.   Breasts:  Negative for tenderness, breast mass and discharge.   Genitourinary: Negative for dysuria, frequency, genital sores, hematuria, pelvic pain, urgency, vaginal bleeding and vaginal discharge.   Musculoskeletal: Negative for arthralgias, joint swelling and myalgias.   Skin: Negative for rash.   Neurological: Negative for dizziness, weakness, headaches and paresthesias.   Psychiatric/Behavioral: Negative for mood changes. The patient is not nervous/anxious.           OBJECTIVE:   /77 (BP Location: Left arm, Patient Position: Sitting, Cuff Size: Adult Regular)   Pulse 71   Temp 98.2  F (36.8  C) (Oral)   Resp 18   Ht 1.6 m (5' 3\")   Wt 61.4 kg (135 lb 4.8 oz)   LMP 03/01/2004   SpO2 100%   BMI 23.97 kg/m    Physical Exam      HEENT: extraocular movements are intact, pupils equal and reactive to light and accommodation, TMs clear  NECK: Neck supple. No adenopathy. Thyroid symmetric, normal size,, Carotids without bruits.  PULMONARY: clear to auscultation  CARDIAC: regular rate and rhythm and no murmurs, clicks, or gallops  PULSES: 2/2 throughout  BACK: no spinal or CVAT  ABDOMINAL: Soft, nontender.  Normal bowel sounds.  No hepatosplenomegaly or abnormal masses  BREAST: No breast masses or tenderness, No axillary masses or tenderness and No galactorrhea  REFLEXES: 2+ throughout        ASSESSMENT/PLAN:   1. Encounter for routine adult health examination without abnormal findings  Advised about vaccines    2. Benign essential " "hypertension  Well controlled, continue med  - lisinopril (ZESTRIL) 10 MG tablet; Take 1 tablet (10 mg) by mouth daily  Dispense: 90 tablet; Refill: 3  - Basic metabolic panel  (Ca, Cl, CO2, Creat, Gluc, K, Na, BUN)  - Albumin Random Urine Quantitative with Creat Ratio  - OFFICE/OUTPT VISIT,EST,LEVL IV    3. Acquired hypothyroidism  Stable clinically, recheck levels   - TSH with free T4 reflex  - OFFICE/OUTPT VISIT,EST,LEVL IV    4. Osteoporosis without current pathological fracture, unspecified osteoporosis type  Strongly encouraged to use medication regularly due to low bone density, recheck in a 2 years  - OFFICE/OUTPT VISIT,EST,LEVL IV    5. CARDIOVASCULAR SCREENING; LDL GOAL LESS THAN 130  recheck  - Lipid panel reflex to direct LDL Fasting    Patient has been advised of split billing requirements and indicates understanding: Yes  COUNSELING:  Reviewed preventive health counseling, as reflected in patient instructions    Estimated body mass index is 22.49 kg/m  as calculated from the following:    Height as of 10/8/20: 1.626 m (5' 4\").    Weight as of 10/8/20: 59.4 kg (131 lb).        She reports that she has never smoked. She has never used smokeless tobacco.      Counseling Resources:  ATP IV Guidelines  Pooled Cohorts Equation Calculator  Breast Cancer Risk Calculator  BRCA-Related Cancer Risk Assessment: FHS-7 Tool  FRAX Risk Assessment  ICSI Preventive Guidelines  Dietary Guidelines for Americans, 2010  USDA's MyPlate  ASA Prophylaxis  Lung CA Screening    Brii Sherwood MD  [facility]  "

## 2021-04-08 NOTE — NURSING NOTE
"/77 (BP Location: Left arm, Patient Position: Sitting, Cuff Size: Adult Regular)   Pulse 71   Temp 98.2  F (36.8  C) (Oral)   Resp 18   Ht 1.6 m (5' 3\")   Wt 61.4 kg (135 lb 4.8 oz)   LMP 03/01/2004   SpO2 100%   BMI 23.97 kg/m      "

## 2021-04-09 ENCOUNTER — MYC MEDICAL ADVICE (OUTPATIENT)
Dept: INTERNAL MEDICINE | Facility: CLINIC | Age: 66
End: 2021-04-09

## 2021-04-09 LAB
CREAT UR-MCNC: 135 MG/DL
MICROALBUMIN UR-MCNC: 12 MG/L
MICROALBUMIN/CREAT UR: 9.11 MG/G CR (ref 0–25)

## 2021-04-09 NOTE — TELEPHONE ENCOUNTER
Please see message below and advise.    TSH   Date Value Ref Range Status   04/08/2021 0.91 0.40 - 4.00 mU/L Final

## 2021-08-12 ENCOUNTER — MYC MEDICAL ADVICE (OUTPATIENT)
Dept: INTERNAL MEDICINE | Facility: CLINIC | Age: 66
End: 2021-08-12

## 2021-08-12 RX ORDER — MECOBALAMIN 5000 MCG
15 TABLET,DISINTEGRATING ORAL DAILY
Qty: 90 CAPSULE | Status: CANCELLED | OUTPATIENT
Start: 2021-08-12

## 2021-08-12 NOTE — TELEPHONE ENCOUNTER
Please see message below and advise. Patient is requesting lansoprazole 15 mg.    Routing refill request to provider for review/approval because:  Drug not active on patient's medication list

## 2021-09-04 ENCOUNTER — HEALTH MAINTENANCE LETTER (OUTPATIENT)
Age: 66
End: 2021-09-04

## 2021-11-10 DIAGNOSIS — M81.0 OSTEOPOROSIS WITHOUT CURRENT PATHOLOGICAL FRACTURE, UNSPECIFIED OSTEOPOROSIS TYPE: ICD-10-CM

## 2021-11-11 RX ORDER — RALOXIFENE HYDROCHLORIDE 60 MG/1
60 TABLET, FILM COATED ORAL DAILY
Qty: 90 TABLET | Refills: 1 | Status: SHIPPED | OUTPATIENT
Start: 2021-11-11 | End: 2022-01-19 | Stop reason: ALTCHOICE

## 2021-11-11 NOTE — TELEPHONE ENCOUNTER
Pending Prescriptions:                       Disp   Refills    raloxifene (EVISTA) 60 MG tablet [Pharmac*90 tab*1            Sig: TAKE 1 TABLET (60 MG) BY MOUTH DAILY      Prescription approved per [employer] Refill Protocol.    Barbara RODRIGUEZ RN   [facility]

## 2021-12-03 ENCOUNTER — MYC MEDICAL ADVICE (OUTPATIENT)
Dept: INTERNAL MEDICINE | Facility: CLINIC | Age: 66
End: 2021-12-03
Payer: COMMERCIAL

## 2021-12-03 DIAGNOSIS — M81.0 OSTEOPOROSIS WITHOUT CURRENT PATHOLOGICAL FRACTURE, UNSPECIFIED OSTEOPOROSIS TYPE: ICD-10-CM

## 2021-12-03 DIAGNOSIS — T50.905A ADVERSE EFFECT OF DRUG, INITIAL ENCOUNTER: Primary | ICD-10-CM

## 2021-12-07 NOTE — TELEPHONE ENCOUNTER
See her [employer] message. Prolia would have to go through the prolia PA department to see how much she would pay out of pocket.     Please order through smart set if OK.

## 2021-12-17 PROBLEM — T50.905A ADVERSE REACTION TO DRUG: Status: ACTIVE | Noted: 2021-12-17

## 2021-12-17 RX ORDER — EPINEPHRINE 1 MG/ML
0.3 INJECTION, SOLUTION, CONCENTRATE INTRAVENOUS EVERY 5 MIN PRN
Status: CANCELLED | OUTPATIENT
Start: 2021-12-17

## 2021-12-17 RX ORDER — DIPHENHYDRAMINE HYDROCHLORIDE 50 MG/ML
50 INJECTION INTRAMUSCULAR; INTRAVENOUS
Status: CANCELLED
Start: 2021-12-17

## 2021-12-17 RX ORDER — ALBUTEROL SULFATE 0.83 MG/ML
2.5 SOLUTION RESPIRATORY (INHALATION)
Status: CANCELLED | OUTPATIENT
Start: 2021-12-17

## 2021-12-17 RX ORDER — MEPERIDINE HYDROCHLORIDE 25 MG/ML
25 INJECTION INTRAMUSCULAR; INTRAVENOUS; SUBCUTANEOUS EVERY 30 MIN PRN
Status: CANCELLED | OUTPATIENT
Start: 2021-12-17

## 2021-12-17 RX ORDER — METHYLPREDNISOLONE SODIUM SUCCINATE 125 MG/2ML
125 INJECTION, POWDER, LYOPHILIZED, FOR SOLUTION INTRAMUSCULAR; INTRAVENOUS
Status: CANCELLED
Start: 2021-12-17

## 2021-12-17 RX ORDER — NALOXONE HYDROCHLORIDE 0.4 MG/ML
0.2 INJECTION, SOLUTION INTRAMUSCULAR; INTRAVENOUS; SUBCUTANEOUS
Status: CANCELLED | OUTPATIENT
Start: 2021-12-17

## 2021-12-17 RX ORDER — ALBUTEROL SULFATE 90 UG/1
1-2 AEROSOL, METERED RESPIRATORY (INHALATION)
Status: CANCELLED
Start: 2021-12-17

## 2021-12-17 NOTE — TELEPHONE ENCOUNTER
Plan placed and signed.   She had significant issues with Fosamax getting stuck when swallowing and irritating her esophagus so we do not wish to use any other bisphosphonate.  She is having bothersome hot flashes with raloxifene.

## 2021-12-23 NOTE — TELEPHONE ENCOUNTER
Called [phone] and spoke with [facility] . They state Prolia was entered as an infusion therapy referral and they submitted the auth, but that everything will need to be re-secured after the 1st of the year.       12/22/2021 12:30:28 PM Telephone (Outgoing)    [phone] Brandy Flores / 01.19.2022 / FIORDALIZA Called and spoke to pt about P1 SOC / Pt is changing to medicare/medicare advantage plan as 1/1/22 - pt does not have part B yet and is reviewing plans / she will call shared line when she has insurance company and ID number           Appointments in Next Year    Jan 19, 2022 11:00 AM  Infusion Visit with  FAST TRACK PLUS 2  [facility] ([facility] ) [phone]        Barbara RODRIGUEZ RN   [facility]

## 2022-01-19 ENCOUNTER — INFUSION THERAPY VISIT (OUTPATIENT)
Dept: INFUSION THERAPY | Facility: CLINIC | Age: 67
End: 2022-01-19
Attending: INTERNAL MEDICINE
Payer: COMMERCIAL

## 2022-01-19 VITALS
TEMPERATURE: 97.7 F | WEIGHT: 137.7 LBS | DIASTOLIC BLOOD PRESSURE: 71 MMHG | BODY MASS INDEX: 24.39 KG/M2 | SYSTOLIC BLOOD PRESSURE: 123 MMHG | OXYGEN SATURATION: 100 % | HEART RATE: 71 BPM | RESPIRATION RATE: 16 BRPM

## 2022-01-19 DIAGNOSIS — T50.905A ADVERSE EFFECT OF DRUG, INITIAL ENCOUNTER: ICD-10-CM

## 2022-01-19 DIAGNOSIS — M81.0 OSTEOPOROSIS WITHOUT CURRENT PATHOLOGICAL FRACTURE, UNSPECIFIED OSTEOPOROSIS TYPE: Primary | ICD-10-CM

## 2022-01-19 LAB
CALCIUM SERPL-MCNC: 8.9 MG/DL (ref 8.5–10.1)
CREAT SERPL-MCNC: 0.71 MG/DL (ref 0.52–1.04)
GFR SERPL CREATININE-BSD FRML MDRD: >90 ML/MIN/1.73M2

## 2022-01-19 PROCEDURE — 96372 THER/PROPH/DIAG INJ SC/IM: CPT | Performed by: INTERNAL MEDICINE

## 2022-01-19 PROCEDURE — 82565 ASSAY OF CREATININE: CPT | Performed by: INTERNAL MEDICINE

## 2022-01-19 PROCEDURE — 250N000011 HC RX IP 250 OP 636: Performed by: INTERNAL MEDICINE

## 2022-01-19 PROCEDURE — 36415 COLL VENOUS BLD VENIPUNCTURE: CPT | Performed by: INTERNAL MEDICINE

## 2022-01-19 PROCEDURE — 82310 ASSAY OF CALCIUM: CPT | Performed by: INTERNAL MEDICINE

## 2022-01-19 RX ORDER — EPINEPHRINE 1 MG/ML
0.3 INJECTION, SOLUTION INTRAMUSCULAR; SUBCUTANEOUS EVERY 5 MIN PRN
Status: CANCELLED | OUTPATIENT
Start: 2022-07-18

## 2022-01-19 RX ORDER — MEPERIDINE HYDROCHLORIDE 25 MG/ML
25 INJECTION INTRAMUSCULAR; INTRAVENOUS; SUBCUTANEOUS EVERY 30 MIN PRN
Status: CANCELLED | OUTPATIENT
Start: 2022-07-18

## 2022-01-19 RX ORDER — METHYLPREDNISOLONE SODIUM SUCCINATE 125 MG/2ML
125 INJECTION, POWDER, LYOPHILIZED, FOR SOLUTION INTRAMUSCULAR; INTRAVENOUS
Status: CANCELLED
Start: 2022-07-18

## 2022-01-19 RX ORDER — ALBUTEROL SULFATE 90 UG/1
1-2 AEROSOL, METERED RESPIRATORY (INHALATION)
Status: CANCELLED
Start: 2022-07-18

## 2022-01-19 RX ORDER — DIPHENHYDRAMINE HYDROCHLORIDE 50 MG/ML
50 INJECTION INTRAMUSCULAR; INTRAVENOUS
Status: CANCELLED
Start: 2022-07-18

## 2022-01-19 RX ORDER — ALBUTEROL SULFATE 0.83 MG/ML
2.5 SOLUTION RESPIRATORY (INHALATION)
Status: CANCELLED | OUTPATIENT
Start: 2022-07-18

## 2022-01-19 RX ORDER — NALOXONE HYDROCHLORIDE 0.4 MG/ML
0.2 INJECTION, SOLUTION INTRAMUSCULAR; INTRAVENOUS; SUBCUTANEOUS
Status: CANCELLED | OUTPATIENT
Start: 2022-07-18

## 2022-01-19 RX ADMIN — DENOSUMAB 60 MG: 60 INJECTION SUBCUTANEOUS at 11:45

## 2022-01-19 ASSESSMENT — PAIN SCALES - GENERAL: PAINLEVEL: NO PAIN (0)

## 2022-01-19 NOTE — PROGRESS NOTES
Infusion Nursing Note:  Le Vora presents today for labs and Prolia.    Patient seen by provider today: No   present during visit today: Not Applicable.    Note: Drug information given to and reviewed with patient.  Confirmed she is taking a Calcium with Vitamin D.  Denies recent or upcoming major dental work.      Intravenous Access:  Lab draw site RAC, Needle type butterfly, Gauge 23.  Labs drawn without difficulty.    Treatment Conditions:  Results reviewed, labs MET treatment parameters, ok to proceed with treatment.      Post Infusion Assessment:  Patient tolerated injection without incident.       Discharge Plan:   Discharge instructions reviewed with: Patient.  Patient discharged in stable condition accompanied by: self.  Departure Mode: Ambulatory.  Will call to schedule appointment in 6 months.    TAMIA MUNIZ RN

## 2022-02-19 ENCOUNTER — HEALTH MAINTENANCE LETTER (OUTPATIENT)
Age: 67
End: 2022-02-19

## 2022-06-05 ENCOUNTER — HEALTH MAINTENANCE LETTER (OUTPATIENT)
Age: 67
End: 2022-06-05

## 2022-07-20 ENCOUNTER — ALLIED HEALTH/NURSE VISIT (OUTPATIENT)
Dept: INFUSION THERAPY | Facility: CLINIC | Age: 67
End: 2022-07-20
Attending: INTERNAL MEDICINE
Payer: COMMERCIAL

## 2022-07-20 VITALS
TEMPERATURE: 98.6 F | SYSTOLIC BLOOD PRESSURE: 142 MMHG | HEART RATE: 68 BPM | OXYGEN SATURATION: 98 % | DIASTOLIC BLOOD PRESSURE: 86 MMHG

## 2022-07-20 DIAGNOSIS — M81.0 OSTEOPOROSIS WITHOUT CURRENT PATHOLOGICAL FRACTURE, UNSPECIFIED OSTEOPOROSIS TYPE: Primary | ICD-10-CM

## 2022-07-20 DIAGNOSIS — T50.905A ADVERSE EFFECT OF DRUG, INITIAL ENCOUNTER: ICD-10-CM

## 2022-07-20 LAB
ALBUMIN SERPL-MCNC: 3.6 G/DL (ref 3.4–5)
CALCIUM SERPL-MCNC: 8.9 MG/DL (ref 8.5–10.1)
CREAT SERPL-MCNC: 0.68 MG/DL (ref 0.52–1.04)
GFR SERPL CREATININE-BSD FRML MDRD: >90 ML/MIN/1.73M2

## 2022-07-20 PROCEDURE — 82310 ASSAY OF CALCIUM: CPT | Performed by: INTERNAL MEDICINE

## 2022-07-20 PROCEDURE — 82040 ASSAY OF SERUM ALBUMIN: CPT | Performed by: INTERNAL MEDICINE

## 2022-07-20 PROCEDURE — 82565 ASSAY OF CREATININE: CPT | Performed by: INTERNAL MEDICINE

## 2022-07-20 PROCEDURE — 96372 THER/PROPH/DIAG INJ SC/IM: CPT | Performed by: INTERNAL MEDICINE

## 2022-07-20 PROCEDURE — 250N000011 HC RX IP 250 OP 636: Performed by: INTERNAL MEDICINE

## 2022-07-20 PROCEDURE — 36415 COLL VENOUS BLD VENIPUNCTURE: CPT | Performed by: INTERNAL MEDICINE

## 2022-07-20 RX ORDER — DIPHENHYDRAMINE HYDROCHLORIDE 50 MG/ML
50 INJECTION INTRAMUSCULAR; INTRAVENOUS
Status: CANCELLED
Start: 2023-01-14

## 2022-07-20 RX ORDER — MEPERIDINE HYDROCHLORIDE 25 MG/ML
25 INJECTION INTRAMUSCULAR; INTRAVENOUS; SUBCUTANEOUS EVERY 30 MIN PRN
Status: CANCELLED | OUTPATIENT
Start: 2023-01-14

## 2022-07-20 RX ORDER — ALBUTEROL SULFATE 0.83 MG/ML
2.5 SOLUTION RESPIRATORY (INHALATION)
Status: CANCELLED | OUTPATIENT
Start: 2023-01-14

## 2022-07-20 RX ORDER — EPINEPHRINE 1 MG/ML
0.3 INJECTION, SOLUTION INTRAMUSCULAR; SUBCUTANEOUS EVERY 5 MIN PRN
Status: CANCELLED | OUTPATIENT
Start: 2023-01-14

## 2022-07-20 RX ORDER — NALOXONE HYDROCHLORIDE 0.4 MG/ML
0.2 INJECTION, SOLUTION INTRAMUSCULAR; INTRAVENOUS; SUBCUTANEOUS
Status: CANCELLED | OUTPATIENT
Start: 2023-01-14

## 2022-07-20 RX ORDER — METHYLPREDNISOLONE SODIUM SUCCINATE 125 MG/2ML
125 INJECTION, POWDER, LYOPHILIZED, FOR SOLUTION INTRAMUSCULAR; INTRAVENOUS
Status: CANCELLED
Start: 2023-01-14

## 2022-07-20 RX ORDER — ALBUTEROL SULFATE 90 UG/1
1-2 AEROSOL, METERED RESPIRATORY (INHALATION)
Status: CANCELLED
Start: 2023-01-14

## 2022-07-20 RX ADMIN — DENOSUMAB 60 MG: 60 INJECTION SUBCUTANEOUS at 11:23

## 2022-07-20 NOTE — PROGRESS NOTES
Infusion Nursing Note:  Le Vora presents today for labs/Prolia.    Patient seen by provider today: No   present during visit today: Not Applicable.    Note: Pt verifies she is taking Calcium/Vit D as prescribed.  No upcoming dental procedures.    Intravenous Access:  Labs drawn without difficulty.    Treatment Conditions:  Results reviewed, labs MET treatment parameters, ok to proceed with treatment.  Creat 0.68  Calcium 8.9    Post Infusion Assessment:  Patient tolerated injection without incident.  Site patent and intact, free from redness, edema or discomfort.     Discharge Plan:   AVS to patient via MYCHART.  Patient will return in 6 months for next Prolia injection.  She will schedule this appt at her earliest convenience for next appointment.   Patient discharged in stable condition accompanied by: self.  Departure Mode: Ambulatory.      Ly Maya RN

## 2022-08-17 ENCOUNTER — MYC MEDICAL ADVICE (OUTPATIENT)
Dept: INTERNAL MEDICINE | Facility: CLINIC | Age: 67
End: 2022-08-17

## 2022-08-18 NOTE — TELEPHONE ENCOUNTER
Please review [employer] message and advise.   Appointments in Next Year    Aug 25, 2022 10:00 AM  (Arrive by 9:40 AM)  WELCOME TO MEDICARE VISIT with Brii Sherwood MD  [facility] ([facility] ) [phone]           Darcy Bhatia RN  [facility]

## 2022-08-25 ENCOUNTER — OFFICE VISIT (OUTPATIENT)
Dept: INTERNAL MEDICINE | Facility: CLINIC | Age: 67
End: 2022-08-25
Payer: COMMERCIAL

## 2022-08-25 ENCOUNTER — TELEPHONE (OUTPATIENT)
Dept: INTERNAL MEDICINE | Facility: CLINIC | Age: 67
End: 2022-08-25

## 2022-08-25 VITALS
WEIGHT: 137 LBS | HEIGHT: 63 IN | DIASTOLIC BLOOD PRESSURE: 68 MMHG | HEART RATE: 82 BPM | BODY MASS INDEX: 24.27 KG/M2 | OXYGEN SATURATION: 100 % | TEMPERATURE: 97.6 F | SYSTOLIC BLOOD PRESSURE: 138 MMHG | RESPIRATION RATE: 16 BRPM

## 2022-08-25 DIAGNOSIS — Z00.00 WELCOME TO MEDICARE PREVENTIVE VISIT: Primary | ICD-10-CM

## 2022-08-25 DIAGNOSIS — E03.9 ACQUIRED HYPOTHYROIDISM: ICD-10-CM

## 2022-08-25 DIAGNOSIS — Z12.31 ENCOUNTER FOR SCREENING MAMMOGRAM FOR BREAST CANCER: ICD-10-CM

## 2022-08-25 DIAGNOSIS — Z13.6 CARDIOVASCULAR SCREENING; LDL GOAL LESS THAN 130: ICD-10-CM

## 2022-08-25 DIAGNOSIS — Z23 NEED FOR VACCINATION: ICD-10-CM

## 2022-08-25 DIAGNOSIS — I10 BENIGN ESSENTIAL HYPERTENSION: ICD-10-CM

## 2022-08-25 PROCEDURE — G0009 ADMIN PNEUMOCOCCAL VACCINE: HCPCS | Performed by: INTERNAL MEDICINE

## 2022-08-25 PROCEDURE — 99214 OFFICE O/P EST MOD 30 MIN: CPT | Mod: 25 | Performed by: INTERNAL MEDICINE

## 2022-08-25 PROCEDURE — 84443 ASSAY THYROID STIM HORMONE: CPT | Performed by: INTERNAL MEDICINE

## 2022-08-25 PROCEDURE — G0402 INITIAL PREVENTIVE EXAM: HCPCS | Performed by: INTERNAL MEDICINE

## 2022-08-25 PROCEDURE — 90677 PCV20 VACCINE IM: CPT | Performed by: INTERNAL MEDICINE

## 2022-08-25 PROCEDURE — 82043 UR ALBUMIN QUANTITATIVE: CPT | Performed by: INTERNAL MEDICINE

## 2022-08-25 PROCEDURE — G0403 EKG FOR INITIAL PREVENT EXAM: HCPCS | Performed by: INTERNAL MEDICINE

## 2022-08-25 PROCEDURE — 80061 LIPID PANEL: CPT | Performed by: INTERNAL MEDICINE

## 2022-08-25 PROCEDURE — 80048 BASIC METABOLIC PNL TOTAL CA: CPT | Performed by: INTERNAL MEDICINE

## 2022-08-25 PROCEDURE — 36415 COLL VENOUS BLD VENIPUNCTURE: CPT | Performed by: INTERNAL MEDICINE

## 2022-08-25 RX ORDER — LISINOPRIL 10 MG/1
10 TABLET ORAL 2 TIMES DAILY
Qty: 180 TABLET | Refills: 0 | Status: SHIPPED | OUTPATIENT
Start: 2022-08-25 | End: 2022-12-20

## 2022-08-25 RX ORDER — LEVOTHYROXINE SODIUM 100 UG/1
100 TABLET ORAL DAILY
Qty: 90 TABLET | Refills: 3 | Status: SHIPPED | OUTPATIENT
Start: 2022-08-25 | End: 2023-08-31

## 2022-08-25 ASSESSMENT — ENCOUNTER SYMPTOMS
BREAST MASS: 0
PARESTHESIAS: 0
DIZZINESS: 0
NAUSEA: 0
FREQUENCY: 0
DYSURIA: 0
MYALGIAS: 0
JOINT SWELLING: 0
SORE THROAT: 0
COUGH: 0
HEMATURIA: 0
CONSTIPATION: 0
PALPITATIONS: 0
HEARTBURN: 0
WEAKNESS: 0
HEMATOCHEZIA: 0
CHILLS: 0
EYE PAIN: 0
FEVER: 0
ARTHRALGIAS: 0
SHORTNESS OF BREATH: 0
NERVOUS/ANXIOUS: 0
HEADACHES: 0
DIARRHEA: 0
ABDOMINAL PAIN: 0

## 2022-08-25 ASSESSMENT — ACTIVITIES OF DAILY LIVING (ADL): CURRENT_FUNCTION: NO ASSISTANCE NEEDED

## 2022-08-25 NOTE — PROGRESS NOTES
"SUBJECTIVE:   Le Vora is a 67 year old female who presents for Preventive Visit.      Patient has been advised of split billing requirements and indicates understanding: Yes  Are you in the first 12 months of your Medicare coverage?  Yes,  Visual Acuity:  Right Eye: 20/20   Left Eye: 20/20  Both Eyes: 20/20    Healthy Habits:     In general, how would you rate your overall health?  Excellent    Frequency of exercise:  2-3 days/week    Duration of exercise:  15-30 minutes    Do you usually eat at least 4 servings of fruit and vegetables a day, include whole grains    & fiber and avoid regularly eating high fat or \"junk\" foods?  Yes    Taking medications regularly:  Yes    Medication side effects:  None    Ability to successfully perform activities of daily living:  No assistance needed    Home Safety:  No safety concerns identified    Hearing Impairment:  No hearing concerns    In the past 6 months, have you been bothered by leaking of urine?  No    In general, how would you rate your overall mental or emotional health?  Good      PHQ-2 Total Score: 0    Additional concerns today:  No     Problems:  1.  Hypothyroidism: Stable  2.  Hypertension: She had been checking blood pressures just in the past couple weeks and the readings were high as per her message last week.  She had previously been on 20 mg lisinopril, we decreased it to 10 with lower blood pressures.  She increased it back to 20 last week.  She is seeing some slight improvement at home.  3.  Osteoporosis: She has received 2 injections of Prolia.  She is tolerating it well.    Patient Active Problem List   Diagnosis     Advanced directives, counseling/discussion     Osteoporosis     Acquired hypothyroidism     Benign essential hypertension     CARDIOVASCULAR SCREENING; LDL GOAL LESS THAN 130     Adverse reaction to drug     Current Outpatient Medications   Medication Sig Dispense Refill     calcium carbonate-vitamin D (OS-KENDELL) 600-400 MG-UNIT " chewable tablet Take 1 chew tab by mouth daily. 180 tablet 3     Cetirizine HCl (ZYRTEC ALLERGY PO) Take by mouth as needed       Ibuprofen (ADVIL PO) Take 200 mg by mouth       levothyroxine (SYNTHROID/LEVOTHROID) 100 MCG tablet TAKE 1 TABLET (100 MCG) BY MOUTH DAILY 90 tablet 0     lisinopril (ZESTRIL) 10 MG tablet Take 1 tablet (10 mg) by mouth daily 90 tablet 0     Multiple Vitamins-Minerals (WOMENS MULTIVITAMIN PO) Take by mouth daily              Do you feel safe in your environment? Yes    Have you ever done Advance Care Planning? (For example, a Health Directive, POLST, or a discussion with a medical provider or your loved ones about your wishes): Yes, advance care planning is on file.       Fall risk  Fallen 2 or more times in the past year?: No  Any fall with injury in the past year?: No    Cognitive Screening   1) Repeat 3 items (Leader, Season, Table)    2) Clock draw: NORMAL  3) 3 item recall: Recalls 3 objects  Results: 3 items recalled: COGNITIVE IMPAIRMENT LESS LIKELY    Mini-CogTM Copyright [employer]. Licensed by the author for use in [facility] [employer]; reprinted with permission (isidra@[facility]). All rights reserved.      Do you have sleep apnea, excessive snoring or daytime drowsiness?: no    Reviewed and updated as needed this visit by clinical staff   Tobacco  Allergies  Meds   Med Hx  Surg Hx  Fam Hx  Soc Hx          Reviewed and updated as needed this visit by Provider                   Social History     Tobacco Use     Smoking status: Never Smoker     Smokeless tobacco: Never Used   Substance Use Topics     Alcohol use: No     If you drink alcohol do you typically have >3 drinks per day or >7 drinks per week? No    Alcohol Use 8/25/2022   Prescreen: >3 drinks/day or >7 drinks/week? Not Applicable   Prescreen: >3 drinks/day or >7 drinks/week? -               Current providers sharing in care for this patient include:   Patient Care Team:  Brii Sherwood MD as PCP - Brii Harrison  "MD PALMA as Assigned PCP    The following health maintenance items are reviewed in Epic and correct as of today:  Health Maintenance Due   Topic Date Due     ANNUAL REVIEW OF HM ORDERS  Never done     ZOSTER IMMUNIZATION (1 of 2) Never done     Pneumococcal Vaccine: 65+ Years (1 - PCV) Never done     MAMMO SCREENING  12/12/2021     MEDICARE ANNUAL WELLNESS VISIT  04/08/2022     MICROALBUMIN  04/08/2022     TSH W/FREE T4 REFLEX  04/08/2022     INFLUENZA VACCINE (1) 09/01/2022         Breast CA Risk Assessment (FHS-7) 8/25/2022   Do you have a family history of breast, colon, or ovarian cancer? No / Unknown         Mammogram Screening: Recommended mammography every 1-2 years with patient discussion and risk factor consideration  Pertinent mammograms are reviewed under the imaging tab.    Review of Systems   Constitutional: Negative for chills and fever.   HENT: Negative for congestion, ear pain, hearing loss and sore throat.    Eyes: Negative for pain and visual disturbance.   Respiratory: Negative for cough and shortness of breath.    Cardiovascular: Negative for chest pain, palpitations and peripheral edema.   Gastrointestinal: Negative for abdominal pain, constipation, diarrhea, heartburn, hematochezia and nausea.   Breasts:  Negative for tenderness, breast mass and discharge.   Genitourinary: Negative for dysuria, frequency, genital sores, hematuria, pelvic pain, urgency, vaginal bleeding and vaginal discharge.   Musculoskeletal: Negative for arthralgias, joint swelling and myalgias.   Skin: Negative for rash.   Neurological: Negative for dizziness, weakness, headaches and paresthesias.   Psychiatric/Behavioral: Negative for mood changes. The patient is not nervous/anxious.          OBJECTIVE:   /68   Pulse 82   Temp 97.6  F (36.4  C) (Tympanic)   Resp 16   Ht 1.6 m (5' 3\")   Wt 62.1 kg (137 lb)   LMP 03/01/2004 (Exact Date)   SpO2 100%   Breastfeeding No   BMI 24.27 kg/m   Estimated body mass index is " "24.27 kg/m  as calculated from the following:    Height as of this encounter: 1.6 m (5' 3\").    Weight as of this encounter: 62.1 kg (137 lb).  Physical Exam    HEENT: extraocular movements are intact, pupils equal and reactive to light and accommodation, TMs clear  NECK: Neck supple. No adenopathy. Thyroid symmetric, normal size,, Carotids without bruits.  PULMONARY: clear to auscultation  CARDIAC: regular rate and rhythm and no murmurs, clicks, or gallops  PULSES: 2/2 throughout  BACK: no spinal or CVAT  ABDOMINAL: Soft, nontender.  Normal bowel sounds.  No hepatosplenomegaly or abnormal masses  BREAST: per gyn  PELVIC: per gyn  REFLEXES: 2+ throughout      EKG: NSR, negative T waves, probably normal variant           ASSESSMENT / PLAN:   (Z00.00) Welcome to Medicare preventive visit  (primary encounter diagnosis)  Comment: Advised about shingles vaccine and she will consider this, pneumonia shot done today  Plan: EKG 12-lead complete w/read - Clinics            (I10) Benign essential hypertension  Comment: Improving control, continue lisinopril 20 mg a day.  If  blood pressures get down less than 110 she could go back down to the 10 mg.    Plan: Albumin Random Urine Quantitative with Creat         Ratio, lisinopril (ZESTRIL) 10 MG tablet, Basic        metabolic panel  (Ca, Cl, CO2, Creat, Gluc, K,         Na, BUN)            (E03.9) Acquired hypothyroidism  Comment: Clinically stable, check lab  Plan: TSH WITH FREE T4 REFLEX, levothyroxine         (SYNTHROID/LEVOTHROID) 100 MCG tablet            (Z13.6) CARDIOVASCULAR SCREENING; LDL GOAL LESS THAN 130  Comment:   Plan: Lipid panel reflex to direct LDL Fasting            (Z12.31) Encounter for screening mammogram for breast cancer  Comment:   Plan: MA SCREENING DIGITAL BILAT - Future  (s+30)            (Z23) Need for vaccination  Comment:   Plan: Pneumococcal 20 Valent Conjugate (Prevnar 20)              Patient has been advised of split billing requirements and " "indicates understanding: Yes    COUNSELING:  Reviewed preventive health counseling, as reflected in patient instructions    Estimated body mass index is 24.27 kg/m  as calculated from the following:    Height as of this encounter: 1.6 m (5' 3\").    Weight as of this encounter: 62.1 kg (137 lb).        She reports that she has never smoked. She has never used smokeless tobacco.      Appropriate preventive services were discussed with this patient, including applicable screening as appropriate for cardiovascular disease, diabetes, osteopenia/osteoporosis, and glaucoma.  As appropriate for age/gender, discussed screening for colorectal cancer, prostate cancer, breast cancer, and cervical cancer. Checklist reviewing preventive services available has been given to the patient.    Reviewed patients plan of care and provided an AVS. The Basic Care Plan (routine screening as documented in Health Maintenance) for Le meets the Care Plan requirement. This Care Plan has been established and reviewed with the Patient.    Counseling Resources:  ATP IV Guidelines  Pooled Cohorts Equation Calculator  Breast Cancer Risk Calculator  Breast Cancer: Medication to Reduce Risk  FRAX Risk Assessment  ICSI Preventive Guidelines  Dietary Guidelines for Americans, 2010  [employer]'s MyPlate  ASA Prophylaxis  Lung CA Screening    Brii Sherwood MD  [facility]    Identified Health Risks:  "

## 2022-08-26 ENCOUNTER — ALLIED HEALTH/NURSE VISIT (OUTPATIENT)
Dept: INTERNAL MEDICINE | Facility: CLINIC | Age: 67
End: 2022-08-26
Payer: COMMERCIAL

## 2022-08-26 DIAGNOSIS — Z00.00 WELCOME TO MEDICARE PREVENTIVE VISIT: Primary | ICD-10-CM

## 2022-08-26 LAB
ANION GAP SERPL CALCULATED.3IONS-SCNC: 3 MMOL/L (ref 3–14)
BUN SERPL-MCNC: 27 MG/DL (ref 7–30)
CALCIUM SERPL-MCNC: 8.8 MG/DL (ref 8.5–10.1)
CHLORIDE BLD-SCNC: 107 MMOL/L (ref 94–109)
CHOLEST SERPL-MCNC: 230 MG/DL
CO2 SERPL-SCNC: 28 MMOL/L (ref 20–32)
CREAT SERPL-MCNC: 0.78 MG/DL (ref 0.52–1.04)
CREAT UR-MCNC: 140 MG/DL
FASTING STATUS PATIENT QL REPORTED: YES
GFR SERPL CREATININE-BSD FRML MDRD: 83 ML/MIN/1.73M2
GLUCOSE BLD-MCNC: 93 MG/DL (ref 70–99)
HDLC SERPL-MCNC: 62 MG/DL
LDLC SERPL CALC-MCNC: 156 MG/DL
MICROALBUMIN UR-MCNC: 12 MG/L
MICROALBUMIN/CREAT UR: 8.57 MG/G CR (ref 0–25)
NONHDLC SERPL-MCNC: 168 MG/DL
POTASSIUM BLD-SCNC: 4.8 MMOL/L (ref 3.4–5.3)
SODIUM SERPL-SCNC: 138 MMOL/L (ref 133–144)
TRIGL SERPL-MCNC: 62 MG/DL
TSH SERPL DL<=0.005 MIU/L-ACNC: 0.64 MU/L (ref 0.4–4)

## 2022-08-26 PROCEDURE — 99207 PR NO CHARGE NURSE ONLY: CPT

## 2022-09-09 ENCOUNTER — HOSPITAL ENCOUNTER (OUTPATIENT)
Dept: MAMMOGRAPHY | Facility: CLINIC | Age: 67
Discharge: HOME | End: 2022-09-09
Attending: INTERNAL MEDICINE | Admitting: INTERNAL MEDICINE
Payer: COMMERCIAL

## 2022-09-09 DIAGNOSIS — Z12.31 ENCOUNTER FOR SCREENING MAMMOGRAM FOR BREAST CANCER: ICD-10-CM

## 2022-09-09 PROCEDURE — 77067 SCR MAMMO BI INCL CAD: CPT

## 2022-09-27 ENCOUNTER — APPOINTMENT (OUTPATIENT)
Dept: CT IMAGING | Facility: CLINIC | Age: 67
End: 2022-09-27
Attending: EMERGENCY MEDICINE
Payer: COMMERCIAL

## 2022-09-27 ENCOUNTER — APPOINTMENT (OUTPATIENT)
Dept: MRI IMAGING | Facility: CLINIC | Age: 67
End: 2022-09-27
Attending: EMERGENCY MEDICINE
Payer: COMMERCIAL

## 2022-09-27 ENCOUNTER — HOSPITAL ENCOUNTER (EMERGENCY)
Facility: CLINIC | Age: 67
Discharge: HOME | End: 2022-09-27
Attending: EMERGENCY MEDICINE | Admitting: EMERGENCY MEDICINE
Payer: COMMERCIAL

## 2022-09-27 VITALS
OXYGEN SATURATION: 97 % | SYSTOLIC BLOOD PRESSURE: 133 MMHG | RESPIRATION RATE: 18 BRPM | BODY MASS INDEX: 24.72 KG/M2 | WEIGHT: 139.55 LBS | HEART RATE: 80 BPM | TEMPERATURE: 97 F | DIASTOLIC BLOOD PRESSURE: 74 MMHG

## 2022-09-27 DIAGNOSIS — J01.00 ACUTE MAXILLARY SINUSITIS, RECURRENCE NOT SPECIFIED: ICD-10-CM

## 2022-09-27 DIAGNOSIS — R42 VERTIGO: ICD-10-CM

## 2022-09-27 LAB
ANION GAP SERPL CALCULATED.3IONS-SCNC: 8 MMOL/L (ref 7–15)
APTT PPP: <20 SECONDS (ref 22–38)
BASOPHILS # BLD AUTO: 0 10E3/UL (ref 0–0.2)
BASOPHILS NFR BLD AUTO: 1 %
BUN SERPL-MCNC: 18 MG/DL (ref 8–23)
CALCIUM SERPL-MCNC: 8.5 MG/DL (ref 8.8–10.2)
CHLORIDE SERPL-SCNC: 106 MMOL/L (ref 98–107)
CREAT SERPL-MCNC: 0.73 MG/DL (ref 0.51–0.95)
DEPRECATED HCO3 PLAS-SCNC: 24 MMOL/L (ref 22–29)
EOSINOPHIL # BLD AUTO: 0.1 10E3/UL (ref 0–0.7)
EOSINOPHIL NFR BLD AUTO: 1 %
ERYTHROCYTE [DISTWIDTH] IN BLOOD BY AUTOMATED COUNT: 12.1 % (ref 10–15)
GFR SERPL CREATININE-BSD FRML MDRD: 90 ML/MIN/1.73M2
GLUCOSE BLDC GLUCOMTR-MCNC: 92 MG/DL (ref 70–99)
GLUCOSE SERPL-MCNC: 98 MG/DL (ref 70–99)
HCT VFR BLD AUTO: 35.2 % (ref 35–47)
HGB BLD-MCNC: 11.4 G/DL (ref 11.7–15.7)
IMM GRANULOCYTES # BLD: 0 10E3/UL
IMM GRANULOCYTES NFR BLD: 1 %
INR PPP: 1 (ref 0.85–1.15)
LYMPHOCYTES # BLD AUTO: 1.5 10E3/UL (ref 0.8–5.3)
LYMPHOCYTES NFR BLD AUTO: 25 %
MCH RBC QN AUTO: 29.8 PG (ref 26.5–33)
MCHC RBC AUTO-ENTMCNC: 32.4 G/DL (ref 31.5–36.5)
MCV RBC AUTO: 92 FL (ref 78–100)
MONOCYTES # BLD AUTO: 0.4 10E3/UL (ref 0–1.3)
MONOCYTES NFR BLD AUTO: 6 %
NEUTROPHILS # BLD AUTO: 3.9 10E3/UL (ref 1.6–8.3)
NEUTROPHILS NFR BLD AUTO: 66 %
NRBC # BLD AUTO: 0 10E3/UL
NRBC BLD AUTO-RTO: 0 /100
PLATELET # BLD AUTO: 235 10E3/UL (ref 150–450)
POTASSIUM SERPL-SCNC: 3.8 MMOL/L (ref 3.4–5.3)
RBC # BLD AUTO: 3.83 10E6/UL (ref 3.8–5.2)
SODIUM SERPL-SCNC: 138 MMOL/L (ref 136–145)
TROPONIN T SERPL HS-MCNC: 7 NG/L
WBC # BLD AUTO: 5.9 10E3/UL (ref 4–11)

## 2022-09-27 PROCEDURE — 85730 THROMBOPLASTIN TIME PARTIAL: CPT | Performed by: EMERGENCY MEDICINE

## 2022-09-27 PROCEDURE — 258N000003 HC RX IP 258 OP 636: Performed by: EMERGENCY MEDICINE

## 2022-09-27 PROCEDURE — 250N000011 HC RX IP 250 OP 636: Performed by: EMERGENCY MEDICINE

## 2022-09-27 PROCEDURE — 85025 COMPLETE CBC W/AUTO DIFF WBC: CPT | Performed by: EMERGENCY MEDICINE

## 2022-09-27 PROCEDURE — 255N000002 HC RX 255 OP 636: Performed by: EMERGENCY MEDICINE

## 2022-09-27 PROCEDURE — 96374 THER/PROPH/DIAG INJ IV PUSH: CPT | Mod: 59

## 2022-09-27 PROCEDURE — 70498 CT ANGIOGRAPHY NECK: CPT

## 2022-09-27 PROCEDURE — 70450 CT HEAD/BRAIN W/O DYE: CPT | Mod: XS

## 2022-09-27 PROCEDURE — 36415 COLL VENOUS BLD VENIPUNCTURE: CPT | Performed by: EMERGENCY MEDICINE

## 2022-09-27 PROCEDURE — A9585 GADOBUTROL INJECTION: HCPCS | Performed by: EMERGENCY MEDICINE

## 2022-09-27 PROCEDURE — 99285 EMERGENCY DEPT VISIT HI MDM: CPT | Mod: 25

## 2022-09-27 PROCEDURE — 93005 ELECTROCARDIOGRAM TRACING: CPT

## 2022-09-27 PROCEDURE — 85610 PROTHROMBIN TIME: CPT | Performed by: EMERGENCY MEDICINE

## 2022-09-27 PROCEDURE — 70496 CT ANGIOGRAPHY HEAD: CPT

## 2022-09-27 PROCEDURE — 84484 ASSAY OF TROPONIN QUANT: CPT | Performed by: EMERGENCY MEDICINE

## 2022-09-27 PROCEDURE — 82310 ASSAY OF CALCIUM: CPT | Performed by: EMERGENCY MEDICINE

## 2022-09-27 PROCEDURE — 250N000009 HC RX 250: Performed by: EMERGENCY MEDICINE

## 2022-09-27 PROCEDURE — 96361 HYDRATE IV INFUSION ADD-ON: CPT

## 2022-09-27 PROCEDURE — 70553 MRI BRAIN STEM W/O & W/DYE: CPT

## 2022-09-27 PROCEDURE — 250N000013 HC RX MED GY IP 250 OP 250 PS 637: Performed by: EMERGENCY MEDICINE

## 2022-09-27 RX ORDER — MECLIZINE HYDROCHLORIDE 25 MG/1
25 TABLET ORAL 3 TIMES DAILY PRN
Qty: 30 TABLET | Refills: 0 | Status: SHIPPED | OUTPATIENT
Start: 2022-09-27 | End: 2023-09-19

## 2022-09-27 RX ORDER — DIAZEPAM 5 MG
5 TABLET ORAL EVERY 6 HOURS PRN
Qty: 10 TABLET | Refills: 0 | Status: SHIPPED | OUTPATIENT
Start: 2022-09-27 | End: 2023-01-03

## 2022-09-27 RX ORDER — IOPAMIDOL 755 MG/ML
500 INJECTION, SOLUTION INTRAVASCULAR ONCE
Status: COMPLETED | OUTPATIENT
Start: 2022-09-27 | End: 2022-09-27

## 2022-09-27 RX ORDER — MECLIZINE HYDROCHLORIDE 25 MG/1
25 TABLET ORAL ONCE
Status: COMPLETED | OUTPATIENT
Start: 2022-09-27 | End: 2022-09-27

## 2022-09-27 RX ORDER — GADOBUTROL 604.72 MG/ML
6 INJECTION INTRAVENOUS ONCE
Status: COMPLETED | OUTPATIENT
Start: 2022-09-27 | End: 2022-09-27

## 2022-09-27 RX ORDER — LORAZEPAM 2 MG/ML
1 INJECTION INTRAMUSCULAR ONCE
Status: COMPLETED | OUTPATIENT
Start: 2022-09-27 | End: 2022-09-27

## 2022-09-27 RX ORDER — SODIUM CHLORIDE 9 MG/ML
INJECTION, SOLUTION INTRAVENOUS CONTINUOUS
Status: DISCONTINUED | OUTPATIENT
Start: 2022-09-27 | End: 2022-09-28 | Stop reason: HOSPADM

## 2022-09-27 RX ADMIN — SODIUM CHLORIDE 80 ML: 9 INJECTION, SOLUTION INTRAVENOUS at 17:57

## 2022-09-27 RX ADMIN — SODIUM CHLORIDE 1000 ML: 9 INJECTION, SOLUTION INTRAVENOUS at 18:34

## 2022-09-27 RX ADMIN — LORAZEPAM 1 MG: 2 INJECTION INTRAMUSCULAR; INTRAVENOUS at 18:36

## 2022-09-27 RX ADMIN — IOPAMIDOL 75 ML: 755 INJECTION, SOLUTION INTRAVENOUS at 17:57

## 2022-09-27 RX ADMIN — MECLIZINE HYDROCHLORIDE 25 MG: 25 TABLET ORAL at 18:39

## 2022-09-27 RX ADMIN — GADOBUTROL 6 ML: 604.72 INJECTION INTRAVENOUS at 19:33

## 2022-09-27 ASSESSMENT — ACTIVITIES OF DAILY LIVING (ADL)
ADLS_ACUITY_SCORE: 35
ADLS_ACUITY_SCORE: 35

## 2022-09-27 NOTE — ED TRIAGE NOTES
Pt laid down for a nap around 1530, got up from nap 1 hour PTA and lost balance, couldn't walk straight with diaphoresis, nausea,86 and weakness. Pt states she is feeling very weak and shaky with difficulty focusing on arrival and still feels very unsteady on feet. Denies HA, numbness, tingling, facial droop, or one sided weakness.     Dr. Hancock in triage room 1 for stroke evaluation.

## 2022-09-27 NOTE — CONSULTS
[facility]    Stroke Telephone Note    I was called by Kwabena Hancock on 09/27/22 regarding patient Le Vora. The patient is a 67 year old female with known medical history of hypertension and history of pulsatile right ear sensation. She went for a nap at 1530 this afternoon and when she woke up around 1600, she was experiencing dizziness, unsteadiness and nausea. There were no other focal neurological exam findings on exam. NIHSS zero. Presenting /94.    Stroke Code Data (for stroke code without tele)  Stroke code activated 09/27/22   1740   Stroke provider first response  09/27/22   1741            Last known normal 09/27/22   1530        Time of discovery   (or onset of symptoms) 09/27/22   1600   Head CT read by Stroke Neuro Dr/Provider 09/27/22   1751   Was stroke code de-escalated? Yes 09/27/22 1830          Imaging Findings   MRI brain:  IMPRESSION:  1.  No acute infarct.  2.  Mild age-related changes described above.  3.  Right maxillary sinus air-fluid level versus posterior mucosal thickening. Please correlate for acute sinusitis.    IMPRESSION:   HEAD CT:  1.  No discrete mass lesion, hemorrhage or focal area suggestive of acute infarct.     HEAD CTA:   1.  No discrete vessel occlusion, significant stenosis, aneurysm or high flow vascular malformation involving the arteries of the [place].     NECK CTA:  1.  Normal configuration of the great vessels off the aortic arch with no significant stenosis of their origins.  2.  No significant stenosis or irregularity involving the arteries of the neck by NASCET criteria.  3.  No radiographic evidence of dissection.    Intravenous Thrombolysis  Not given due to:   - minor/isolated/quickly resolving symptoms    Endovascular Treatment  Not initiated due to absence of proximal vessel occlusion    Impression  Episode of vertigo, positional mainly, no other neurological deficits:    No other neurological deficits  "on exam. CT, CTA are negative with no concerns of atherosclerosis. MRI obtained negative for ischemic infarct.    Recommendations   No further stroke recommendations    My recommendations are based on the information provided over the phone by Le Vora's in-person providers. They are not intended to replace the clinical judgment of her in-person providers. I was not requested to personally see or examine the patient at this time.    The Stroke Staff is Dr. Rico.    Janae Myrick MD  Vascular Neurology Fellow  To page me or covering stroke neurology team member, click here: [facility]COM   Choose \"On Call\" tab at top, then search dropdown box for \"Neurology Adult\", select location, press Enter, then look for stroke/neuro ICU/telestroke.      "

## 2022-09-27 NOTE — ED PROVIDER NOTES
History     Chief Complaint:  Dizziness       HPI   Le Vora is a 67 year old female who presents with  for evaluation of dizziness. She took a nap around 330-4p feeling tired but generally well. Awoke shortly after 4p with profound dizziness, nausea, unsteadiness, neck discomfort (she repots her head was tilted backwards on a chair with neck is extended position while napping). Has chronic issues with pulsating sensation in R ear but no dizzy issues. Sx worse wth head movement but still present at rest. No CP/SOB/fever. Has h/o HTN. No HA. No focal numbness/weakness.     ROS:  Review of Systems  A 10 point ROS was obtained and negative except as noted here and in HPI      Allergies:  Mepivacaine  Tetracycline     Medications:    amoxicillin-clavulanate (AUGMENTIN) 875-125 MG tablet  diazepam (VALIUM) 5 MG tablet  meclizine (ANTIVERT) 25 MG tablet  calcium carbonate-vitamin D (OS-KENDELL) 600-400 MG-UNIT chewable tablet  Cetirizine HCl (ZYRTEC ALLERGY PO)  Ibuprofen (ADVIL PO)  levothyroxine (SYNTHROID/LEVOTHROID) 100 MCG tablet  lisinopril (ZESTRIL) 10 MG tablet  Multiple Vitamins-Minerals (WOMENS MULTIVITAMIN PO)        Past Medical History:    Past Medical History:   Diagnosis Date     Basal cell carcinoma nos 2010     Mitral valve disorders(424.0)      Slow transit constipation      Tension headache      Unspecified hypothyroidism        Past Surgical History:    Past Surgical History:   Procedure Laterality Date     GYN SURGERY      infertility studies      [facility] NONSPECIFIC PROCEDURE  1995    s/p laparscopic surgery for infertility        Family History:    family history includes Bladder Cancer in her sister; C.A.D. in her mother; Cancer in her father; Diabetes in her mother.    Social History:   reports that she has never smoked. She has never used smokeless tobacco. She reports that she does not drink alcohol and does not use drugs.  PCP: Brii Sherwood     Physical Exam     Patient Vitals for  the past 24 hrs:   BP Temp Temp src Pulse Resp SpO2 Weight   09/27/22 1900 133/74 -- -- 80 18 97 % --   09/27/22 1845 137/71 -- -- 84 22 99 % --   09/27/22 1830 131/72 -- -- 78 8 100 % --   09/27/22 1815 133/69 -- -- 85 12 100 % --   09/27/22 1749 -- -- -- -- -- -- 63.3 kg (139 lb 8.8 oz)   09/27/22 1734 (!) 173/94 97  F (36.1  C) Temporal 86 18 99 % --        Physical Exam  VS: Reviewed per above  HENT: Mucous membranes moist, no nuchal rigidity  EYES: sclera anicteric  CV: Rate as noted, regular rhythm.   RESP: Effort normal.   NEURO: GCS 15, cranial nerves II through XII are intact, 5 out of 5 strength in all 4 extremities, sensation is intact light touch in all 4 extremities. Normal FNF testing BL. Ataxia present.   MSK: No deformity of the extremities  SKIN: Warm and dry    Emergency Department Course   ECG:  ECG results from 09/27/22   EKG 12-lead, tracing only     Value    Systolic Blood Pressure     Diastolic Blood Pressure     Ventricular Rate 85    Atrial Rate 85    MA Interval 186    QRS Duration 96        QTc 449    P Axis 80    R AXIS 58    T Axis 17    Interpretation ECG      Sinus rhythm  Nonspecific ST and T wave abnormality  Abnormal ECG  No previous ECGs available         Imaging:  MR Brain w/o & w Contrast   Final Result   IMPRESSION:   1.  No acute infarct.   2.  Mild age-related changes described above.   3.  Right maxillary sinus air-fluid level versus posterior mucosal thickening. Please correlate for acute sinusitis.      CTA Head Neck w Contrast   Final Result   IMPRESSION:    HEAD CT:   1.  No discrete mass lesion, hemorrhage or focal area suggestive of acute infarct.      HEAD CTA:    1.  No discrete vessel occlusion, significant stenosis, aneurysm or high flow vascular malformation involving the arteries of the [place].      NECK CTA:   1.  Normal configuration of the great vessels off the aortic arch with no significant stenosis of their origins.   2.  No significant  stenosis or irregularity involving the arteries of the neck by NASCET criteria.   3.  No radiographic evidence of dissection.      Head CT findings were communicated to Dr. Hancock at 6:00 p.m. CTA results were communicated to Dr. Hancock at 6:30 PM on 09/27/2022. Of note there is a delayed between the two studies due to IV access problems.      CT Head w/o Contrast   Final Result   IMPRESSION:    HEAD CT:   1.  No discrete mass lesion, hemorrhage or focal area suggestive of acute infarct.      HEAD CTA:    1.  No discrete vessel occlusion, significant stenosis, aneurysm or high flow vascular malformation involving the arteries of the [place].      NECK CTA:   1.  Normal configuration of the great vessels off the aortic arch with no significant stenosis of their origins.   2.  No significant stenosis or irregularity involving the arteries of the neck by NASCET criteria.   3.  No radiographic evidence of dissection.      Head CT findings were communicated to Dr. Hancock at 6:00 p.m. CTA results were communicated to Dr. Hancock at 6:30 PM on 09/27/2022. Of note there is a delayed between the two studies due to IV access problems.         Report per radiology    Laboratory:  Labs Ordered and Resulted from Time of ED Arrival to Time of ED Departure   BASIC METABOLIC PANEL - Abnormal       Result Value    Sodium 138      Potassium 3.8      Chloride 106      Carbon Dioxide (CO2) 24      Anion Gap 8      Urea Nitrogen 18.0      Creatinine 0.73      Calcium 8.5 (*)     Glucose 98      GFR Estimate 90     PARTIAL THROMBOPLASTIN TIME - Abnormal    aPTT <20 (*)    CBC WITH PLATELETS AND DIFFERENTIAL - Abnormal    WBC Count 5.9      RBC Count 3.83      Hemoglobin 11.4 (*)     Hematocrit 35.2      MCV 92      MCH 29.8      MCHC 32.4      RDW 12.1      Platelet Count 235      % Neutrophils 66      % Lymphocytes 25      % Monocytes 6      % Eosinophils 1      % Basophils 1      % Immature Granulocytes 1       NRBCs per 100 WBC 0      Absolute Neutrophils 3.9      Absolute Lymphocytes 1.5      Absolute Monocytes 0.4      Absolute Eosinophils 0.1      Absolute Basophils 0.0      Absolute Immature Granulocytes 0.0      Absolute NRBCs 0.0     INR - Normal    INR 1.00     TROPONIN T, HIGH SENSITIVITY - Normal    Troponin T, High Sensitivity 7     GLUCOSE BY METER - Normal    GLUCOSE BY METER POCT 92     GLUCOSE MONITOR NURSING POCT        Emergency Department Course:             Reviewed:  I reviewed nursing notes, vitals and past medical history    Assessments:   I obtained history and examined the patient as noted above.    I rechecked the patient and explained findings.       Consults:   Stroke neurology    Interventions:  Medications   0.9% sodium chloride BOLUS (1,000 mLs Intravenous New Bag 9/27/22 1834)     Followed by   sodium chloride 0.9% infusion (has no administration in time range)   CT Scan Flush (80 mLs Intravenous Given 9/27/22 1757)   iopamidol (ISOVUE-370) solution 500 mL (75 mLs Intravenous Given 9/27/22 1757)   LORazepam (ATIVAN) injection 1 mg (1 mg Intravenous Given 9/27/22 1836)   meclizine (ANTIVERT) tablet 25 mg (25 mg Oral Given 9/27/22 1839)   gadobutrol (GADAVIST) injection 6 mL (6 mLs Intravenous Given 9/27/22 1933)        Disposition:  The patient was discharged to home.     Impression & Plan      Medical Decision Making:  Patient presents to the ER for evaluation of dizziness, nausea, unsteadiness after waking up from her nap shortly before arrival.  On arrival vital signs are reassuring.  She appears quite uncomfortable and is unable to stand alone let alone take a few steps.  Tier 1 stroke code was called due to concern for posterior circulation process.  Initial CT of the head and CTA of the head and neck did not show acute pathology.  MRI was recommended which also fortunately did not show central cause of her vertigo.  There was question of maxillary sinusitis.  After IV fluids, Ativan,  meclizine, patient was feeling better although still slightly symptomatic.  Discussed observation admission versus discharge home with outpatient follow-up.  Patient preferred discharge home.  Prescriptions for Ativan, meclizine, Augmentin provided.  Information for dizzy and balance clinic provided as well.  Referral to vestibular therapy provided as well.  Return precautions discussed prior to discharge.    Diagnosis:    ICD-10-CM    1. Vertigo  R42 Physical Therapy Referral   2. Acute maxillary sinusitis, recurrence not specified  J01.00         Discharge Medications:  New Prescriptions    AMOXICILLIN-CLAVULANATE (AUGMENTIN) 875-125 MG TABLET    Take 1 tablet by mouth 2 times daily for 7 days    DIAZEPAM (VALIUM) 5 MG TABLET    Take 1 tablet (5 mg) by mouth every 6 hours as needed (vertigo)    MECLIZINE (ANTIVERT) 25 MG TABLET    Take 1 tablet (25 mg) by mouth 3 times daily as needed for dizziness        9/27/2022   Kwabena Hancock MD Lindenbaum, Elan, MD  09/27/22 0645

## 2022-09-27 NOTE — ED NOTES
Rapid Assessment Note    History:   Le Vora is a 67 year old female who presents with  for evaluation of dizziness. Took nap around 330-4p feeling tired but generally well. Awoke shortly after 4p with profound dizziness, nausea, unsteadiness, neck discomfort. Has chronic issues with pulsating sensation in R ear but no dizzy issues. Sx worse wth head movement but still present at rest. No CP/SOB/fever. Has h/o HTN. No HA. No focal numbness/weakness.     Exam:   VS: Reviewed per above  HENT: Mucous membranes moist, no nuchal rigidity  EYES: sclera anicteric  CV: Rate as noted, regular rhythm.   RESP: Effort normal.   NEURO: GCS 15, cranial nerves II through XII are intact, 5 out of 5 strength in all 4 extremities, sensation is intact light touch in all 4 extremities. Normal FNF testing BL. Ataxia present.   MSK: No deformity of the extremities  SKIN: Warm and dry    Plan of Care:   I evaluated the patient and developed an initial plan of care. I discussed this plan and explained that I, or one of my partners, would be returning to complete the evaluation.     5:45 PM: I spoke with neurology     9/27/2022  [employer]    Portions of this medical record were completed by a scribe. UPON MY REVIEW AND AUTHENTICATION BY ELECTRONIC SIGNATURE, this confirms (a) I performed the applicable clinical services, and (b) the record is accurate.        Kwabena Hancock MD  09/27/22 1747       Kwabena Hancock MD  09/27/22 1742

## 2022-09-28 LAB
ATRIAL RATE - MUSE: 85 BPM
DIASTOLIC BLOOD PRESSURE - MUSE: NORMAL MMHG
INTERPRETATION ECG - MUSE: NORMAL
P AXIS - MUSE: 80 DEGREES
PR INTERVAL - MUSE: 186 MS
QRS DURATION - MUSE: 96 MS
QT - MUSE: 378 MS
QTC - MUSE: 449 MS
R AXIS - MUSE: 58 DEGREES
SYSTOLIC BLOOD PRESSURE - MUSE: NORMAL MMHG
T AXIS - MUSE: 17 DEGREES
VENTRICULAR RATE- MUSE: 85 BPM

## 2022-09-28 NOTE — ED NOTES
"Pt ambulated with assistance from room 25 to 29 and back. Pt appeared to drift to both sides while walking and endorsed a subtle headache. Pt did not complain of dizziness but said her head felt \"very heavy.\" RN and MD notified.   "

## 2022-09-28 NOTE — DISCHARGE INSTRUCTIONS
Discharge Instructions  Vertigo  You have been diagnosed with vertigo.  This is a dizzy feeling often described as spinning or that the room is moving around you. You will often have nausea (sick to your stomach), vomiting (throwing up), and balance problems with it.  Vertigo is usually caused by a problem in the inner ear which helps control your balance.  Many things can cause vertigo, including calcium collections in the inner ear, a virus infection of the inner ear, concussion, migraine, and some medicines.  Luckily, these causes are not life threatening and will eventually go away.  However, sometimes there is a serious problem that does not show up right away.  Generally, every Emergency Department visit should have a follow-up clinic visit with either a primary or a specialty clinic/provider. Please follow-up as instructed by your emergency provider today.  Return to the Emergency Department if you have:  New or severe headache.  Double vision (seeing two of things).  Trouble speaking or hearing.  Weakness or trouble moving/using one side of your body.  Passing out.  Numbness or tingling.  Chest pain.  Vomiting that will not stop.    Treatment:  There are several commonly prescribed medications:  Antihistamines such as meclizine (Antivert ), dimenhydrinate (Dramamine ), or diphenhydramine (Benadryl ).  Prescription anti-nausea medicines, such as promethazine (Phenergan ), metoclopramide (Reglan ), or ondansetron (Zofran ).  Prescription sedative medicines, such as diazepam (Valium ), lorazepam (Ativan ), or clonazepam (Klonopin ).  Most of these medicines make you sleepy, and you should not take them before you work or drive. You should only take prescription medicines to treat severe vertigo symptoms, and you should stop the medicine when your symptoms improve.    Follow Up:  If you have vertigo longer than three days, it is important that you follow up either with your primary provider or an Ear, Nose, and  Throat (ENT) specialist.  You may need further testing to evaluate your vertigo and you may also need  vestibular  therapy which is a special form of physical therapy to make the vertigo go away.    If you were given a prescription for medicine here today, be sure to read all of the information (including the package insert) that comes with your prescription.  This will include important information about the medicine, its side effects, and any warnings that you need to know about.  The pharmacist who fills the prescription can provide more information and answer questions you may have about the medicine.  If you have questions or concerns that the pharmacist cannot address, please call or return to the Emergency Department.     Remember that you can always come back to the Emergency Department if you are not able to see your regular provider in the amount of time listed above, if you get any new symptoms, or if there is anything that worries you.    Discharge Instructions  Sinus Infection    You have acute sinusitis, or an infection of the sinuses. The sinuses are the hollow areas within the facial bones that are connected to the nasal opening. The most common cause of acute sinusitis is a virus infection associated with the common cold. Bacterial sinusitis occurs much less commonly, usually as a complication of viral sinusitis. Experts say that most sinusitis is caused by a virus within the first 7-10 days of illness. Antibiotics do nothing to help with virus infections, so most people do not need antibiotics for acute sinusitis.     Generally, every Emergency Department visit should have a follow-up clinic visit with either a primary or a specialty clinic/provider. Please follow-up as instructed by your emergency provider today.    Return to the Emergency Department if:  Your vision changes.  You are confused or have difficulty thinking clearly.  You have swelling around your eye.  You develop a severe headache  or neck stiffness.  Your symptoms get worse and you are unable to see your primary provider.      Treatment:  Pain relief -- Non-prescription pain medications, such as Tylenol  (acetaminophen) or Motrin  or Advil  (ibuprofen) are recommended for pain.  Do not use a medicine that you are allergic to, or if your provider has told you not to use it.     Nasal irrigation -- Flushing the nose and sinuses with a saline solution several times per day can help to decrease pain caused by congestion.  Nasal decongestants -- Nasal decongestant sprays, including Afrin  (oxymetazoline) and Carlos-Synephrine  (phenylephrine) can be used to temporarily treat congestion. However, these sprays should not be used for more than two to three days due to the risk of rebound congestion (when the nose is congested constantly unless the medication is used repeatedly).  Nasal glucocorticoids -- These are prescription steroids delivered by a nasal spray that can help to reduce swelling inside the nose, usually within two to three days. These drugs have few side effects and dramatically relieve symptoms in most people.  If you use these in conjunction with Afrin  you will need to use at least 15 minutes prior to the nasal decongestant.    Antibiotic? -- Rarely antibiotics are used along with the above treatments.    If you were given a prescription for medicine here today, be sure to read all of the information (including the package insert) that comes with your prescription.  This will include important information about the medicine, its side effects, and any warnings that you need to know about.  The pharmacist who fills the prescription can provide more information and answer questions you may have about the medicine.  If you have questions or concerns that the pharmacist cannot address, please call or return to the Emergency Department.   Remember that you can always come back to the Emergency Department if you are not able to see your  regular provider in the amount of time listed above, if you get any new symptoms, or if there is anything that worries you.

## 2022-10-07 ENCOUNTER — MYC MEDICAL ADVICE (OUTPATIENT)
Dept: INTERNAL MEDICINE | Facility: CLINIC | Age: 67
End: 2022-10-07

## 2022-12-03 ENCOUNTER — MYC MEDICAL ADVICE (OUTPATIENT)
Dept: INTERNAL MEDICINE | Facility: CLINIC | Age: 67
End: 2022-12-03

## 2022-12-05 NOTE — TELEPHONE ENCOUNTER
Pls see patient [employer] message and script request.   If sleep Rx is prescribed, this is the pharmacy it can go to    Patient call back number [phone]     Patient knows Dr Sherwood is out and hoping another MD can fill this for her

## 2022-12-06 ENCOUNTER — TELEPHONE (OUTPATIENT)
Dept: INTERNAL MEDICINE | Facility: CLINIC | Age: 67
End: 2022-12-06

## 2022-12-06 DIAGNOSIS — F43.0 ACUTE REACTION TO STRESS: Primary | ICD-10-CM

## 2022-12-06 RX ORDER — LORAZEPAM 0.5 MG/1
0.5 TABLET ORAL EVERY 8 HOURS PRN
Qty: 40 TABLET | Refills: 1 | Status: SHIPPED | OUTPATIENT
Start: 2022-12-06 | End: 2023-02-08

## 2022-12-06 NOTE — TELEPHONE ENCOUNTER
Please see 12/06/2022 TE. Patient spoke with Dr. Sherwood and was given rx for lorazepam. [employer] message sent to patient.

## 2022-12-06 NOTE — TELEPHONE ENCOUNTER
Patient was in with her  who was seeing another provider in the clinic today.  She is reported she is very stressed about her 's new cancer diagnosis and is wondering about some medication for anxiety.    Advised to let her know I will send a prescription for some lorazepam for her to try.

## 2022-12-07 ENCOUNTER — TELEPHONE (OUTPATIENT)
Dept: INTERNAL MEDICINE | Facility: CLINIC | Age: 67
End: 2022-12-07

## 2022-12-07 NOTE — TELEPHONE ENCOUNTER
Prior Authorization Retail Medication Request    Medication/Dose: LORazepam (ATIVAN) 0.5 MG tablet  ICD code (if different than what is on RX):  Acute reaction to stress [F43.0]  Previously Tried and Failed:   Rationale:     Insurance Name:  [employer]  Insurance ID:  [id number]    Pharmacy Information (if different than what is on RX)  Name: [facility] [address] [place] [address] [id number]  Phone:  [phone]

## 2022-12-08 NOTE — TELEPHONE ENCOUNTER
Central Prior Authorization Team   Phone: [phone]      PA Initiation    Medication: LORazepam (ATIVAN) 0.5 MG tablet  Insurance Company: Other (see comments)Comment:  [employer] [phone]  Pharmacy Filling the Rx: [facility] [address] [place] [address] [id number]  Filling Pharmacy Phone: [phone]  Filling Pharmacy Fax:    Start Date: 12/8/2022

## 2022-12-08 NOTE — TELEPHONE ENCOUNTER
Prior Authorization Approval    Authorization Effective Date: 9/9/2022  Authorization Expiration Date: 12/8/2023  Medication: LORazepam (ATIVAN) 0.5 MG tablet-APPROVED  Approved Dose/Quantity:   Reference #:     Insurance Company: Other (see comments)Comment:  [employer] [phone]  Expected CoPay:       CoPay Card Available:      Foundation Assistance Needed:    Which Pharmacy is filling the prescription (Not needed for infusion/clinic administered): [facility] [address] [place] [id number] [facility]  Pharmacy Notified: Yes  Patient Notified: No  **Instructed pharmacy to notify patient when script is ready to /ship.**

## 2022-12-27 ENCOUNTER — MYC MEDICAL ADVICE (OUTPATIENT)
Dept: INTERNAL MEDICINE | Facility: CLINIC | Age: 67
End: 2022-12-27

## 2022-12-29 NOTE — TELEPHONE ENCOUNTER
Call placed to patient. Future appointment scheduled.    Appointments in Next Year    Jan 03, 2023  3:30 PM  (Arrive by 3:10 PM)  Provider Visit with Brii Sherwood MD  [facility] ([facility] ) [phone]

## 2023-01-03 ENCOUNTER — VIRTUAL VISIT (OUTPATIENT)
Dept: INTERNAL MEDICINE | Facility: CLINIC | Age: 68
End: 2023-01-03
Payer: COMMERCIAL

## 2023-01-03 DIAGNOSIS — F43.0 ACUTE REACTION TO STRESS: Primary | ICD-10-CM

## 2023-01-03 PROCEDURE — 99213 OFFICE O/P EST LOW 20 MIN: CPT | Mod: 95 | Performed by: INTERNAL MEDICINE

## 2023-01-03 ASSESSMENT — PATIENT HEALTH QUESTIONNAIRE - PHQ9: SUM OF ALL RESPONSES TO PHQ QUESTIONS 1-9: 14

## 2023-01-03 NOTE — PROGRESS NOTES
Le is a 67 year old who is being evaluated via a billable video visit.      How would you like to obtain your AVS? [place]  If the video visit is dropped, the invitation should be resent by: Text to cell phone: [phone]  Will anyone else be joining your video visit? No          Assessment & Plan     Acute reaction to stress  Advised that I would not necessarily label her as having a significant mood disorder at this time.  She is getting some help from using the lorazepam, will continue that for now.  May consider trying an over-the-counter sleep aid or melatonin for the short-term to help improve her sleep.  Hopefully once the routine gets to be a little bit more regular and they have had a little more time to adjust to the new diagnosis, she will feel some improvement in symptoms and consider to decrease the lorazepam and use less frequently.  If she feels she is getting worse where it is difficult to get anything done or be organized, then we could consider starting another medication.  If she continues to need something for acute symptoms, may consider gabapentin or hydroxyzine.  Recommend she follow-up with me.  Ammon when she needs her next lorazepam refill.        Return in about 8 months (around 8/26/2023) for Wellness visit.    Brii Sherwood MD  [facility]    Subjective   Le is a 67 year old, presenting for the following health issues:  Acute stress: Her  was diagnosed with a glioblastoma, has been hospitalized at [facility] and is just discharged today.  This has been extremely stressful for her, he is going to start some radiation and chemo tomorrow instead of surgery as he is very concerned about memory loss from the surgery.  This has triggered a lot of problems with sleep and anxiety for her.  She is awakening multiple times during the night, has a hard time getting to sleep and getting back to sleep.  During the day she gets episodes of anxiety and shaking.   She has had nausea, decreased appetite.    When her  and come in to see his primary, had ordered some lorazepam for her.  The first she was taking it once a day in the late afternoon and that did seem to help her get to sleep but lately it has not seem to help her with sleep as much though it still seems to help decrease anxiety symptoms during the day.    She is wondering whether she needs to be on some other medication more for long-term mood issues.  She has not had problems with mood in the past.    History of Present Illness       Reason for visit:  Anxiety, stress related to severe illness with spouse    She eats 2-3 servings of fruits and vegetables daily.She consumes 1 sweetened beverage(s) daily.She exercises with enough effort to increase her heart rate 9 or less minutes per day.  She exercises with enough effort to increase her heart rate 3 or less days per week.   She is taking medications regularly.         Patient Active Problem List   Diagnosis     Advanced directives, counseling/discussion     Osteoporosis     Acquired hypothyroidism     Benign essential hypertension     CARDIOVASCULAR SCREENING; LDL GOAL LESS THAN 130     Adverse reaction to drug     Current Outpatient Medications   Medication Sig Dispense Refill     calcium carbonate-vitamin D (OS-KENDELL) 600-400 MG-UNIT chewable tablet Take 1 chew tab by mouth daily. 180 tablet 3     Cetirizine HCl (ZYRTEC ALLERGY PO) Take by mouth as needed       Ibuprofen (ADVIL PO) Take 200 mg by mouth       levothyroxine (SYNTHROID/LEVOTHROID) 100 MCG tablet Take 1 tablet (100 mcg) by mouth daily 90 tablet 3     lisinopril (ZESTRIL) 10 MG tablet TAKE 1 TABLET (10 MG) BY MOUTH 2 TIMES DAILY 180 tablet 2     LORazepam (ATIVAN) 0.5 MG tablet Take 1 tablet (0.5 mg) by mouth every 8 hours as needed for anxiety 40 tablet 1     meclizine (ANTIVERT) 25 MG tablet Take 1 tablet (25 mg) by mouth 3 times daily as needed for dizziness 30 tablet 0     Multiple  Vitamins-Minerals (WOMENS MULTIVITAMIN PO) Take by mouth daily          Review of Systems   Negative      Objective        Vitals:  No vitals were obtained today due to virtual visit.    Physical Exam   GENERAL: Healthy, alert and no distress  EYES: Eyes grossly normal to inspection.  No discharge or erythema, or obvious scleral/conjunctival abnormalities.  RESP: No audible wheeze, cough, or visible cyanosis.  No visible retractions or increased work of breathing.    SKIN: Visible skin clear. No significant rash, abnormal pigmentation or lesions.  NEURO: Cranial nerves grossly intact.  Mentation and speech appropriate for age.  PSYCH: She appears mildly anxious                Video-Visit Details    Type of service:  Video Visit   Video Start Time: 3:50  Video End Time:4:13    Originating Location (pt. Location): Home    Distant Location (provider location):  On-site  Platform used for Video Visit: Bozena

## 2023-01-16 ENCOUNTER — ANCILLARY PROCEDURE (OUTPATIENT)
Dept: GENERAL RADIOLOGY | Facility: CLINIC | Age: 68
End: 2023-01-16
Attending: PHYSICIAN ASSISTANT
Payer: COMMERCIAL

## 2023-01-16 ENCOUNTER — OFFICE VISIT (OUTPATIENT)
Dept: INTERNAL MEDICINE | Facility: CLINIC | Age: 68
End: 2023-01-16
Payer: COMMERCIAL

## 2023-01-16 VITALS
SYSTOLIC BLOOD PRESSURE: 129 MMHG | BODY MASS INDEX: 22.68 KG/M2 | TEMPERATURE: 98.4 F | DIASTOLIC BLOOD PRESSURE: 77 MMHG | HEIGHT: 63 IN | WEIGHT: 128 LBS | HEART RATE: 83 BPM

## 2023-01-16 DIAGNOSIS — R92.8 OTHER ABNORMAL AND INCONCLUSIVE FINDINGS ON DIAGNOSTIC IMAGING OF BREAST: ICD-10-CM

## 2023-01-16 DIAGNOSIS — R07.89 CHEST WALL PAIN: ICD-10-CM

## 2023-01-16 DIAGNOSIS — M79.622 AXILLARY PAIN, LEFT: Primary | ICD-10-CM

## 2023-01-16 PROCEDURE — 99213 OFFICE O/P EST LOW 20 MIN: CPT | Performed by: PHYSICIAN ASSISTANT

## 2023-01-16 PROCEDURE — 71101 X-RAY EXAM UNILAT RIBS/CHEST: CPT | Mod: TC | Performed by: RADIOLOGY

## 2023-01-16 NOTE — PROGRESS NOTES
Assessment & Plan     Axillary pain, left  Description consistent with muscular pain. However, with history of extremely dense breast tissue, recommend US for further evaluation  - US Axillary Left; Future  - US Breast Left Limited 1-3 Quadrants; Future    Chest wall pain  XRs reassuring  - XR Ribs & Chest Left G/E 3 Views; Future    Other abnormal and inconclusive findings on diagnostic imaging of breast  H/o extremely dense breasts on previous mammograms. With recent axillary pain, recommend breast US for further evaluation.  - US Breast Left Limited 1-3 Quadrants; Future    Review of the result(s) of each unique test - XR ribs and chest  Ordering of each unique test      No follow-ups on file.    Any Aguilar PA-C  [facility] ELSY Lujan is a 67 year old, presenting for the following health issues:  Pain and Stress (/)    Left axillary pain off and on for about a year. Increased stress (Husb diagnosed with brain CA.)    HPI     Discomfort in L axillary region. Extends somewhat along chest wall/rib region below the axilla  Some aching in L arm  Some pain/tenderness in L scapular region too  No significant numbness or tingling  Some shoveling prior to onset of recent pain  No particular movements are painful  Hasn't felt lump in the axilla or elsewhere in body  Some upper arm tenderness when she presses    Onset 2-3 days ago, but has had similar pain in the past    H/o frozen shoulder years ago (both)    Last mammo in Sept 2022  Has h/o extremely dense breasts. Most recent mammo was 2D  Denies any breast lumps or other breast changes    Concerned because she has a FH of multiple myeloma  Last labs in Sept 2022: no hypercalcemia. Mild anemia. No renal impairment    Review of Systems   Constitutional, HEENT, cardiovascular, pulmonary, gi and gu systems are negative, except as otherwise noted.      Objective    BP (!) 144/92   Pulse 83   Temp 98.4  F (36.9  C) (Oral)    "Ht 1.6 m (5' 3\")   Wt 58.1 kg (128 lb)   LMP 03/01/2004 (Exact Date)   Breastfeeding No   BMI 22.67 kg/m    Body mass index is 22.67 kg/m .  Physical Exam   GENERAL: healthy, alert and no distress  LYMPH: No adenopathy  MS: Mild tenderness to palpation of inferior aspect of L axilla, extending into L lateral chest wall region. Normal ROM of LUE without pain.  SKIN: no suspicious lesions or rashes  PSYCH: mentation appears normal, affect normal/bright    Xray - Reviewed and interpreted by me.  Negative for bony lesions, including lytic lesions. No infiltrates  No results found for this or any previous visit (from the past 24 hour(s)).                "

## 2023-01-17 ENCOUNTER — MYC MEDICAL ADVICE (OUTPATIENT)
Dept: INTERNAL MEDICINE | Facility: CLINIC | Age: 68
End: 2023-01-17
Payer: COMMERCIAL

## 2023-01-18 NOTE — TELEPHONE ENCOUNTER
"Please see Patient's [employer] message regarding 01/16/2023 OV with Any Aguilar.    Routing to covering provider: Last name \"S\"    \"The pain I was experiencing is gone. As I think back and should have told you, I have been walking our rather active, strong dog, pulling her on my left arm. I m thinking I strained by upper arm and scapula. With the XR being negative, I am quite relieved that that is all it was. Do you still want me to do the ultrasound? I do have it scheduled for next week. With my husbands situation it s an extra effort to be gone 1.5 hours for a test. But I will try to get help .\"  "

## 2023-01-19 NOTE — TELEPHONE ENCOUNTER
Covering for Any- It looks like she was concerned about a lymph node or other breast mass causing pain and that was the purpose of the ultrasound.    You could reschedule, and wait for her opinion- I do not feel comfortable saying that it is completely unnecessary.     Mickey Sales, NP

## 2023-01-19 NOTE — TELEPHONE ENCOUNTER
The [facility] [place] message sent to patient/caregiver with Mickey's recommendations. Will wait for response from Any Aguilar as well.    Darcy Bhatia RN  [facility]

## 2023-01-23 ENCOUNTER — HOSPITAL ENCOUNTER (OUTPATIENT)
Dept: ULTRASOUND IMAGING | Facility: CLINIC | Age: 68
Discharge: HOME | End: 2023-01-23
Attending: PHYSICIAN ASSISTANT
Payer: COMMERCIAL

## 2023-01-23 ENCOUNTER — HOSPITAL ENCOUNTER (OUTPATIENT)
Dept: MAMMOGRAPHY | Facility: CLINIC | Age: 68
Discharge: HOME | End: 2023-01-23
Attending: PHYSICIAN ASSISTANT
Payer: COMMERCIAL

## 2023-01-23 DIAGNOSIS — M79.622 AXILLARY PAIN, LEFT: ICD-10-CM

## 2023-01-23 DIAGNOSIS — R92.8 OTHER ABNORMAL AND INCONCLUSIVE FINDINGS ON DIAGNOSTIC IMAGING OF BREAST: ICD-10-CM

## 2023-01-23 PROCEDURE — 76642 ULTRASOUND BREAST LIMITED: CPT | Mod: LT

## 2023-01-23 PROCEDURE — 77061 BREAST TOMOSYNTHESIS UNI: CPT | Mod: LT

## 2023-01-25 ENCOUNTER — MYC MEDICAL ADVICE (OUTPATIENT)
Dept: INTERNAL MEDICINE | Facility: CLINIC | Age: 68
End: 2023-01-25
Payer: COMMERCIAL

## 2023-01-25 DIAGNOSIS — G47.00 INSOMNIA, UNSPECIFIED TYPE: ICD-10-CM

## 2023-01-25 DIAGNOSIS — F41.9 ANXIETY: Primary | ICD-10-CM

## 2023-01-26 RX ORDER — MIRTAZAPINE 15 MG/1
15 TABLET, FILM COATED ORAL AT BEDTIME
Qty: 30 TABLET | Refills: 1 | Status: SHIPPED | OUTPATIENT
Start: 2023-01-26 | End: 2023-02-02 | Stop reason: SINTOL

## 2023-01-26 NOTE — TELEPHONE ENCOUNTER
Please see patient's [employer] message below regarding anxiety.    Last office visit 1/16/23    Please advise, thanks.    (Slicethepiet message sent to patient asking for pharmacy info.)

## 2023-01-30 ENCOUNTER — TELEPHONE (OUTPATIENT)
Dept: INTERNAL MEDICINE | Facility: CLINIC | Age: 68
End: 2023-01-30
Payer: COMMERCIAL

## 2023-01-30 DIAGNOSIS — G47.00 INSOMNIA, UNSPECIFIED TYPE: ICD-10-CM

## 2023-01-30 DIAGNOSIS — F41.9 ANXIETY: ICD-10-CM

## 2023-01-30 NOTE — TELEPHONE ENCOUNTER
Patient calls, Dr. Sherwood started her on mirtazapine last week. Patient took 1/2 tab on Friday night, she was able to sleep, but had very distressing dreams about her  all night. She decided not to take on Saturday, but took another 1/2 tab last night. She was able to sleep for about 3.5 hours and then was not able to go back to sleep. Patient is not sure if she wants to continue to take this medication since it didn't help her with sleep or if Dr. Sherwood wants her to allow more time.    Patient aware provider is out of the office today. She has meeting and appointments for patient on Tuesday, please call either before 10:30 or after 3 pm.    Darcy Bhatia RN  [facility]

## 2023-02-01 NOTE — TELEPHONE ENCOUNTER
Wellbutrin is not very effective for anxiety.  Mirtazapine is a very effective antianxiety medication and it also helps with good sleep.  I have not sure that the dreams had anything to do with the medication.  She did not seem to have bad dreams the second night, just did not sleep through the night which is probably dose-related which is why I want her to try to increase.  It may take some days for her to have some effect from the medication and start sleeping better but I think it will be helpful.  When she having some other side effects that she was not mentioning?  She really does not want to go with this, then a better choice would be to use Lexapro which is better for anxiety than Wellbutrin.

## 2023-02-01 NOTE — TELEPHONE ENCOUNTER
Call to pt and advised. She states she had 2 bad experiences on this medication. She doesn't think she will continue it at this time.     The first night was the bad dreams.     The second night she slept from 10 pm and woke up at 1:30 and was up the rest of the night.     She is not able to deal with side effects. She doesn't like how she felt taking it overall.     She is asking if she could start on an anti-anxiety pill like Wellbutrin. She states she took this before for a short time, and tolerated it.   If OK, please fax to [facility]. No call back needed.

## 2023-02-01 NOTE — TELEPHONE ENCOUNTER
Attempted to contact pt. Left message to call clinic.     Pt was pretty adamant about not continuing the Mirtazipine.     Will see if would like to try Lexapro for anxiety instead of Wellbutrin.

## 2023-02-02 RX ORDER — ESCITALOPRAM OXALATE 10 MG/1
TABLET ORAL
Qty: 30 TABLET | Refills: 1 | Status: SHIPPED | OUTPATIENT
Start: 2023-02-02 | End: 2023-02-14

## 2023-02-02 NOTE — TELEPHONE ENCOUNTER
Patient calls back. Informed of message from Dr. Sherwood. Patient would prefer to try Lexapro, please sent prescription to pharmacy.     Darcy Bhatia RN  [facility]

## 2023-02-13 ENCOUNTER — TELEPHONE (OUTPATIENT)
Dept: INTERNAL MEDICINE | Facility: CLINIC | Age: 68
End: 2023-02-13
Payer: COMMERCIAL

## 2023-02-13 DIAGNOSIS — F41.9 ANXIETY: ICD-10-CM

## 2023-02-13 NOTE — TELEPHONE ENCOUNTER
"Patient asking to be seen in clinic by primary care provider Tues. 2/14 for the following reasons:    1. Rash on and off over past 3 weeks mid chest  between breasts. Denies itching or pain, does burn slightly when applying 1% Hydrocortisone with Aloe. Seems to return after episodes of night sweats. Reddened spots, not raised, largest about 1/4 inche diameter.  There is one under a breast also.  States she has had intermittent night sweats for years but also knows it can be a side effect of new med Escitalopram.  2.  Patient has had severe anxiety since end of Oct. 2022 as  has a brain tumor. Started Escitalopram 2/2/23, still struggles with sleep some nights and feels that is overall the same as before starting Escitalopram.   She sometimes needs to take a Lorazepam due to \"nerves, feeling shaky and on edge\".    Weight down almost 10 lb. Since end of October.  She is trying to be more aware of eating and drinking regularly.  Denies nausea.      Would like to be seen in clinic if possible Tues. Patient knows primary care provider is not in office today. BERNA Alcazar R.N.    "

## 2023-02-14 RX ORDER — ESCITALOPRAM OXALATE 10 MG/1
20 TABLET ORAL DAILY
Qty: 60 TABLET | Refills: 1 | COMMUNITY
Start: 2023-02-14 | End: 2023-02-22 | Stop reason: DRUGHIGH

## 2023-02-14 NOTE — TELEPHONE ENCOUNTER
The rash is probably yeast.  Recommend she use over-the-counter clotrimazole cream.  Recommend she increase her Lexapro to 20 mg a day and for now can continue the lorazepam.

## 2023-02-14 NOTE — TELEPHONE ENCOUNTER
Patient calls again on status of being seen today. 2- There is an approval slot today with Dr Sherwood at 4. Can this patient be booked there?     She shared that her spouse has a brain tumor and trying to help him but is struggling with anxiety. She said the lexapro is not kicking in. Patient was teary ion phone     Patient number [phone]

## 2023-02-14 NOTE — TELEPHONE ENCOUNTER
Pt states she has Valium 5 mg tablets. She 7 tablets left from her vertigo problems.     Last night she fell asleep at 9:15 pm and then woke up at 1 am. She has been up since.   She took a Lorazepam at 5 AM.   Has been up since.   She feels anxious.     She will start the Lexapro 20 mg daily tonight. Rx pended historical with increase.     She is asking if she wakes up in the middle of the night if she can take a Valium.     She takes a Lorazepam in the afternoon. She won't take one in the evening.   She agrees with a counselor.

## 2023-02-14 NOTE — TELEPHONE ENCOUNTER
Recommend she stay with lorazepam and take one at bedtime. Valium causes more problems with slower reflexes longer than lorazepam.

## 2023-02-14 NOTE — TELEPHONE ENCOUNTER
I think I need to refer her to a counselor to deal with this.  In the meantime, increase the Lexapro as per my message and continue on lorazepam for now until the Lexapro is working.  I am not sure there is anything differently that I would do at an appointment.  If she absolutely insists you can use that spot for her.

## 2023-02-17 ENCOUNTER — MYC MEDICAL ADVICE (OUTPATIENT)
Dept: INTERNAL MEDICINE | Facility: CLINIC | Age: 68
End: 2023-02-17
Payer: COMMERCIAL

## 2023-02-17 DIAGNOSIS — F41.9 ANXIETY: Primary | ICD-10-CM

## 2023-02-18 ENCOUNTER — MYC MEDICAL ADVICE (OUTPATIENT)
Dept: INTERNAL MEDICINE | Facility: CLINIC | Age: 68
End: 2023-02-18
Payer: COMMERCIAL

## 2023-02-18 DIAGNOSIS — G47.00 INSOMNIA, UNSPECIFIED TYPE: Primary | ICD-10-CM

## 2023-02-21 NOTE — TELEPHONE ENCOUNTER
Dr Sherwood out of office until Thursday.     Will route to covering provider. See her initial message on Saturday. She is asking for something for sleep.

## 2023-02-22 RX ORDER — TRAZODONE HYDROCHLORIDE 50 MG/1
50 TABLET, FILM COATED ORAL AT BEDTIME
Qty: 30 TABLET | Refills: 1 | Status: SHIPPED | OUTPATIENT
Start: 2023-02-22 | End: 2023-09-19

## 2023-02-22 RX ORDER — ESCITALOPRAM OXALATE 20 MG/1
20 TABLET ORAL DAILY
Qty: 90 TABLET | Refills: 1 | Status: SHIPPED | OUTPATIENT
Start: 2023-02-22 | End: 2023-08-28

## 2023-02-23 ENCOUNTER — MYC MEDICAL ADVICE (OUTPATIENT)
Dept: INTERNAL MEDICINE | Facility: CLINIC | Age: 68
End: 2023-02-23
Payer: COMMERCIAL

## 2023-02-23 DIAGNOSIS — R21 RASH: Primary | ICD-10-CM

## 2023-02-24 NOTE — TELEPHONE ENCOUNTER
See her most recent message.      I tried 1% hydrocortisone before calling and used it for 1 week before switching to the anti-fungal    Referral pended.

## 2023-03-06 ENCOUNTER — MYC MEDICAL ADVICE (OUTPATIENT)
Dept: INTERNAL MEDICINE | Facility: CLINIC | Age: 68
End: 2023-03-06
Payer: COMMERCIAL

## 2023-03-07 ENCOUNTER — MYC MEDICAL ADVICE (OUTPATIENT)
Dept: INTERNAL MEDICINE | Facility: CLINIC | Age: 68
End: 2023-03-07
Payer: COMMERCIAL

## 2023-03-07 DIAGNOSIS — M81.0 OSTEOPOROSIS WITHOUT CURRENT PATHOLOGICAL FRACTURE, UNSPECIFIED OSTEOPOROSIS TYPE: Primary | ICD-10-CM

## 2023-03-07 DIAGNOSIS — T50.905S UNSPECIFIED ADVERSE EFFECT OF DRUG OR MEDICAMENT, SEQUELA: ICD-10-CM

## 2023-03-08 NOTE — TELEPHONE ENCOUNTER
Pt had last Prolia injection through the Infusion center on 7/20/22.     Please advise for lab work, orders through therapy plan.     Lab Results   Component Value Date    KENDELL 8.5 09/27/2022    KENDELL 9.0 04/08/2021

## 2023-03-10 NOTE — TELEPHONE ENCOUNTER
Go to the Prolia smartset.  Pended medication.           SmartSets     Search for new SmartSetAdd      Search Results              []PROLIA  RISK EVALUATION AND MITIGATION STRATEGY

## 2023-03-14 NOTE — TELEPHONE ENCOUNTER
Pt gets this done through Infusion Center.     Pended orders through Admit/therapy tab.   There is Stop sign for Albumin lab draw, if needed. Can uncheck if not needed.

## 2023-03-14 NOTE — TELEPHONE ENCOUNTER
Unless her insurance requires her to get it at the infusion center, please have her get it done here.  That would does not take up spots at the infusion center.

## 2023-03-16 NOTE — TELEPHONE ENCOUNTER
Scheduled pt for Prolia shot in 2 weeks.     [employer] informed her that it will be covered once PA done.

## 2023-03-16 NOTE — TELEPHONE ENCOUNTER
Patient was transferred to [facility] from central scheduling for a Prolia injection in [place] .     Routing to [place] triage-Please call back and assist the patient in scheduling a nurse only appointment in [place] .    Prolia orders have already been placed.     Nella BHAKTA RN   [facility] Triage

## 2023-03-25 ENCOUNTER — MYC MEDICAL ADVICE (OUTPATIENT)
Dept: INTERNAL MEDICINE | Facility: CLINIC | Age: 68
End: 2023-03-25
Payer: COMMERCIAL

## 2023-03-27 ENCOUNTER — NURSE TRIAGE (OUTPATIENT)
Dept: INTERNAL MEDICINE | Facility: CLINIC | Age: 68
End: 2023-03-27
Payer: COMMERCIAL

## 2023-03-27 NOTE — TELEPHONE ENCOUNTER
Nurse Triage SBAR    Is this a 2nd Level Triage? NO    Situation: Patient sends my chart message.    Background: Recently started on Lexapro. Is feeling dizzy, with a headache at times. Dizziness happens when turning head.  Sleep has been poor lately, see previous notes.   Patient occasionally will take Lorazepam PRN to help with sleep.  States she eats ok, and has good fluid intake.  Assessment: Possible side effect of Lexapro.    Protocol Recommended Disposition:   No disposition on file.    Recommendation: will forward to PCP.     Routed to provider    Does the patient meet one of the following criteria for ADS visit consideration? 16+ years old, with an [facility] PCP     TIP  Providers, please consider if this condition is appropriate for management at one of our Acute and Diagnostic Services sites.     If patient is a good candidate, please use dotphrase <dot>triageresponse and select Refer to ADS to document.    Reason for Disposition    MILD dizziness (e.g., walking normally) and has NOT been evaluated by physician for this (Exception: dizziness caused by heat exposure, sudden standing, or poor fluid intake)    Additional Information    Negative: SEVERE difficulty breathing (e.g., struggling for each breath, speaks in single words)    Negative: Shock suspected (e.g., cold/pale/clammy skin, too weak to stand, low BP, rapid pulse)    Negative: Difficult to awaken or acting confused (e.g., disoriented, slurred speech)    Negative: Fainted, and still feels dizzy afterwards    Negative: Overdose (accidental or intentional) of medications    Negative: New neurologic deficit that is present now: * Weakness of the face, arm, or leg on one side of the body * Numbness of the face, arm, or leg on one side of the body * Loss of speech or garbled speech    Negative: Heart beating < 50 beats per minute OR > 140 beats per minute    Negative: Sounds like a life-threatening emergency to the triager    Negative: Chest pain     Negative: Rectal bleeding, bloody stool, or tarry-black stool    Negative: Vomiting is main symptom    Negative: Diarrhea is main symptom    Negative: Headache is main symptom    Negative: Heat exhaustion suspected (i.e., dehydration from heat exposure)    Negative: Patient states that they are having an anxiety or panic attack    Negative: Dizziness from low blood sugar (i.e., < 60 mg/dl or 3.5 mmol/l)    Negative: SEVERE dizziness (e.g., unable to stand, requires support to walk, feels like passing out now)    Negative: SEVERE headache or neck pain    Negative: Spinning or tilting sensation (vertigo) present now and one or more stroke risk factors (i.e., hypertension, diabetes mellitus, prior stroke/TIA, heart attack, age over 60) (Exception: prior physician evaluation for this AND no different/worse than usual)    Negative: Neurologic deficit that was brief (now gone), ANY of the following:* Weakness of the face, arm, or leg on one side of the body* Numbness of the face, arm, or leg on one side of the body* Loss of speech or garbled speech    Negative: Loss of vision or double vision  (Exception: Similar to previous migraines.)    Negative: Extra heart beats OR irregular heart beating (i.e., 'palpitations')    Negative: Difficulty breathing    Negative: Drinking very little and has signs of dehydration (e.g., no urine > 12 hours, very dry mouth, very lightheaded)    Negative: Follows bleeding (e.g., stomach, rectum, vagina)  (Exception: Became dizzy from sight of small amount blood.)    Negative: Patient sounds very sick or weak to the triager    Negative: Lightheadedness (dizziness) present now, after 2 hours of rest and fluids    Negative: Spinning or tilting sensation (vertigo) present now    Negative: Fever > 103 F (39.4 C)    Negative: Fever > 100.0 F (37.8 C) and has diabetes mellitus or a weak immune system (e.g., HIV positive, cancer chemotherapy, organ transplant, splenectomy, chronic steroids)     "Negative: Taking a medicine that could cause dizziness (e.g., blood pressure medications, diuretics)    Negative: MODERATE dizziness (e.g., interferes with normal activities) (Exception: dizziness caused by heat exposure, sudden standing, or poor fluid intake)    Negative: Vomiting occurs with dizziness    Negative: Patient wants to be seen    Answer Assessment - Initial Assessment Questions  1. DESCRIPTION: \"Describe your dizziness.\"      Comes on in morning, head turning while backout of car. Happens when she feels tired.   2. LIGHTHEADED: \"Do you feel lightheaded?\" (e.g., somewhat faint, woozy, weak upon standing)      Dizzy/woozy  3. VERTIGO: \"Do you feel like either you or the room is spinning or tilting?\" (i.e. vertigo)      no  4. SEVERITY: \"How bad is it?\"  \"Do you feel like you are going to faint?\" \"Can you stand and walk?\"    - MILD: Feels slightly dizzy, but walking normally.    - MODERATE: Feels unsteady when walking, but not falling; interferes with normal activities (e.g., school, work).    - SEVERE: Unable to walk without falling, or requires assistance to walk without falling; feels like passing out now.       mild  5. ONSET:  \"When did the dizziness begin?\"      3-5 days ago  6. AGGRAVATING FACTORS: \"Does anything make it worse?\" (e.g., standing, change in head position)      Change in head position  7. HEART RATE: \"Can you tell me your heart rate?\" \"How many beats in 15 seconds?\"  (Note: not all patients can do this)        68  8. CAUSE: \"What do you think is causing the dizziness?\"      Possibly Lexapro  9. 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  10. OTHER SYMPTOMS: \"Do you have any other symptoms?\" (e.g., fever, chest pain, vomiting, diarrhea, bleeding)        Headache in AM 2-3 times a week.  11. PREGNANCY: \"Is there any chance you are pregnant?\" " "\"When was your last menstrual period?\"        n/a    Protocols used: DIZZINESS-A-OH      "

## 2023-03-28 ENCOUNTER — MYC MEDICAL ADVICE (OUTPATIENT)
Dept: INTERNAL MEDICINE | Facility: CLINIC | Age: 68
End: 2023-03-28
Payer: COMMERCIAL

## 2023-03-28 NOTE — TELEPHONE ENCOUNTER
Her escitalopram dose was increased from 10-20 on February 14. Usually side effects from this medication would be more likely to occur when the dose was first increased and go away with time rather than come on after several weeks.    Get more information about what the dizziness is like: Is it vertigo, off-balance, faint feeling? If it happens only with turning the head and is brief, it is most likely an inner ear problem, not a medication problem.      Get more information about what is going on with her sleep.  Is she taking the trazodone at night?  What dose?

## 2023-03-28 NOTE — TELEPHONE ENCOUNTER
Her dizziness is probably inner ear and will probably get better soon.  Recommend she continue on the Lexapro at the current dose.

## 2023-03-28 NOTE — TELEPHONE ENCOUNTER
"Called patient and relayed message to patient.  She verbalized understanding.      Stated she sent a myc message today with an update.   Discussed her dizziness and she stated she feels a bit off balance intermittently, but is not needing to grab anything or hang onto anything. Stated that the dizziness is not stopping her from doing things.  Has been walking her dog without problems.  Stated she feels better after eating.  Denied tilting, spinning sensation, feeling light headed or faint.  Denied any dizziness with turning her head in bed.  This sensation doesn't last very long and goes away.  She is going to get her blood pressure checked to see if its running low.      Stated she has been sleeping better lately and has not had to take the lorazepam lately as the \"heavy anxiety\" is better.    Patient not taking the trazodone as she didn't think it was helping her.  Patient thinks she had too much anxiety at that time for the medication to help her to sleep.  Patient increased dose to 50 and woke up with dizziness and a headache and is not wanting to take it anymore.     "

## 2023-03-28 NOTE — TELEPHONE ENCOUNTER
Patient advised of MD response.  She will call back with any new or worsening symptoms. BERNA Alcazar R.N.

## 2023-03-31 DIAGNOSIS — Z92.29 PERSONAL HISTORY OF OTHER DRUG THERAPY: ICD-10-CM

## 2023-04-04 ENCOUNTER — MYC MEDICAL ADVICE (OUTPATIENT)
Dept: INTERNAL MEDICINE | Facility: CLINIC | Age: 68
End: 2023-04-04

## 2023-04-04 ENCOUNTER — ALLIED HEALTH/NURSE VISIT (OUTPATIENT)
Dept: NURSING | Facility: CLINIC | Age: 68
End: 2023-04-04
Payer: COMMERCIAL

## 2023-04-04 DIAGNOSIS — F43.0 ACUTE REACTION TO STRESS: ICD-10-CM

## 2023-04-04 DIAGNOSIS — M81.0 OSTEOPOROSIS: Primary | ICD-10-CM

## 2023-04-04 PROCEDURE — 96372 THER/PROPH/DIAG INJ SC/IM: CPT | Performed by: INTERNAL MEDICINE

## 2023-04-04 PROCEDURE — 99207 PR NO CHARGE NURSE ONLY: CPT | Performed by: INTERNAL MEDICINE

## 2023-04-04 RX ORDER — LORAZEPAM 0.5 MG/1
0.5 TABLET ORAL EVERY 8 HOURS PRN
Qty: 30 TABLET | Refills: 2 | Status: SHIPPED | OUTPATIENT
Start: 2023-04-04 | End: 2023-09-19

## 2023-04-04 NOTE — PROGRESS NOTES
Clinic Administered Medication Documentation      Prolia Documentation    Indication: Prolia  (denosumab) is a prescription medicine used to treat osteoporosis in patients who:     Are at high risk for fracture, meaning patients who have had a fracture related to osteoporosis, or who have multiple risk factors for fracture.    Cannot use another osteoporosis medicine or other osteoporosis medicines did not work well.    The timeline for early/late injections would be 4 weeks early and any time after the 6 month nina. If a patient receives their injection late, then the subsequent injection would be 6 months from the date that they actually received the injection.    When was the last injection?  22  Was the last injection at least 6 months ago? Yes  Has the prior authorization been completed?  Yes  Is there an active order (written within the past 365 days, with administrations remaining, not ) in the chart?  Yes  Patient denies any dental work involving the bone (e.g. tooth extraction or dental implants) in the past 4 weeks?  Yes  Patient denies plans for any dental work involving the bone (e.g. tooth extraction or dental implants) in the next 4 weeks? Yes    The following steps were completed to comply with the REMS program for Prolia:    Reviewed information in the Medication Guide and Patient Counseling Chart, including the serious risks of Prolia  and the symptoms of each risk.    Advised patient to seek prompt medical attention if they have signs or symptoms of any of the serious risks.    Provided each patient a copy of the Medication Guide and Patient Brochure.    REVIEWED ABNORMAL CALCIUM RESULTS WITH DR MARTE. SHE GAVE VERBAL ORDER TO GIVE PROLIA.     Prior to injection, verified patient identity using patient's name and date of birth. Medication was administered. Please see MAR and medication order for additional information. Patient instructed to remain in clinic for 15 minutes and report any  adverse reaction to staff immediately.    Vial/Syringe: Single dose vial. Was entire vial of medication used? Yes  Was this medication supplied by the patient? No

## 2023-05-17 ENCOUNTER — OFFICE VISIT (OUTPATIENT)
Dept: PODIATRY | Facility: CLINIC | Age: 68
End: 2023-05-17
Payer: COMMERCIAL

## 2023-05-17 VITALS — WEIGHT: 128 LBS | BODY MASS INDEX: 22.67 KG/M2 | SYSTOLIC BLOOD PRESSURE: 124 MMHG | DIASTOLIC BLOOD PRESSURE: 80 MMHG

## 2023-05-17 DIAGNOSIS — M79.671 RIGHT FOOT PAIN: Primary | ICD-10-CM

## 2023-05-17 DIAGNOSIS — L60.0 INGROWN NAIL OF GREAT TOE OF RIGHT FOOT: ICD-10-CM

## 2023-05-17 PROCEDURE — 11730 AVULSION NAIL PLATE SIMPLE 1: CPT | Mod: T5 | Performed by: PODIATRIST

## 2023-05-17 PROCEDURE — 99203 OFFICE O/P NEW LOW 30 MIN: CPT | Mod: 25 | Performed by: PODIATRIST

## 2023-05-17 NOTE — LETTER
5/17/2023         RE: Le Vora  [address] [address] [place] [id number]        Dear Colleague,    Thank you for referring your patient, Le Vora, to the [facility] PODIATRY. Please see a copy of my visit note below.    Podiatry / Foot and Ankle Surgery Progress Note    May 17, 2023    Subject: Patient was seen for painful right great toenail.  Has been going on for about 2 weeks.  Usually she cuts out the side and states that it does okay but this 1 has not been going away.  Pain can be 3 out of 10 at its worst.  Denies specific injury.  Wondering what can be done for the pain.  Denies fever, nausea, vomiting.    Objective:  Vitals: /80   Wt 58.1 kg (128 lb)   LMP 03/01/2004 (Exact Date)   BMI 22.67 kg/m    BMI= Body mass index is 22.67 kg/m .    General:  Patient is alert and orientated.  NAD.    Vascular:  DP and PT pulses are palpable.  No edema or varicosities noted.  CFT's < 3secs.  Skin temp is normal.    Neuro:  Light and gross touch sensation intact to digits, dorsum, and plantar aspects of the feet.    Derm: medial border of the right great toe is incurvated.  Localized redness and pain on palpation.    Musculoskeletal:  No foot deformity noted.      Assessment:    Right foot pain  Ingrown nail of great toe of right foot    Medical Decision Making/Plan:  The potential causes and nature of an ingrown toenail were discussed with the patient.  We reviewed the natural history/prognosis of the condition and potential risks if no treatment is provided.      Treatment options discussed included conservative management (oral antibiotics, soaking of foot, adequate width shoes)  as well as surgical management (partial or total nail removal).  The pros and cons of both forms of treatment were reviewed.      After thorough discussion and answering all questions, the patient elected to have the border removed.  We will have her soak the foot twice a day for 2  weeks and apply antibiotic ointment and a bandage.  All questions were answered to patient's satisfaction she will call further questions or concerns.    Procedure: After verbal consent, the right big toe was anesthetized with 5cc's of 1% lidocaine plain. A tourniquet was applied to the toe. The medial border was then raised from the nail bed and then cut the length of the nail.  The offending nail border was then removed.   Bacitracin was applied to the nail bed.  The tourniquet was removed.  Bandage was applied to the toe.  The patient tolerated the procedure and anesthesia well.    Patient Risk Factor:  Patient is a low risk factor for infection.     Etelvina Matias DPM, Podiatry/Foot and Ankle Surgery        Again, thank you for allowing me to participate in the care of your patient.        Sincerely,        Etelvina Matias DPM, Podiatry/Foot and Ankle Surgery

## 2023-05-17 NOTE — PATIENT INSTRUCTIONS
Thank you for choosing [facility] Podiatry / Foot & Ankle Surgery!    DR MARINELLI'S CLINIC:  [facility]   [address]   [place] [address]      TRIAGE LINE: [phone]  APPOINTMENTS: [phone]  RADIOLOGY: [phone]  SET UP SURGERY: [phone]  PHYSICAL THERAPY: [phone]   FAX NUMBER: [phone]  BILLING QUESTIONS: [phone]       Follow up: As needed      INGROWN TOENAILS  When a toenail is ingrown, it is curved and grows into the skin, usually at the nail borders (the sides of the nail). This  digging in  of the nail irritates the skin, often creating pain, redness, swelling, and warmth in the toe.  If an ingrown nail causes a break in the skin, bacteria may enter and cause an infection in the area, which is often marked by drainage and a foul odor. However, even if the toe isn t painful, red, swollen, or warm, a nail that curves downward into the skin can progress to an infection.  CAUSES:  Heredity: In many people, the tendency for ingrown toenails is inherited.   Trauma: Sometimes an ingrown toenail is the result of trauma, such as stubbing your toe, having an object fall on your toe, or engaging in activities that involve repeated pressure on the toes, such as kicking or running.   Improper Trimming:  The most common cause of ingrown toenails is cutting your nails too short. This encourages the skin next to the nail to fold over the nail.   Improperly Sized Footwear: Ingrown toenails can result from wearing socks and shoes that are tight or short.   Nail Conditions: Ingrown toenails can be caused by nail problems, such as fungal infections or losing a nail due to trauma.   TREATMENT: Sometimes initial treatment for ingrown toenails can be safely performed at home. However, home treatment is strongly discouraged if an infection is suspected, or for those who have medical conditions that put feet at high risk, such as diabetes, nerve damage in the foot, or poor  circulation.  Home care: If you don t have an infection or any of the above medical conditions, you can soak your foot in room-temperature water (adding Epsom s salt may be recommended by your doctor), and gently massage the side of the nail fold to help reduce the inflammation.  Avoid attempting  bathroom surgery.  Repeated cutting of the nail can cause the condition to worsen over time. If your symptoms fail to improve, it s time to see a foot and ankle surgeon.  Physician care: After examining the toe, the foot and ankle surgeon will select the treatment best suited for you. If an infection is present, an oral antibiotic may be prescribed.  Sometimes a minor surgical procedure, often performed in the office, will ease the pain and remove the offending nail. After applying a local anesthetic, the doctor removes part of the nail s side border. Some nails may become ingrown again, requiring removal of the nail root.  Following the nail procedure, a light bandage will be applied. Most people experience very little pain after surgery and may resume normal activity the next day. If your surgeon has prescribed an oral antibiotic, be sure to take all the medication, even if your symptoms have improved.  PREVENTION:  Proper Trimming: Cut toenails in a fairly straight line, and don t cut them too short. You should be able to get your fingernail under the sides and end of the nail.   Well-fitting Footwear: Don t wear shoes that are short or tight in the toe area. Avoid shoes that are loose, because they too cause pressure on the toes, especially when running or walking briskly.     INGROWN TOENAIL REMOVAL AFTERCARE   Go directly home and elevate the affected foot on one or two pillows for the remainder of the day/evening if possible. Your toe may stay numb anywhere from 2-8 hours.   Take Tylenol, ibuprofen or another anti-inflammatory as needed for pain.   Take antibiotic if that has been prescribed. Finish the entire  prescribed antibiotic even if your symptoms have improved.   The evening of the procedure, soak/wash the affected area in warm water (you may add Epsom salt) for 5 to 10 minutes. Do this twice a day for 2-4 weeks (6-8 weeks if you had phenol) (you may count showering/bathing as one soak).  After soaks, pat the area dry and then allow to airdry for a few minutes. Apply antibiotic ointment to the area and cover with 2 X 2 gauze and paper tape or band-aid.  You may pursue everyday activities as tolerated with either an open toe shoe or cut-out shoe as needed or you may wear regular shoes if no pain is noted.  Watch for any signs and symptoms of infection such as: redness, red streaks going up the foot/leg, swelling, pus or foul odor. Those that have had the phenol procedure, the toe will drain longer and will look like it is infected because it is a chemical burn.   Please call with questions.

## 2023-05-17 NOTE — PROGRESS NOTES
Podiatry / Foot and Ankle Surgery Progress Note    May 17, 2023    Subject: Patient was seen for painful right great toenail.  Has been going on for about 2 weeks.  Usually she cuts out the side and states that it does okay but this 1 has not been going away.  Pain can be 3 out of 10 at its worst.  Denies specific injury.  Wondering what can be done for the pain.  Denies fever, nausea, vomiting.    Objective:  Vitals: /80   Wt 58.1 kg (128 lb)   LMP 03/01/2004 (Exact Date)   BMI 22.67 kg/m    BMI= Body mass index is 22.67 kg/m .    General:  Patient is alert and orientated.  NAD.    Vascular:  DP and PT pulses are palpable.  No edema or varicosities noted.  CFT's < 3secs.  Skin temp is normal.    Neuro:  Light and gross touch sensation intact to digits, dorsum, and plantar aspects of the feet.    Derm: medial border of the right great toe is incurvated.  Localized redness and pain on palpation.    Musculoskeletal:  No foot deformity noted.      Assessment:    Right foot pain  Ingrown nail of great toe of right foot    Medical Decision Making/Plan:  The potential causes and nature of an ingrown toenail were discussed with the patient.  We reviewed the natural history/prognosis of the condition and potential risks if no treatment is provided.      Treatment options discussed included conservative management (oral antibiotics, soaking of foot, adequate width shoes)  as well as surgical management (partial or total nail removal).  The pros and cons of both forms of treatment were reviewed.      After thorough discussion and answering all questions, the patient elected to have the border removed.  We will have her soak the foot twice a day for 2 weeks and apply antibiotic ointment and a bandage.  All questions were answered to patient's satisfaction she will call further questions or concerns.    Procedure: After verbal consent, the right big toe was anesthetized with 5cc's of 1% lidocaine plain. A tourniquet  was applied to the toe. The medial border was then raised from the nail bed and then cut the length of the nail.  The offending nail border was then removed.   Bacitracin was applied to the nail bed.  The tourniquet was removed.  Bandage was applied to the toe.  The patient tolerated the procedure and anesthesia well.    Patient Risk Factor:  Patient is a low risk factor for infection.     Etelvina Matias DPM, Podiatry/Foot and Ankle Surgery

## 2023-05-23 ENCOUNTER — OFFICE VISIT (OUTPATIENT)
Dept: URGENT CARE | Facility: URGENT CARE | Age: 68
End: 2023-05-23
Payer: COMMERCIAL

## 2023-05-23 VITALS
DIASTOLIC BLOOD PRESSURE: 72 MMHG | HEART RATE: 66 BPM | WEIGHT: 128 LBS | SYSTOLIC BLOOD PRESSURE: 120 MMHG | OXYGEN SATURATION: 99 % | BODY MASS INDEX: 22.67 KG/M2 | RESPIRATION RATE: 14 BRPM | TEMPERATURE: 98.1 F

## 2023-05-23 DIAGNOSIS — J01.00 ACUTE NON-RECURRENT MAXILLARY SINUSITIS: Primary | ICD-10-CM

## 2023-05-23 PROCEDURE — 99213 OFFICE O/P EST LOW 20 MIN: CPT | Performed by: PHYSICIAN ASSISTANT

## 2023-05-23 RX ORDER — AZITHROMYCIN 250 MG/1
TABLET, FILM COATED ORAL
Qty: 6 TABLET | Refills: 0 | Status: SHIPPED | OUTPATIENT
Start: 2023-05-23 | End: 2023-05-28

## 2023-05-23 NOTE — PROGRESS NOTES
Assessment & Plan     Acute non-recurrent maxillary sinusitis  Symptoms and exam suggest.  Patient had one episode of diarrhea today and would prefer not to take Augmentin.  Zithromax is prescribed today. Take with probiotics.  Continue Tylenol/ Motrin as needed for pain/discomfort.  Keep monitoring symptoms.  Follow-up if any worsening symptoms.  Patient agrees with the plan.  - azithromycin (ZITHROMAX) 250 MG tablet  Dispense: 6 tablet; Refill: 0      Return in about 1 week (around 5/30/2023) for Symptoms failing to improve.    DEMARCO Silverman [facility] CARE JOHNNY Lujan is a 67 year old female who presents to clinic today for the following health issues:  Chief Complaint   Patient presents with     Sinus Problem     Possible sinus infection- has pressure, sinus issue, bodyaches, phlemy yellow cough, HA x 10 days -- took advil today     HPI      URI Adult    Onset of symptoms was 10 day(s) ago.  Course of illness is worsening.    Severity moderate  Current and Associated symptoms: facial pain/pressure, headache. Patient also reports one episode of diarrhea today.  No fever/CP/SOB.  Treatment measures tried include Tylenol/Ibuprofen.  Predisposing factors include None.  Patient reports negative home Covid test      Review of Systems  Constitutional, HEENT, cardiovascular, pulmonary, GI, , musculoskeletal, neuro, skin, endocrine and psych systems are negative, except as otherwise noted.      Objective    /72 (BP Location: Right arm, Patient Position: Chair, Cuff Size: Adult Regular)   Pulse 66   Temp 98.1  F (36.7  C) (Oral)   Resp 14   Wt 58.1 kg (128 lb)   LMP 03/01/2004 (Exact Date)   SpO2 99%   BMI 22.67 kg/m    Physical Exam   GENERAL: healthy, alert and no distress  HENT: ear canals and TM's normal, nose with boggy turbinates, TTP left maxillary sinus, mouth without ulcers or lesions  RESP: lungs clear to auscultation - no rales, rhonchi or  wheezes  CV: regular rate and rhythm, normal S1 S2  MS: no gross musculoskeletal defects noted, no edema  SKIN: no suspicious lesions or rashes

## 2023-07-26 ENCOUNTER — PATIENT OUTREACH (OUTPATIENT)
Dept: CARE COORDINATION | Facility: CLINIC | Age: 68
End: 2023-07-26
Payer: COMMERCIAL

## 2023-08-09 ENCOUNTER — PATIENT OUTREACH (OUTPATIENT)
Dept: CARE COORDINATION | Facility: CLINIC | Age: 68
End: 2023-08-09
Payer: COMMERCIAL

## 2023-08-28 ENCOUNTER — MYC MEDICAL ADVICE (OUTPATIENT)
Dept: INTERNAL MEDICINE | Facility: CLINIC | Age: 68
End: 2023-08-28
Payer: COMMERCIAL

## 2023-08-28 DIAGNOSIS — F41.9 ANXIETY: ICD-10-CM

## 2023-08-28 RX ORDER — ESCITALOPRAM OXALATE 20 MG/1
20 TABLET ORAL DAILY
Qty: 30 TABLET | Refills: 0 | Status: SHIPPED | OUTPATIENT
Start: 2023-08-28 | End: 2023-09-19

## 2023-08-30 DIAGNOSIS — I10 BENIGN ESSENTIAL HYPERTENSION: ICD-10-CM

## 2023-08-30 DIAGNOSIS — E03.9 ACQUIRED HYPOTHYROIDISM: ICD-10-CM

## 2023-08-31 RX ORDER — LEVOTHYROXINE SODIUM 100 UG/1
100 TABLET ORAL DAILY
Qty: 90 TABLET | Refills: 3 | Status: SHIPPED | OUTPATIENT
Start: 2023-08-31 | End: 2023-09-26

## 2023-08-31 RX ORDER — LISINOPRIL 10 MG/1
10 TABLET ORAL 2 TIMES DAILY
Qty: 180 TABLET | Refills: 2 | Status: SHIPPED | OUTPATIENT
Start: 2023-08-31 | End: 2024-01-09

## 2023-09-19 ENCOUNTER — OFFICE VISIT (OUTPATIENT)
Dept: INTERNAL MEDICINE | Facility: CLINIC | Age: 68
End: 2023-09-19
Payer: COMMERCIAL

## 2023-09-19 VITALS
OXYGEN SATURATION: 99 % | WEIGHT: 122 LBS | SYSTOLIC BLOOD PRESSURE: 109 MMHG | RESPIRATION RATE: 16 BRPM | DIASTOLIC BLOOD PRESSURE: 67 MMHG | HEART RATE: 74 BPM | TEMPERATURE: 98.6 F | BODY MASS INDEX: 21.62 KG/M2 | HEIGHT: 63 IN

## 2023-09-19 DIAGNOSIS — M81.0 OSTEOPOROSIS WITHOUT CURRENT PATHOLOGICAL FRACTURE, UNSPECIFIED OSTEOPOROSIS TYPE: ICD-10-CM

## 2023-09-19 DIAGNOSIS — F43.0 ACUTE REACTION TO STRESS: ICD-10-CM

## 2023-09-19 DIAGNOSIS — Z13.0 SCREENING FOR IRON DEFICIENCY ANEMIA: ICD-10-CM

## 2023-09-19 DIAGNOSIS — Z13.1 SCREENING FOR DIABETES MELLITUS: ICD-10-CM

## 2023-09-19 DIAGNOSIS — I10 BENIGN ESSENTIAL HYPERTENSION: ICD-10-CM

## 2023-09-19 DIAGNOSIS — E78.5 HYPERLIPIDEMIA LDL GOAL <130: ICD-10-CM

## 2023-09-19 DIAGNOSIS — E03.9 ACQUIRED HYPOTHYROIDISM: ICD-10-CM

## 2023-09-19 DIAGNOSIS — F41.9 ANXIETY: Primary | ICD-10-CM

## 2023-09-19 PROCEDURE — 99215 OFFICE O/P EST HI 40 MIN: CPT

## 2023-09-19 RX ORDER — LORAZEPAM 0.5 MG/1
0.5 TABLET ORAL EVERY 8 HOURS PRN
Qty: 30 TABLET | Refills: 2 | Status: SHIPPED | OUTPATIENT
Start: 2023-09-19 | End: 2024-08-22

## 2023-09-19 RX ORDER — ESCITALOPRAM OXALATE 20 MG/1
20 TABLET ORAL DAILY
Qty: 90 TABLET | Refills: 1 | Status: SHIPPED | OUTPATIENT
Start: 2023-09-19 | End: 2024-03-28

## 2023-09-19 ASSESSMENT — PATIENT HEALTH QUESTIONNAIRE - PHQ9
SUM OF ALL RESPONSES TO PHQ QUESTIONS 1-9: 0
5. POOR APPETITE OR OVEREATING: SEVERAL DAYS

## 2023-09-19 ASSESSMENT — ANXIETY QUESTIONNAIRES
GAD7 TOTAL SCORE: 1
1. FEELING NERVOUS, ANXIOUS, OR ON EDGE: NOT AT ALL
7. FEELING AFRAID AS IF SOMETHING AWFUL MIGHT HAPPEN: NOT AT ALL
5. BEING SO RESTLESS THAT IT IS HARD TO SIT STILL: NOT AT ALL
3. WORRYING TOO MUCH ABOUT DIFFERENT THINGS: NOT AT ALL
GAD7 TOTAL SCORE: 1
6. BECOMING EASILY ANNOYED OR IRRITABLE: NOT AT ALL
2. NOT BEING ABLE TO STOP OR CONTROL WORRYING: NOT AT ALL
IF YOU CHECKED OFF ANY PROBLEMS ON THIS QUESTIONNAIRE, HOW DIFFICULT HAVE THESE PROBLEMS MADE IT FOR YOU TO DO YOUR WORK, TAKE CARE OF THINGS AT HOME, OR GET ALONG WITH OTHER PEOPLE: NOT DIFFICULT AT ALL

## 2023-09-19 NOTE — PROGRESS NOTES
Assessment & Plan     (F41.9) Anxiety  (primary encounter diagnosis)  Comment:    passed away in march from a glioblastoma. He was diagnosed in December.  Having more good days than bad days lately. Not having as many tearful episodes.  She is watching her granddaughter Angela 2 days per week. She really enjoys doing this. Is very close with her family and friends. 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   Was started on Lexapro about 6 months ago, currently on 20MG. She feels her mood is fairly stable. Has great support system. She was wondering if she should come off of Lexapro since she feels she is doing okay. We discussed since she is tolerating this medication without any side effects and is feeling, ideally I would like her to stay on this for about another 6 months or so as she was only initially started on this about 6 months ago. She is in agreement with this plan.  Plan: escitalopram (LEXAPRO) 20 MG tablet            (E03.9) Acquired hypothyroidism  Comment: Update TSH. Currently taking Synthroid 100MCG. Needs refill once TSH updated  Plan: TSH with free T4 reflex            (F43.0) Acute reaction to stress  Comment: uses Ativan very infrequently. Feels having this prescription helps keep her anxiety well controlled. Uses it maybe 1-2x/month at maximum.  Plan: LORazepam (ATIVAN) 0.5 MG tablet            (I10) Benign essential hypertension  Comment: BP at goal at 109/67. Prescribed 20MG of  Lisinopril but notes she has been alternating every other day by taking 10MG one day and 20MG the next. I have advised her to closely monitor BP at home as we may be able to keep her on the lower dose at 10MG consistently.   Plan: Albumin Random Urine Quantitative with Creat         Ratio            (Z13.0) Screening for iron deficiency anemia  Comment:   Plan: CBC with platelets            (Z13.1) Screening for diabetes mellitus  Comment:    Plan: Basic metabolic panel  (Ca, Cl, CO2, Creat,         Gluc, K, Na, BUN)            (E78.5) Hyperlipidemia LDL goal <130  Comment:   Plan: Lipid panel reflex to direct LDL Fasting            (M81.0) Osteoporosis without current pathological fracture, unspecified osteoporosis type  Comment: On Prolia for osteoporosis- started this about 3 years ago in October of 2020. She tried fosamax in the past but didn't tolerate this well. I will have her update her DXA and then we will assess if she needs to see Endocrinology or not. I can write for Prolia in future if needed. Last prolia injection was in July- due every 6 months  Plan: DX Hip/Pelvis/Spine              40 minutes spent by me on the date of the encounter doing chart review, history and exam, documentation and further activities per the note           GUNNER Uribe CNP  [facility] ELSY Lujan is a 68 year old, presenting for the following health issues:  Hypertension, Depression, Anxiety, and Thyroid Problem      History of Present Illness       Reason for visit:  Adjust med. general physical    She eats 2-3 servings of fruits and vegetables daily.She consumes 1 sweetened beverage(s) daily.She exercises with enough effort to increase her heart rate 10 to 19 minutes per day.  She exercises with enough effort to increase her heart rate 4 days per week.   She is taking medications regularly.       Hypertension Follow-up    Do you check your blood pressure regularly outside of the clinic? No   Are you following a low salt diet? No  Are your blood pressures ever more than 140 on the top number (systolic) OR more   than 90 on the bottom number (diastolic), for example 140/90? No    Depression and Anxiety Follow-Up  How are you doing with your depression since your last visit? Improved   How are you doing with your anxiety since your last visit?  Improved   Are you having other symptoms that might be associated with  "depression or anxiety? Yes:  appetite suppression , listlessness    Have you had a significant life event? Grief or Loss  Do you have any concerns with your use of alcohol or other drugs? No    Social History     Tobacco Use    Smoking status: Never    Smokeless tobacco: Never   Vaping Use    Vaping Use: Never used   Substance Use Topics    Alcohol use: No    Drug use: No         1/3/2023     3:11 PM   PHQ   PHQ-9 Total Score 14   Q9: Thoughts of better off dead/self-harm past 2 weeks Not at all          No data to display                  Suicide Assessment Five-step Evaluation and Treatment (SAFE-T)    Hypothyroidism Follow-up    Since last visit, patient describes the following symptoms: Weight stable, no hair loss, no skin changes, no constipation, no loose stools, weight loss of 15 lbs, anxiety, and depression          Objective    /67   Pulse 74   Temp 98.6  F (37  C) (Oral)   Resp 16   Ht 1.6 m (5' 3\")   Wt 55.3 kg (122 lb)   LMP 03/01/2004 (Exact Date)   SpO2 99%   BMI 21.61 kg/m    Body mass index is 21.61 kg/m .      Physical Exam  Constitutional:       General: She is not in acute distress.     Appearance: Normal appearance. She is not ill-appearing, toxic-appearing or diaphoretic.   HENT:      Head: Normocephalic and atraumatic.   Eyes:      Conjunctiva/sclera: Conjunctivae normal.   Cardiovascular:      Rate and Rhythm: Normal rate and regular rhythm.      Heart sounds: Normal heart sounds.   Pulmonary:      Effort: Pulmonary effort is normal.      Breath sounds: Normal breath sounds.   Skin:     General: Skin is warm and dry.   Neurological:      Mental Status: She is alert and oriented to person, place, and time.   Psychiatric:         Mood and Affect: Mood normal.         Behavior: Behavior normal.         Thought Content: Thought content normal.         Judgment: Judgment normal.             "

## 2023-09-22 ENCOUNTER — TELEPHONE (OUTPATIENT)
Dept: INTERNAL MEDICINE | Facility: CLINIC | Age: 68
End: 2023-09-22
Payer: COMMERCIAL

## 2023-09-22 NOTE — TELEPHONE ENCOUNTER
Call received from patient stating she recently had an appointment with Elizabeth Mata and was advised she could take an over the counter antacid but patient can't remember which one she recommended. Please advise.

## 2023-09-25 ENCOUNTER — HOSPITAL ENCOUNTER (OUTPATIENT)
Dept: MAMMOGRAPHY | Facility: CLINIC | Age: 68
Discharge: HOME | End: 2023-09-25
Attending: INTERNAL MEDICINE | Admitting: INTERNAL MEDICINE
Payer: COMMERCIAL

## 2023-09-25 ENCOUNTER — LAB (OUTPATIENT)
Dept: LAB | Facility: CLINIC | Age: 68
End: 2023-09-25
Payer: COMMERCIAL

## 2023-09-25 ENCOUNTER — MYC MEDICAL ADVICE (OUTPATIENT)
Dept: INTERNAL MEDICINE | Facility: CLINIC | Age: 68
End: 2023-09-25

## 2023-09-25 DIAGNOSIS — E78.5 HYPERLIPIDEMIA LDL GOAL <130: ICD-10-CM

## 2023-09-25 DIAGNOSIS — Z13.1 SCREENING FOR DIABETES MELLITUS: ICD-10-CM

## 2023-09-25 DIAGNOSIS — Z12.31 VISIT FOR SCREENING MAMMOGRAM: ICD-10-CM

## 2023-09-25 DIAGNOSIS — I10 BENIGN ESSENTIAL HYPERTENSION: ICD-10-CM

## 2023-09-25 DIAGNOSIS — Z13.0 SCREENING FOR IRON DEFICIENCY ANEMIA: ICD-10-CM

## 2023-09-25 DIAGNOSIS — E03.9 ACQUIRED HYPOTHYROIDISM: ICD-10-CM

## 2023-09-25 LAB
ANION GAP SERPL CALCULATED.3IONS-SCNC: 10 MMOL/L (ref 7–15)
BUN SERPL-MCNC: 20.3 MG/DL (ref 8–23)
CALCIUM SERPL-MCNC: 9.6 MG/DL (ref 8.8–10.2)
CHLORIDE SERPL-SCNC: 103 MMOL/L (ref 98–107)
CHOLEST SERPL-MCNC: 203 MG/DL
CREAT SERPL-MCNC: 0.82 MG/DL (ref 0.51–0.95)
CREAT UR-MCNC: 74.6 MG/DL
DEPRECATED HCO3 PLAS-SCNC: 27 MMOL/L (ref 22–29)
EGFRCR SERPLBLD CKD-EPI 2021: 78 ML/MIN/1.73M2
ERYTHROCYTE [DISTWIDTH] IN BLOOD BY AUTOMATED COUNT: 12.1 % (ref 10–15)
GLUCOSE SERPL-MCNC: 96 MG/DL (ref 70–99)
HCT VFR BLD AUTO: 38.4 % (ref 35–47)
HDLC SERPL-MCNC: 59 MG/DL
HGB BLD-MCNC: 12.9 G/DL (ref 11.7–15.7)
LDLC SERPL CALC-MCNC: 130 MG/DL
MCH RBC QN AUTO: 31.1 PG (ref 26.5–33)
MCHC RBC AUTO-ENTMCNC: 33.6 G/DL (ref 31.5–36.5)
MCV RBC AUTO: 93 FL (ref 78–100)
MICROALBUMIN UR-MCNC: <12 MG/L
MICROALBUMIN/CREAT UR: NORMAL MG/G{CREAT}
NONHDLC SERPL-MCNC: 144 MG/DL
PLATELET # BLD AUTO: 222 10E3/UL (ref 150–450)
POTASSIUM SERPL-SCNC: 4.9 MMOL/L (ref 3.4–5.3)
RBC # BLD AUTO: 4.15 10E6/UL (ref 3.8–5.2)
SODIUM SERPL-SCNC: 140 MMOL/L (ref 136–145)
T4 FREE SERPL-MCNC: 1.78 NG/DL (ref 0.9–1.7)
TRIGL SERPL-MCNC: 71 MG/DL
TSH SERPL DL<=0.005 MIU/L-ACNC: 0.22 UIU/ML (ref 0.3–4.2)
WBC # BLD AUTO: 5.2 10E3/UL (ref 4–11)

## 2023-09-25 PROCEDURE — 82043 UR ALBUMIN QUANTITATIVE: CPT

## 2023-09-25 PROCEDURE — 77067 SCR MAMMO BI INCL CAD: CPT

## 2023-09-25 PROCEDURE — 84443 ASSAY THYROID STIM HORMONE: CPT

## 2023-09-25 PROCEDURE — 85027 COMPLETE CBC AUTOMATED: CPT

## 2023-09-25 PROCEDURE — 82570 ASSAY OF URINE CREATININE: CPT

## 2023-09-25 PROCEDURE — 36415 COLL VENOUS BLD VENIPUNCTURE: CPT

## 2023-09-25 PROCEDURE — 80061 LIPID PANEL: CPT

## 2023-09-25 PROCEDURE — 84439 ASSAY OF FREE THYROXINE: CPT

## 2023-09-25 PROCEDURE — 80048 BASIC METABOLIC PNL TOTAL CA: CPT

## 2023-09-26 RX ORDER — LEVOTHYROXINE SODIUM 88 UG/1
88 TABLET ORAL DAILY
Qty: 90 TABLET | Refills: 0 | Status: SHIPPED | OUTPATIENT
Start: 2023-09-26 | End: 2023-12-22

## 2023-09-28 ENCOUNTER — HOSPITAL ENCOUNTER (OUTPATIENT)
Dept: MAMMOGRAPHY | Facility: CLINIC | Age: 68
Discharge: HOME | End: 2023-09-28
Attending: INTERNAL MEDICINE
Payer: COMMERCIAL

## 2023-09-28 DIAGNOSIS — R92.8 ABNORMAL MAMMOGRAM: ICD-10-CM

## 2023-09-28 PROCEDURE — 77061 BREAST TOMOSYNTHESIS UNI: CPT | Mod: LT

## 2023-10-25 ENCOUNTER — OFFICE VISIT (OUTPATIENT)
Dept: PODIATRY | Facility: CLINIC | Age: 68
End: 2023-10-25
Payer: COMMERCIAL

## 2023-10-25 VITALS — BODY MASS INDEX: 21.61 KG/M2 | WEIGHT: 122 LBS | SYSTOLIC BLOOD PRESSURE: 112 MMHG | DIASTOLIC BLOOD PRESSURE: 68 MMHG

## 2023-10-25 DIAGNOSIS — M79.671 RIGHT FOOT PAIN: ICD-10-CM

## 2023-10-25 DIAGNOSIS — L60.0 INGROWN NAIL OF GREAT TOE OF RIGHT FOOT: Primary | ICD-10-CM

## 2023-10-25 PROCEDURE — 11750 EXCISION NAIL&NAIL MATRIX: CPT | Mod: T5 | Performed by: PODIATRIST

## 2023-10-25 PROCEDURE — 99213 OFFICE O/P EST LOW 20 MIN: CPT | Mod: 25 | Performed by: PODIATRIST

## 2023-10-25 RX ORDER — SILVER SULFADIAZINE 10 MG/G
CREAM TOPICAL 2 TIMES DAILY
Qty: 25 G | Refills: 1 | Status: SHIPPED | OUTPATIENT
Start: 2023-10-25 | End: 2024-04-23

## 2023-10-25 NOTE — PATIENT INSTRUCTIONS
Thank you for choosing [facility] Podiatry / Foot & Ankle Surgery!    DR MARINELLI'S CLINIC:  [facility]   [address]   [place] [address]      TRIAGE LINE: [phone]  APPOINTMENTS: [phone]  RADIOLOGY: [phone]  SET UP SURGERY: [phone]  PHYSICAL THERAPY: [phone]   FAX NUMBER: [phone]  BILLING QUESTIONS: [phone]       Follow up: as needed.       INGROWN TOENAILS  When a toenail is ingrown, it is curved and grows into the skin, usually at the nail borders (the sides of the nail). This  digging in  of the nail irritates the skin, often creating pain, redness, swelling, and warmth in the toe.  If an ingrown nail causes a break in the skin, bacteria may enter and cause an infection in the area, which is often marked by drainage and a foul odor. However, even if the toe isn t painful, red, swollen, or warm, a nail that curves downward into the skin can progress to an infection.  CAUSES:  Heredity: In many people, the tendency for ingrown toenails is inherited.   Trauma: Sometimes an ingrown toenail is the result of trauma, such as stubbing your toe, having an object fall on your toe, or engaging in activities that involve repeated pressure on the toes, such as kicking or running.   Improper Trimming:  The most common cause of ingrown toenails is cutting your nails too short. This encourages the skin next to the nail to fold over the nail.   Improperly Sized Footwear: Ingrown toenails can result from wearing socks and shoes that are tight or short.   Nail Conditions: Ingrown toenails can be caused by nail problems, such as fungal infections or losing a nail due to trauma.   TREATMENT: Sometimes initial treatment for ingrown toenails can be safely performed at home. However, home treatment is strongly discouraged if an infection is suspected, or for those who have medical conditions that put feet at high risk, such as diabetes, nerve damage in the foot, or  poor circulation.  Home care: If you don t have an infection or any of the above medical conditions, you can soak your foot in room-temperature water (adding Epsom s salt may be recommended by your doctor), and gently massage the side of the nail fold to help reduce the inflammation.  Avoid attempting  bathroom surgery.  Repeated cutting of the nail can cause the condition to worsen over time. If your symptoms fail to improve, it s time to see a foot and ankle surgeon.  Physician care: After examining the toe, the foot and ankle surgeon will select the treatment best suited for you. If an infection is present, an oral antibiotic may be prescribed.  Sometimes a minor surgical procedure, often performed in the office, will ease the pain and remove the offending nail. After applying a local anesthetic, the doctor removes part of the nail s side border. Some nails may become ingrown again, requiring removal of the nail root.  Following the nail procedure, a light bandage will be applied. Most people experience very little pain after surgery and may resume normal activity the next day. If your surgeon has prescribed an oral antibiotic, be sure to take all the medication, even if your symptoms have improved.  PREVENTION:  Proper Trimming: Cut toenails in a fairly straight line, and don t cut them too short. You should be able to get your fingernail under the sides and end of the nail.   Well-fitting Footwear: Don t wear shoes that are short or tight in the toe area. Avoid shoes that are loose, because they too cause pressure on the toes, especially when running or walking briskly.     INGROWN TOENAIL REMOVAL AFTERCARE   Go directly home and elevate the affected foot on one or two pillows for the remainder of the day/evening if possible. Your toe may stay numb anywhere from 2-8 hours.   Take Tylenol, ibuprofen or another anti-inflammatory as needed for pain.   Take antibiotic if that has been prescribed. Finish the entire  prescribed antibiotic even if your symptoms have improved.   The evening of the procedure, soak/wash the affected area in warm water (you may add Epsom salt) for 5 to 10 minutes. Do this twice a day for 2-4 weeks (6-8 weeks if you had phenol) (you may count showering/bathing as one soak).  After soaks, pat the area dry and then allow to airdry for a few minutes. Apply antibiotic ointment to the area and cover with 2 X 2 gauze and paper tape or band-aid.  You may pursue everyday activities as tolerated with either an open toe shoe or cut-out shoe as needed or you may wear regular shoes if no pain is noted.  Watch for any signs and symptoms of infection such as: redness, red streaks going up the foot/leg, swelling, pus or foul odor. Those that have had the phenol procedure, the toe will drain longer and will look like it is infected because it is a chemical burn.   Please call with questions.

## 2023-10-25 NOTE — PROGRESS NOTES
Podiatry / Foot and Ankle Surgery Progress Note    October 25, 2023    Subject: Patient was seen for recurrent painful ingrown nail of the right great toe.  Notes that its been sore for the last few weeks.  Worse with pressure.  Can be 6 out of 10 at its worst.  Denies fever, nausea, vomiting.  Wondering what can be done to get rid of it.    Objective:  Vitals: Wt 55.3 kg (122 lb)   LMP 03/01/2004 (Exact Date)   BMI 21.61 kg/m    BMI= Body mass index is 21.61 kg/m .    General:  Patient is alert and orientated.  NAD.    Vascular:  DP and PT pulses are palpable.  No edema or varicosities noted.  CFT's < 3secs.  Skin temp is normal.     Neuro:  Light and gross touch sensation intact to digits, dorsum, and plantar aspects of the feet.     Derm: medial border of the right great toe is incurvated.  Localized redness and pain on palpation.     Musculoskeletal:  No foot deformity noted.       Assessment:    Right foot pain  Ingrown nail of great toe of right foot     Medical Decision Making/Plan:  The potential causes and nature of an ingrown toenail were discussed with the patient.  We reviewed the natural history/prognosis of the condition and potential risks if no treatment is provided.      Treatment options discussed included conservative management (oral antibiotics, soaking of foot, adequate width shoes)  as well as surgical management (partial or total nail removal).  The pros and cons of both forms of treatment were reviewed.      After thorough discussion and answering all questions, the patient elected to have the border removed permanently.  We will have her soak the foot twice a day for 6 weeks and apply Silvadene cream and a bandage.  All questions were answered to patient's satisfaction she will call further questions or concerns.     Procedure: After verbal consent, the right big toe was anesthetized with 5cc's of 1% lidocaine plain. A tourniquet was applied to the toe. The medial border was then raised  from the nail bed and then cut the length of the nail.  The offending nail border was then removed.  Three 30 second applications of phenol were applied to the nail bed and nail matrix.  The area was then flushed with copious amounts of alcohol.  Bacitracin was applied to the nail bed.  The tourniquet was removed.  Bandage was applied to the toe.  The patient tolerated the procedure and anesthesia well.       Patient Risk Factor:  Patient is a low risk factor for infection.     Etelvina Matias DPM, Podiatry/Foot and Ankle Surgery

## 2023-10-25 NOTE — LETTER
10/25/2023         RE: Le Vora  [address] [address] [place] [id number]        Dear Colleague,    Thank you for referring your patient, Le Vora, to the [facility] PODIATRY. Please see a copy of my visit note below.    Podiatry / Foot and Ankle Surgery Progress Note    October 25, 2023    Subject: Patient was seen for recurrent painful ingrown nail of the right great toe.  Notes that its been sore for the last few weeks.  Worse with pressure.  Can be 6 out of 10 at its worst.  Denies fever, nausea, vomiting.  Wondering what can be done to get rid of it.    Objective:  Vitals: Wt 55.3 kg (122 lb)   LMP 03/01/2004 (Exact Date)   BMI 21.61 kg/m    BMI= Body mass index is 21.61 kg/m .    General:  Patient is alert and orientated.  NAD.    Vascular:  DP and PT pulses are palpable.  No edema or varicosities noted.  CFT's < 3secs.  Skin temp is normal.     Neuro:  Light and gross touch sensation intact to digits, dorsum, and plantar aspects of the feet.     Derm: medial border of the right great toe is incurvated.  Localized redness and pain on palpation.     Musculoskeletal:  No foot deformity noted.       Assessment:    Right foot pain  Ingrown nail of great toe of right foot     Medical Decision Making/Plan:  The potential causes and nature of an ingrown toenail were discussed with the patient.  We reviewed the natural history/prognosis of the condition and potential risks if no treatment is provided.      Treatment options discussed included conservative management (oral antibiotics, soaking of foot, adequate width shoes)  as well as surgical management (partial or total nail removal).  The pros and cons of both forms of treatment were reviewed.      After thorough discussion and answering all questions, the patient elected to have the border removed permanently.  We will have her soak the foot twice a day for 6 weeks and apply Silvadene cream and a bandage.  All  questions were answered to patient's satisfaction she will call further questions or concerns.     Procedure: After verbal consent, the right big toe was anesthetized with 5cc's of 1% lidocaine plain. A tourniquet was applied to the toe. The medial border was then raised from the nail bed and then cut the length of the nail.  The offending nail border was then removed.  Three 30 second applications of phenol were applied to the nail bed and nail matrix.  The area was then flushed with copious amounts of alcohol.  Bacitracin was applied to the nail bed.  The tourniquet was removed.  Bandage was applied to the toe.  The patient tolerated the procedure and anesthesia well.       Patient Risk Factor:  Patient is a low risk factor for infection.     Etelvina Matias DPM, Podiatry/Foot and Ankle Surgery      Again, thank you for allowing me to participate in the care of your patient.        Sincerely,        Etelvina Matias DPM, Podiatry/Foot and Ankle Surgery

## 2023-11-04 ENCOUNTER — HEALTH MAINTENANCE LETTER (OUTPATIENT)
Age: 68
End: 2023-11-04

## 2023-11-12 ENCOUNTER — MYC MEDICAL ADVICE (OUTPATIENT)
Dept: PODIATRY | Facility: CLINIC | Age: 68
End: 2023-11-12
Payer: COMMERCIAL

## 2023-11-13 NOTE — TELEPHONE ENCOUNTER
Rafy Lujan,    You had the permanent procedure done with acid and it can take up to 8 weeks to heal.  The toe itself from the pictures look okay and I don't see any signs of infection. We still want you to continue to soak and use the silvadene cream.     If you are still concerned, I recommend follow up in clinic.     Etelvina Matias DPM

## 2023-11-13 NOTE — TELEPHONE ENCOUNTER
Patient last seen 10/25/23 for permanent ingrown toenail removal of right great toe. Please see patient MyChart message with attached photo and advise on response.     Freida Paredes MSA, ATC  Certified Athletic Trainer

## 2023-11-16 ENCOUNTER — ANCILLARY PROCEDURE (OUTPATIENT)
Dept: BONE DENSITY | Facility: CLINIC | Age: 68
End: 2023-11-16
Payer: COMMERCIAL

## 2023-11-16 DIAGNOSIS — M81.0 OSTEOPOROSIS WITHOUT CURRENT PATHOLOGICAL FRACTURE, UNSPECIFIED OSTEOPOROSIS TYPE: ICD-10-CM

## 2023-11-16 PROCEDURE — 77080 DXA BONE DENSITY AXIAL: CPT | Mod: TC | Performed by: PHYSICIAN ASSISTANT

## 2023-11-27 ENCOUNTER — TRANSFERRED RECORDS (OUTPATIENT)
Dept: HEALTH INFORMATION MANAGEMENT | Facility: CLINIC | Age: 68
End: 2023-11-27
Payer: COMMERCIAL

## 2023-11-28 DIAGNOSIS — T88.7XXS UNSPECIFIED ADVERSE EFFECT OF DRUG OR MEDICAMENT, SEQUELA (CODE): ICD-10-CM

## 2023-11-28 DIAGNOSIS — M81.0 OSTEOPOROSIS WITHOUT CURRENT PATHOLOGICAL FRACTURE, UNSPECIFIED OSTEOPOROSIS TYPE: Primary | ICD-10-CM

## 2023-11-29 NOTE — PROGRESS NOTES
Spoke to Patient and scheduled RN appointment for her Prolia on 01/09/23.  Sending to RN to address.

## 2023-11-29 NOTE — PROGRESS NOTES
Will need to wait until closer to the time of the appointment and will monitor for insurance verification.  Will close encounter.    Next 5 appointments (look out 90 days)      Jan 09, 2024 10:00 AM  Nurse Only with Ri Rn  [facility] ([facility] ) [address]  [address]  [facility] [address]  [phone]             nothing

## 2023-12-21 ENCOUNTER — LAB (OUTPATIENT)
Dept: LAB | Facility: CLINIC | Age: 68
End: 2023-12-21
Payer: COMMERCIAL

## 2023-12-21 DIAGNOSIS — M81.0 OSTEOPOROSIS WITHOUT CURRENT PATHOLOGICAL FRACTURE, UNSPECIFIED OSTEOPOROSIS TYPE: ICD-10-CM

## 2023-12-21 DIAGNOSIS — T50.905S UNSPECIFIED ADVERSE EFFECT OF DRUG OR MEDICAMENT, SEQUELA: ICD-10-CM

## 2023-12-21 DIAGNOSIS — E03.9 ACQUIRED HYPOTHYROIDISM: ICD-10-CM

## 2023-12-21 LAB
CALCIUM SERPL-MCNC: 9.6 MG/DL (ref 8.8–10.2)
T4 FREE SERPL-MCNC: 1.15 NG/DL (ref 0.9–1.7)
TSH SERPL DL<=0.005 MIU/L-ACNC: 5.7 UIU/ML (ref 0.3–4.2)

## 2023-12-21 PROCEDURE — 36415 COLL VENOUS BLD VENIPUNCTURE: CPT

## 2023-12-21 PROCEDURE — 84443 ASSAY THYROID STIM HORMONE: CPT

## 2023-12-21 PROCEDURE — 82310 ASSAY OF CALCIUM: CPT

## 2023-12-21 PROCEDURE — 84439 ASSAY OF FREE THYROXINE: CPT

## 2023-12-21 PROCEDURE — 82565 ASSAY OF CREATININE: CPT

## 2023-12-22 ENCOUNTER — MYC MEDICAL ADVICE (OUTPATIENT)
Dept: INTERNAL MEDICINE | Facility: CLINIC | Age: 68
End: 2023-12-22
Payer: COMMERCIAL

## 2023-12-22 ENCOUNTER — TELEPHONE (OUTPATIENT)
Dept: INTERNAL MEDICINE | Facility: CLINIC | Age: 68
End: 2023-12-22
Payer: COMMERCIAL

## 2023-12-22 DIAGNOSIS — E03.9 ACQUIRED HYPOTHYROIDISM: Primary | ICD-10-CM

## 2023-12-22 LAB
CREAT SERPL-MCNC: 0.87 MG/DL (ref 0.51–0.95)
EGFRCR SERPLBLD CKD-EPI 2021: 73 ML/MIN/1.73M2

## 2023-12-22 RX ORDER — LEVOTHYROXINE SODIUM 88 UG/1
TABLET ORAL
Qty: 96 TABLET | Refills: 1 | Status: SHIPPED | OUTPATIENT
Start: 2023-12-22 | End: 2024-03-14

## 2023-12-22 NOTE — TELEPHONE ENCOUNTER
Patient calls to ask if provider can review her thyroid labs before the weekend. Patient states she is not having any symptoms. Please advise if patient needs dose adjustment on her levothyroxine.     TSH   Date Value Ref Range Status   12/21/2023 5.70 (H) 0.30 - 4.20 uIU/mL Final   08/25/2022 0.64 0.40 - 4.00 mU/L Final   04/08/2021 0.91 0.40 - 4.00 mU/L Final       Thank you,  Jaxson Martinez, Triage RN [facility]  8:49 AM 12/22/2023

## 2023-12-22 NOTE — TELEPHONE ENCOUNTER
Here is what I would like her to do: She should take 1.5 tablets one day a week (she can choose whatever day this is). For example,  She should take 1 tablet 88mcg Monday-Saturday and take 1.5 tablets on Sunday. She can recheck TSH in 3 months.

## 2024-01-09 ENCOUNTER — MYC REFILL (OUTPATIENT)
Dept: INTERNAL MEDICINE | Facility: CLINIC | Age: 69
End: 2024-01-09

## 2024-01-09 ENCOUNTER — ALLIED HEALTH/NURSE VISIT (OUTPATIENT)
Dept: NURSING | Facility: CLINIC | Age: 69
End: 2024-01-09
Payer: COMMERCIAL

## 2024-01-09 DIAGNOSIS — I10 BENIGN ESSENTIAL HYPERTENSION: ICD-10-CM

## 2024-01-09 DIAGNOSIS — M81.0 OSTEOPOROSIS WITHOUT CURRENT PATHOLOGICAL FRACTURE, UNSPECIFIED OSTEOPOROSIS TYPE: Primary | ICD-10-CM

## 2024-01-09 PROCEDURE — 96372 THER/PROPH/DIAG INJ SC/IM: CPT

## 2024-01-09 PROCEDURE — 99207 PR NO CHARGE NURSE ONLY: CPT

## 2024-01-09 RX ORDER — LISINOPRIL 10 MG/1
10 TABLET ORAL 2 TIMES DAILY
Qty: 180 TABLET | Refills: 2 | Status: SHIPPED | OUTPATIENT
Start: 2024-01-09 | End: 2024-04-23

## 2024-01-09 NOTE — PROGRESS NOTES
Clinic Administered Medication Documentation      Prolia Documentation    Indication: Prolia  (denosumab) is a prescription medicine used to treat osteoporosis in patients who:   Are at high risk for fracture, meaning patients who have had a fracture related to osteoporosis, or who have multiple risk factors for fracture.  Cannot use another osteoporosis medicine or other osteoporosis medicines did not work well.  The timeline for early/late injections would be 4 weeks early and any time after the 6 month nina. If a patient receives their injection late, then the subsequent injection would be 6 months from the date that they actually received the injection.    When was the last injection?  2023  Was the last injection at least 6 months ago? Yes  Has the prior authorization been completed?  Yes  Is there an active order (written within the past 365 days, with administrations remaining, not ) in the chart?  Yes  Patient denies any dental work involving the bone (e.g. tooth extraction or dental implants) in the past 4 weeks?  Yes  Patient denies plans for any dental work involving the bone (e.g. tooth extraction or dental implants) in the next 4 weeks? Yes    The following steps were completed to comply with the REMS program for Prolia:  Reviewed information in the Medication Guide and Patient Counseling Chart, including the serious risks of Prolia  and the symptoms of each risk.  Advised patient to seek prompt medical attention if they have signs or symptoms of any of the serious risks.  Provided each patient a copy of the Medication Guide and Patient Brochure.    Prior to injection, verified patient identity using patient's name and date of birth. Medication was administered. Please see MAR and medication order for additional information. Patient instructed to remain in clinic for 15 minutes and report any adverse reaction to staff immediately.    Vial/Syringe: Syringe  Was this medication supplied by  the patient? No  Verified that the patient has refills remaining in their prescription.

## 2024-03-12 ENCOUNTER — LAB (OUTPATIENT)
Dept: LAB | Facility: CLINIC | Age: 69
End: 2024-03-12
Payer: COMMERCIAL

## 2024-03-12 DIAGNOSIS — E03.9 ACQUIRED HYPOTHYROIDISM: ICD-10-CM

## 2024-03-12 PROCEDURE — 84443 ASSAY THYROID STIM HORMONE: CPT

## 2024-03-12 PROCEDURE — 36415 COLL VENOUS BLD VENIPUNCTURE: CPT

## 2024-03-13 ENCOUNTER — MYC MEDICAL ADVICE (OUTPATIENT)
Dept: INTERNAL MEDICINE | Facility: CLINIC | Age: 69
End: 2024-03-13
Payer: COMMERCIAL

## 2024-03-13 LAB — TSH SERPL DL<=0.005 MIU/L-ACNC: 3.99 UIU/ML (ref 0.3–4.2)

## 2024-03-14 RX ORDER — LEVOTHYROXINE SODIUM 88 UG/1
TABLET ORAL
Qty: 96 TABLET | Refills: 1 | Status: SHIPPED | OUTPATIENT
Start: 2024-03-14 | End: 2024-04-23

## 2024-03-28 ENCOUNTER — MYC REFILL (OUTPATIENT)
Dept: INTERNAL MEDICINE | Facility: CLINIC | Age: 69
End: 2024-03-28
Payer: COMMERCIAL

## 2024-03-28 DIAGNOSIS — F41.9 ANXIETY: ICD-10-CM

## 2024-03-28 RX ORDER — ESCITALOPRAM OXALATE 20 MG/1
20 TABLET ORAL DAILY
Qty: 90 TABLET | Refills: 0 | Status: SHIPPED | OUTPATIENT
Start: 2024-03-28 | End: 2024-04-23

## 2024-04-16 SDOH — HEALTH STABILITY: PHYSICAL HEALTH: ON AVERAGE, HOW MANY DAYS PER WEEK DO YOU ENGAGE IN MODERATE TO STRENUOUS EXERCISE (LIKE A BRISK WALK)?: 5 DAYS

## 2024-04-16 SDOH — HEALTH STABILITY: PHYSICAL HEALTH: ON AVERAGE, HOW MANY MINUTES DO YOU ENGAGE IN EXERCISE AT THIS LEVEL?: 30 MIN

## 2024-04-16 ASSESSMENT — SOCIAL DETERMINANTS OF HEALTH (SDOH): HOW OFTEN DO YOU GET TOGETHER WITH FRIENDS OR RELATIVES?: MORE THAN THREE TIMES A WEEK

## 2024-04-16 NOTE — COMMUNITY RESOURCES LIST (ENGLISH)
April 16, 2024           YOUR PERSONALIZED LIST OF SERVICES & PROGRAMS               Bill Payment Assistance      [employer] [place] [employer] [facility] [employer] [place] [employer]  [address] [place] [address] (Distance: 6.7 miles)  Phone: [phone]  Language: English  Fee: Free  Accessibility: Ada accessible      [facility] - Utility payment assistance  [address] [place] [address] (Distance: 8.6 miles)  Language: English  Fee: Free      - Dislocated Worker/Adult [employer] Employment Program  Phone: [phone]) [phone]  Email: junee@[email or web address]  Website: https://[employer]/services/employment-services/dislocated-worker-program/  Language: English, Armenian  Hours: Mon 8:00 AM - 4:30 PM Tue 8:00 AM - 4:30 PM Wed 8:00 AM - 4:30 PM Thu 8:00 AM - 4:30 PM Fri 8:00 AM - 4:30 PM  Fee: Free  Accessibility: Ada accessible               IMPORTANT NUMBERS & WEBSITES        Emergency Services  911  .   [facility]  211 http://211unitedway.org  .   Poison Control  (473) 551-4564 http://[place]poison.org http://w[place]poison.org  .     Suicide and Crisis Lifeline  988 http://988lifeline.org  .   Childhelp [place] Child Abuse Hotline  104.631.1931 http://Childhelphotline.org   .   [place] Sexual Assault Hotline  (908) 498-9045 (HOPE) http://Rainn.org   .     National Runaway Safeline  (667) 120-1318 (RUNAWAY) http://1800runaway.org  .   Pregnancy & Postpartum Support  Call/text 181-938-8260  [place]: http://ppsupport[place].org  [place]: http://Innersc[employer] Research.com/wi  .   Substance Abuse National Helpline ([facility])  051-326-HELP (7671) http://Findtreatment.gov   .                DISCLAIMER: These resources have been generated via the [employer] Platform. [employer] does not endorse any service providers mentioned in this resource list. [employer] does not guarantee that the services mentioned in this resource list will be available to you or will improve your health or wellness.    [facility]

## 2024-04-23 ENCOUNTER — OFFICE VISIT (OUTPATIENT)
Dept: INTERNAL MEDICINE | Facility: CLINIC | Age: 69
End: 2024-04-23
Payer: COMMERCIAL

## 2024-04-23 VITALS
HEIGHT: 63 IN | BODY MASS INDEX: 22.5 KG/M2 | DIASTOLIC BLOOD PRESSURE: 63 MMHG | WEIGHT: 127 LBS | TEMPERATURE: 97.9 F | SYSTOLIC BLOOD PRESSURE: 95 MMHG | HEART RATE: 70 BPM | OXYGEN SATURATION: 98 % | RESPIRATION RATE: 16 BRPM

## 2024-04-23 DIAGNOSIS — F41.9 ANXIETY: ICD-10-CM

## 2024-04-23 DIAGNOSIS — E03.9 ACQUIRED HYPOTHYROIDISM: ICD-10-CM

## 2024-04-23 DIAGNOSIS — E78.5 HYPERLIPIDEMIA LDL GOAL <130: ICD-10-CM

## 2024-04-23 DIAGNOSIS — Z00.00 ENCOUNTER FOR MEDICARE ANNUAL WELLNESS EXAM: Primary | ICD-10-CM

## 2024-04-23 DIAGNOSIS — I10 BENIGN ESSENTIAL HYPERTENSION: ICD-10-CM

## 2024-04-23 PROCEDURE — G0439 PPPS, SUBSEQ VISIT: HCPCS

## 2024-04-23 PROCEDURE — 99214 OFFICE O/P EST MOD 30 MIN: CPT | Mod: 25

## 2024-04-23 RX ORDER — LEVOTHYROXINE SODIUM 88 UG/1
TABLET ORAL
Qty: 96 TABLET | Refills: 3 | Status: SHIPPED | OUTPATIENT
Start: 2024-04-23

## 2024-04-23 RX ORDER — ESCITALOPRAM OXALATE 5 MG/1
5 TABLET ORAL DAILY
Qty: 60 TABLET | Refills: 0 | Status: SHIPPED | OUTPATIENT
Start: 2024-04-23

## 2024-04-23 RX ORDER — RESPIRATORY SYNCYTIAL VIRUS VACCINE 120MCG/0.5
0.5 KIT INTRAMUSCULAR ONCE
Qty: 1 EACH | Refills: 0 | Status: CANCELLED | OUTPATIENT
Start: 2024-04-23 | End: 2024-04-23

## 2024-04-23 RX ORDER — ESCITALOPRAM OXALATE 20 MG/1
20 TABLET ORAL DAILY
Qty: 90 TABLET | Refills: 3 | Status: SHIPPED | OUTPATIENT
Start: 2024-04-23 | End: 2024-04-23

## 2024-04-23 SDOH — HEALTH STABILITY: PHYSICAL HEALTH: ON AVERAGE, HOW MANY DAYS PER WEEK DO YOU ENGAGE IN MODERATE TO STRENUOUS EXERCISE (LIKE A BRISK WALK)?: 5 DAYS

## 2024-04-23 SDOH — HEALTH STABILITY: PHYSICAL HEALTH: ON AVERAGE, HOW MANY MINUTES DO YOU ENGAGE IN EXERCISE AT THIS LEVEL?: 30 MIN

## 2024-04-23 ASSESSMENT — SOCIAL DETERMINANTS OF HEALTH (SDOH): HOW OFTEN DO YOU GET TOGETHER WITH FRIENDS OR RELATIVES?: MORE THAN THREE TIMES A WEEK

## 2024-04-23 NOTE — PROGRESS NOTES
Preventive Care Visit  [facility]  Elizabeth Mata, APRN [place], Internal Medicine  Apr 23, 2024      Assessment & Plan     She takes care of her granddaughter 3x/week. Her  passed about a year ago from a glioblastoma. She also recently lost her niece to a brain tumor.    (Z00.00) Encounter for Medicare annual wellness exam  (primary encounter diagnosis)  Comment:   Plan:     (I10) Benign essential hypertension  Comment:     She is currently only taking Lisinopril 10MG per day for her HTN. She was on 10MG BID in the past.  BP at home has been around 110-117 systolic. Today in clinic she is 95/63. I think she may be okay stopping Lisinopril altogether.   She will stop Lisinopril and check BP at home 2-3x/week and send me a [place] message in 4 weeks  Plan:     (E03.9) Acquired hypothyroidism  Comment: TSH stable- continue Synthroid 88MCG  Plan: levothyroxine (SYNTHROID/LEVOTHROID) 88 MCG         tablet            (F41.9) Anxiety  Comment: She would like to taper off of the Lexapro 20MG. We will give her a taper schedule and she will gradually decrease her dose over the next 4-6 weeks. Instructions given to her in AVS  Plan: escitalopram (LEXAPRO) 5 MG tablet,       (E78.5) Hyperlipidemia LDL goal <130  Comment:   Plan: Lipid panel reflex to direct LDL Fasting            The longitudinal plan of care for the diagnosis(es)/condition(s) as documented were addressed during this visit. Due to the added complexity in care, I will continue to support Le in the subsequent management and with ongoing continuity of care.      Counseling  Appropriate preventive services were discussed with this patient, including applicable screening as appropriate for fall prevention, nutrition, physical activity, Tobacco-use cessation, weight loss and cognition.  Checklist reviewing preventive services available has been given to the patient.  Reviewed patient's diet, addressing concerns and/or questions.                    Alexander Lujan is a 68 year old, presenting for the following:  Wellness Visit        4/23/2024    12:43 PM   Additional Questions   Roomed by Shanti         Health Care Directive  Patient does not have a Health Care Directive or Living Will: Discussed advance care planning with patient; however, patient declined at this time.    HPI        4/23/2024   General Health   How would you rate your overall physical health? Good   Feel stress (tense, anxious, or unable to sleep) To some extent   (!) STRESS CONCERN      4/23/2024   Nutrition   Diet: Low fat/cholesterol         4/23/2024   Exercise   Days per week of moderate/strenous exercise 5 days   Average minutes spent exercising at this level 30 min         4/23/2024   Social Factors   Frequency of gathering with friends or relatives More than three times a week   Worry food won't last until get money to buy more No   Food not last or not have enough money for food? No   Do you have housing?  Yes   Are you worried about losing your housing? No   Lack of transportation? No   Unable to get utilities (heat,electricity)? Yes   Want help with housing or utility concern? No   (!) FINANCIAL RESOURCE STRAIN CONCERN      4/23/2024   Fall Risk   Fallen 2 or more times in the past year? No   Trouble with walking or balance? No          4/23/2024   Activities of Daily Living- Home Safety   Needs help with the following daily activites None of the above   Safety concerns in the home None of the above         4/23/2024   Dental   Dentist two times every year? Yes         4/23/2024   Hearing Screening   Hearing concerns? None of the above         4/23/2024   Driving Risk Screening   Patient/family members have concerns about driving No         4/23/2024   General Alertness/Fatigue Screening   Have you been more tired than usual lately? No         4/23/2024   Urinary Incontinence Screening   Bothered by leaking urine in past 6 months No            Today's PHQ-2  Score:       4/23/2024     9:25 AM   PHQ-2 ( 1999 Pfizer)   Q1: Little interest or pleasure in doing things 0   Q2: Feeling down, depressed or hopeless 0   PHQ-2 Score 0   Q1: Little interest or pleasure in doing things Not at all   Q2: Feeling down, depressed or hopeless Not at all   PHQ-2 Score 0           4/23/2024   Substance Use   Alcohol more than 3/day or more than 7/wk Not Applicable   Do you have a current opioid prescription? No   How severe/bad is pain from 1 to 10? 0/10 (No Pain)   Do you use any other substances recreationally? No    (!) DECLINE     Social History     Tobacco Use    Smoking status: Never    Smokeless tobacco: Never   Vaping Use    Vaping status: Never Used   Substance Use Topics    Alcohol use: No    Drug use: No           9/28/2023   LAST FHS-7 RESULTS   1st degree relative breast or ovarian cancer No   Any relative bilateral breast cancer No   Any male have breast cancer No   Any ONE woman have BOTH breast AND ovarian cancer No   Any woman with breast cancer before 50yrs No   2 or more relatives with breast AND/OR ovarian cancer No   2 or more relatives with breast AND/OR bowel cancer No            ASCVD Risk   The 10-year ASCVD risk score (Nitesh BANSAL, et al., 2019) is: 5.1%    Values used to calculate the score:      Age: 68 years      Sex: Female      Is Non- : No      Diabetic: No      Tobacco smoker: No      Systolic Blood Pressure: 95 mmHg      Is BP treated: Yes      HDL Cholesterol: 59 mg/dL      Total Cholesterol: 203 mg/dL            Reviewed and updated as needed this visit by Provider                      Current providers sharing in care for this patient include:  Patient Care Team:  Elizabeth Mata APRN CNP as PCP - General (Internal Medicine)  Etelvina Matias DPM, Podiatry/Foot and Ankle Surgery as Assigned Musculoskeletal Provider  Elizabeth Mata APRN CNP as Assigned PCP    The following health maintenance items are reviewed in Epic and  "correct as of today:  Health Maintenance   Topic Date Due    RSV VACCINE (Pregnancy & 60+) (1 - 1-dose 60+ series) Never done    MEDICARE ANNUAL WELLNESS VISIT  08/25/2023    COVID-19 Vaccine (7 - 2023-24 season) 06/24/2024    ANNUAL REVIEW OF HM ORDERS  09/19/2024    MICROALBUMIN  09/25/2024    TSH W/FREE T4 REFLEX  03/12/2025    FALL RISK ASSESSMENT  04/23/2025    COLORECTAL CANCER SCREENING  08/20/2025    MAMMO SCREENING  09/28/2025    DTAP/TDAP/TD IMMUNIZATION (2 - Td or Tdap) 09/08/2026    GLUCOSE  09/25/2026    ADVANCE CARE PLANNING  08/27/2027    LIPID  09/25/2028    DEXA  11/16/2038    HEPATITIS C SCREENING  Completed    PHQ-2 (once per calendar year)  Completed    INFLUENZA VACCINE  Completed    Pneumococcal Vaccine: 65+ Years  Completed    IPV IMMUNIZATION  Aged Out    HPV IMMUNIZATION  Aged Out    MENINGITIS IMMUNIZATION  Aged Out    RSV MONOCLONAL ANTIBODY  Aged Out    PAP  Discontinued    ZOSTER IMMUNIZATION  Discontinued            Objective    Exam  BP 95/63   Pulse 70   Temp 97.9  F (36.6  C) (Tympanic)   Resp 16   Ht 1.594 m (5' 2.75\")   Wt 57.6 kg (127 lb)   LMP 03/01/2004 (Exact Date)   SpO2 98%   BMI 22.68 kg/m     Estimated body mass index is 22.68 kg/m  as calculated from the following:    Height as of this encounter: 1.594 m (5' 2.75\").    Weight as of this encounter: 57.6 kg (127 lb).        Physical Exam  Constitutional:       General: She is not in acute distress.     Appearance: Normal appearance. She is not ill-appearing, toxic-appearing or diaphoretic.   HENT:      Head: Normocephalic and atraumatic.   Eyes:      Conjunctiva/sclera: Conjunctivae normal.   Cardiovascular:      Rate and Rhythm: Normal rate and regular rhythm.      Heart sounds: Normal heart sounds.   Pulmonary:      Effort: Pulmonary effort is normal.      Breath sounds: Normal breath sounds.   Skin:     General: Skin is warm and dry.   Neurological:      Mental Status: She is alert and oriented to person, place, " and time.   Psychiatric:         Mood and Affect: Mood normal.         Behavior: Behavior normal.         Thought Content: Thought content normal.         Judgment: Judgment normal.            4/23/2024   Mini Cog   Clock Draw Score 2 Normal   3 Item Recall 3 objects recalled   Mini Cog Total Score 5              Signed Electronically by: GUNNER Uribe CNP

## 2024-04-23 NOTE — COMMUNITY RESOURCES LIST (ENGLISH)
April 23, 2024           YOUR PERSONALIZED LIST OF SERVICES & PROGRAMS               Bill Payment Assistance      [employer] [place] [employer] [facility] [employer] [place]  [address] [place] [address] (Distance: 6.7 miles)  Phone: [phone]  Language: English  Fee: Free  Accessibility: Ada accessible      [facility] - Utility payment assistance  [address] [place] [address] (Distance: 8.6 miles)  Language: English  Fee: Free      - Dislocated Worker/Adult [employer] Employment Program  Phone: (808) [phone]  Email: junee@[employer]  Website: https://[employer]/services/employment-services/dislocated-worker-program/  Language: English, Malian  Hours: Mon 8:00 AM - 4:30 PM Tue 8:00 AM - 4:30 PM Wed 8:00 AM - 4:30 PM Thu 8:00 AM - 4:30 PM Fri 8:00 AM - 4:30 PM  Fee: Free  Accessibility: Ada accessible               IMPORTANT NUMBERS & WEBSITES        Emergency Services  911  .   [employer] [facility]  211 http://211unitedway.org  .   Poison Control  (149) 321-5479 http://[place]poison.org http://wis[place]poison.org  .     Suicide and Crisis Lifeline  988 http://988lifeline.org  .   Childhelp [place] Child Abuse Hotline  307.546.8397 http://Childhelphotline.org   .   [place] Sexual Assault Hotline  (213) 881-9708 (HOPE) http://Rainn.org   .     National Runaway Safeline  (163) 288-1632 (RUNAWAY) http://1800runaway.org  .   Pregnancy & Postpartum Support  Call/text 251-996-6697  [place]: http://ppsupportmn.org  [place]: http://OwnLocal.com/wi  .   Substance Abuse National Helpline ([facility])  658-260-HELP (2318) http://Findtreatment.gov   .                DISCLAIMER: These resources have been generated via the [employer] Platform. [employer] does not endorse any service providers mentioned in this resource list. [employer] does not guarantee that the services mentioned in this resource list will be available to you or will improve your health or wellness.    [facility]

## 2024-04-23 NOTE — PATIENT INSTRUCTIONS
Cut your 20MG tablets (to equal 10MG) in half for 2 weeks.  Then you will take the 5MG tablet for 2 weeks.  Then take 5MG tablets every other day for 1-2 weeks.   Then stop.      Regarding the lisinopril- try stopping this and checking blood pressure 2-3x/week. Send me my chart message with readings in 1 month.    Please let me know if you have any questions.    Thanks!  GUNNER Uribe, CNP   [facility]

## 2024-05-06 ENCOUNTER — MYC MEDICAL ADVICE (OUTPATIENT)
Dept: INTERNAL MEDICINE | Facility: CLINIC | Age: 69
End: 2024-05-06
Payer: COMMERCIAL

## 2024-05-06 NOTE — TELEPHONE ENCOUNTER
The only blood pressure reading which is truly above our goal is the 145/85.  All of the other blood pressures look fantastic.  I would recommend she continue to take blood pressures for another 2 or 3 weeks and send me a message before we make a decision.  Recommend she check blood pressures 3-4 times a week.     It is okay if very rarely blood pressure is slightly above goal but we would not want this to be happening frequently.

## 2024-05-06 NOTE — TELEPHONE ENCOUNTER
Please see patient's mychart message below.      Please advise, thanks.    Jaxson Martinez, Triage RN [place]  1:47 PM 5/6/2024

## 2024-08-22 ENCOUNTER — MYC REFILL (OUTPATIENT)
Dept: INTERNAL MEDICINE | Facility: CLINIC | Age: 69
End: 2024-08-22
Payer: COMMERCIAL

## 2024-08-22 DIAGNOSIS — F43.0 ACUTE REACTION TO STRESS: ICD-10-CM

## 2024-08-22 RX ORDER — LORAZEPAM 0.5 MG/1
0.5 TABLET ORAL EVERY 8 HOURS PRN
Qty: 30 TABLET | Refills: 2 | Status: SHIPPED | OUTPATIENT
Start: 2024-08-22

## 2024-10-01 ENCOUNTER — TELEPHONE (OUTPATIENT)
Dept: INTERNAL MEDICINE | Facility: CLINIC | Age: 69
End: 2024-10-01
Payer: COMMERCIAL

## 2024-10-01 DIAGNOSIS — Z12.31 ENCOUNTER FOR SCREENING MAMMOGRAM FOR BREAST CANCER: Primary | ICD-10-CM

## 2024-10-01 DIAGNOSIS — M81.0 OSTEOPOROSIS WITHOUT CURRENT PATHOLOGICAL FRACTURE, UNSPECIFIED OSTEOPOROSIS TYPE: ICD-10-CM

## 2024-10-01 NOTE — TELEPHONE ENCOUNTER
Order/Referral Request    Who is requesting: patient     Orders being requested: mamm    Reason service is needed/diagnosis: patient says sh e is due    When are orders needed by: soon    Has this been discussed with Provider: unknown to writer    Does patient have a preference on a Group/Provider/Facility? yes    Does patient have an appointment scheduled?: No    Where to send orders: Place orders within Epic    Could we send this information to you in [facility] or would you prefer to receive a phone call?:   No preference   Okay to leave a detailed message?: Yes at Home number on file [phone] (home)

## 2024-10-01 NOTE — TELEPHONE ENCOUNTER
Pt is due for her Prolia shot. Does she need updated lab work.?    Also will need to Route to PA dept for approval.     Creatinine   Date Value Ref Range Status   12/21/2023 0.87 0.51 - 0.95 mg/dL Final   04/08/2021 0.75 0.52 - 1.04 mg/dL Final     Lab Results   Component Value Date    KENDELL 9.6 12/21/2023    KENDELL 9.0 04/08/2021

## 2024-10-01 NOTE — TELEPHONE ENCOUNTER
Last OV - 04/23/2024 with primary care provider for annual physical.    Call to patient to go over mammo questions:  Does patient have lump,drainage,discharge,focal pain,other symptoms?   Has the patient had a bilateral mastectomy?     Mammo orders pending, but needs patient's responses to questions above.    Attempt # 1  Called Phone # [phone]      Was Call answered? No     Non-detailed voicemail left on October 1, 2024 11:59 AM to call clinic at: [phone].     On Call Back:     Please go over questions listed above for mammo order.      Thank you,  Jaxson Martinez, Triage RN [facility]  11:59 AM 10/1/2024

## 2024-10-02 NOTE — TELEPHONE ENCOUNTER
Calcium and creatinine were done within past 1 year so they are up to date. She will need labs updated for next injection

## 2024-10-02 NOTE — TELEPHONE ENCOUNTER
Patient is having no breast lump, pain, nipple discharge or other breast symptoms.  She has never had any breast surgery and specifically wants a 3D mammogram. BERNA Alcazar R.N.

## 2024-10-08 ENCOUNTER — HOSPITAL ENCOUNTER (OUTPATIENT)
Dept: MAMMOGRAPHY | Facility: CLINIC | Age: 69
Discharge: HOME | End: 2024-10-08
Payer: COMMERCIAL

## 2024-10-08 ENCOUNTER — ALLIED HEALTH/NURSE VISIT (OUTPATIENT)
Dept: NURSING | Facility: CLINIC | Age: 69
End: 2024-10-08
Payer: COMMERCIAL

## 2024-10-08 ENCOUNTER — TELEPHONE (OUTPATIENT)
Dept: INTERNAL MEDICINE | Facility: CLINIC | Age: 69
End: 2024-10-08

## 2024-10-08 DIAGNOSIS — M81.0 OSTEOPOROSIS WITHOUT CURRENT PATHOLOGICAL FRACTURE, UNSPECIFIED OSTEOPOROSIS TYPE: Primary | ICD-10-CM

## 2024-10-08 DIAGNOSIS — T88.7XXS UNSPECIFIED ADVERSE EFFECT OF DRUG OR MEDICAMENT, SEQUELA (CODE): ICD-10-CM

## 2024-10-08 DIAGNOSIS — Z12.31 ENCOUNTER FOR SCREENING MAMMOGRAM FOR BREAST CANCER: ICD-10-CM

## 2024-10-08 PROCEDURE — 99207 PR NO CHARGE NURSE ONLY: CPT

## 2024-10-08 PROCEDURE — 77063 BREAST TOMOSYNTHESIS BI: CPT

## 2024-10-08 PROCEDURE — 96372 THER/PROPH/DIAG INJ SC/IM: CPT

## 2024-10-08 NOTE — TELEPHONE ENCOUNTER
Prolia Checklist:  Future Appt Scheduled for Injection: 04/10/25  Last Injection: 10/08/24  Coverage: Completed Tue Oct 08, 2024 1340, originally scheduled to end Thu Dec 26, 2024 1690   Labs: 03/27/24  Medication Order: Patient has no Prolia refills remaining. Medication pended. Thank you.

## 2024-10-08 NOTE — TELEPHONE ENCOUNTER
Patient scheduled for labs and next injection.     Summer RN 3:28 PM October 8, 2024   [facility]

## 2024-10-08 NOTE — TELEPHONE ENCOUNTER
Called patient back to reschedule appointment for today.   Labs are not needed for today's injection but for the next one.   Patient verbalized understanding and scheduled for Prolia injection today at 2:45 PM.

## 2024-10-08 NOTE — TELEPHONE ENCOUNTER
Prolia injection needs to be rescheduled. Labs were ordered but not done.     Left a message to patient to call back. Please inform patient that she needs labs updated for next injection. Assist in scheduling a lab appt.

## 2024-10-08 NOTE — PROGRESS NOTES
Clinic Administered Medication Documentation      Prolia Documentation    Indication: Prolia  (denosumab) is a prescription medicine used to treat osteoporosis in patients who:   Are at high risk for fracture, meaning patients who have had a fracture related to osteoporosis, or who have multiple risk factors for fracture.  Cannot use another osteoporosis medicine or other osteoporosis medicines did not work well.  The timeline for early/late injections would be 4 weeks early and any time after the 6 month nina. If a patient receives their injection late, then the subsequent injection would be 6 months from the date that they actually received the injection.    When was the last injection?  2024  Was the last injection at least 6 months ago? Yes  Has the prior authorization been completed?  Yes  Is there an active order (written within the past 365 days, with administrations remaining, not ) in the chart?  Yes   GFR Estimate   Date Value Ref Range Status   2023 73 >60 mL/min/1.73m2 Final   2021 84 >60 mL/min/[1.73_m2] Final     Comment:     Non  GFR Calc  Starting 2018, serum creatinine based estimated GFR (eGFR) will be   calculated using the Chronic Kidney Disease Epidemiology Collaboration   (CKD-EPI) equation.       Has patient had a GFR within the last 12 months? Yes   Is GFR under 30, or patient has a diagnosis of CKD4 or CKD5? No   Patient denies gastric bypass or parathyroid surgery in past 6 months? Yes - patient denies.   Patient denies dental work in the past two months involving drilling into the bone, such as implants/extractions, oral surgery or a tooth extraction that has not healed yet?  Yes  Patient denies plans for an emergency tooth extraction within the next week? Yes    The following steps were completed to comply with the REMS program for Prolia:  Reviewed information in the Medication Guide, including the serious risks of Prolia  and the symptoms  of each risk.  Advised patient to seek prompt medical attention if they have signs or symptoms of any of the serious risks.  Provided each patient a copy of the Medication Guide and Patient Guide.    Prior to injection, verified patient identity using patient's name and date of birth. Medication was administered. Please see MAR and medication order for additional information. Patient instructed to remain in clinic for 15 minutes and report any adverse reaction to staff immediately.    Vial/Syringe: Syringe  Was this medication supplied by the patient? No  Patient has no refills remaining. Refill encounter opened, order pended and Routed to the provider

## 2025-03-13 ENCOUNTER — TELEPHONE (OUTPATIENT)
Dept: INTERNAL MEDICINE | Facility: CLINIC | Age: 70
End: 2025-03-13
Payer: COMMERCIAL

## 2025-03-13 NOTE — TELEPHONE ENCOUNTER
Prolia Appointment Checklist:  Future appointment for Prolia injection: 4/10/25  Last injection date:  10/8/24   Was last injection at least 6 months ago: Yes   Insurance approval received: Yes/No: Yes  Labs:   GFR Estimate   Date Value Ref Range Status   2023 73 >60 mL/min/1.73m2 Final   2021 84 >60 mL/min/[1.73_m2] Final     Comment:     Non  GFR Calc  Starting 2018, serum creatinine based estimated GFR (eGFR) will be   calculated using the Chronic Kidney Disease Epidemiology Collaboration   (CKD-EPI) equation.       Is there a GFR result within 12 months of the injection? Yes/No: No - patient needs to be scheduled for lab only appointment before Prolia injection. Check chart for future order and request order if needed.  Patient has an upcoming appointment for lab work on 3/27/25.  Is the GFR under 30 or does the patient have a diagnosis of CKD4 or CKD5 No  Prolia CAM order in place and will not  before appointment: Yes

## 2025-03-24 ENCOUNTER — PATIENT OUTREACH (OUTPATIENT)
Dept: CARE COORDINATION | Facility: CLINIC | Age: 70
End: 2025-03-24
Payer: COMMERCIAL

## 2025-04-03 ENCOUNTER — LAB (OUTPATIENT)
Dept: LAB | Facility: CLINIC | Age: 70
End: 2025-04-03
Payer: COMMERCIAL

## 2025-04-03 DIAGNOSIS — T88.7XXS UNSPECIFIED ADVERSE EFFECT OF DRUG OR MEDICAMENT, SEQUELA (CODE): ICD-10-CM

## 2025-04-03 DIAGNOSIS — E78.5 HYPERLIPIDEMIA LDL GOAL <130: ICD-10-CM

## 2025-04-03 DIAGNOSIS — M81.0 OSTEOPOROSIS WITHOUT CURRENT PATHOLOGICAL FRACTURE, UNSPECIFIED OSTEOPOROSIS TYPE: ICD-10-CM

## 2025-04-03 LAB
ANION GAP SERPL CALCULATED.3IONS-SCNC: 10 MMOL/L (ref 7–15)
BUN SERPL-MCNC: 20.9 MG/DL (ref 8–23)
CALCIUM SERPL-MCNC: 9.2 MG/DL (ref 8.8–10.4)
CALCIUM SERPL-MCNC: 9.2 MG/DL (ref 8.8–10.4)
CHLORIDE SERPL-SCNC: 108 MMOL/L (ref 98–107)
CHOLEST SERPL-MCNC: 200 MG/DL
CREAT SERPL-MCNC: 0.73 MG/DL (ref 0.51–0.95)
EGFRCR SERPLBLD CKD-EPI 2021: 89 ML/MIN/1.73M2
FASTING STATUS PATIENT QL REPORTED: YES
FASTING STATUS PATIENT QL REPORTED: YES
GLUCOSE SERPL-MCNC: 92 MG/DL (ref 70–99)
HCO3 SERPL-SCNC: 25 MMOL/L (ref 22–29)
HDLC SERPL-MCNC: 51 MG/DL
LDLC SERPL CALC-MCNC: 131 MG/DL
NONHDLC SERPL-MCNC: 149 MG/DL
POTASSIUM SERPL-SCNC: 4.5 MMOL/L (ref 3.4–5.3)
SODIUM SERPL-SCNC: 143 MMOL/L (ref 135–145)
TRIGL SERPL-MCNC: 91 MG/DL
VIT D+METAB SERPL-MCNC: 28 NG/ML (ref 20–50)

## 2025-04-10 ENCOUNTER — ALLIED HEALTH/NURSE VISIT (OUTPATIENT)
Dept: NURSING | Facility: CLINIC | Age: 70
End: 2025-04-10
Payer: COMMERCIAL

## 2025-04-10 DIAGNOSIS — M81.0 OSTEOPOROSIS: Primary | ICD-10-CM

## 2025-04-10 NOTE — PROGRESS NOTES
Clinic Administered Medication Documentation      Prolia Documentation    Indication: Prolia  (denosumab) is a prescription medicine used to treat osteoporosis in patients who:   Are at high risk for fracture, meaning patients who have had a fracture related to osteoporosis, or who have multiple risk factors for fracture.  Cannot use another osteoporosis medicine or other osteoporosis medicines did not work well.  The timeline for early/late injections would be 4 weeks early and any time after the 6 month nina. If a patient receives their injection late, then the subsequent injection would be 6 months from the date that they actually received the injection.    When was the last injection?  10/8/24  Was the last injection at least 6 months ago? Yes  Has the prior authorization been completed?  Yes  Is there an active order (written within the past 365 days, with administrations remaining, not ) in the chart?  Yes   GFR Estimate   Date Value Ref Range Status   2025 89 >60 mL/min/1.73m2 Final     Comment:     eGFR calculated using  CKD-EPI equation.   2021 84 >60 mL/min/[1.73_m2] Final     Comment:     Non  GFR Calc  Starting 2018, serum creatinine based estimated GFR (eGFR) will be   calculated using the Chronic Kidney Disease Epidemiology Collaboration   (CKD-EPI) equation.       Has patient had a GFR within the last 12 months? Yes   Is GFR under 30, or patient has a diagnosis of CKD4 or CKD5? No   Patient denies gastric bypass or parathyroid surgery in past 6 months? Yes - patient denies.   Patient denies undergoing any dental procedures involving drilling into the bone, such as implants, extractions, or oral surgery, within the past two months that have not yet healed?  Yes - patient denies  Patient denies plans for an emergency tooth extraction within the next week? Yes    The following steps were completed to comply with the REMS program for Prolia:  Reviewed  information in the Medication Guide, including the serious risks of Prolia  and the symptoms of each risk.  Advised patient to seek prompt medical attention if they have signs or symptoms of any of the serious risks.  Provided each patient a copy of the Medication Guide and Patient Guide.    Prior to injection, verified patient identity using patient's name and date of birth. Medication was administered. Please see MAR and medication order for additional information. Patient instructed to remain in clinic for 15 minutes and report any adverse reaction to staff immediately.    Vial/Syringe: Syringe  Was this medication supplied by the patient? No  Verified that the patient has administrations remaining in their prescription.

## 2025-06-02 SDOH — HEALTH STABILITY: PHYSICAL HEALTH: ON AVERAGE, HOW MANY MINUTES DO YOU ENGAGE IN EXERCISE AT THIS LEVEL?: 20 MIN

## 2025-06-02 SDOH — HEALTH STABILITY: PHYSICAL HEALTH: ON AVERAGE, HOW MANY DAYS PER WEEK DO YOU ENGAGE IN MODERATE TO STRENUOUS EXERCISE (LIKE A BRISK WALK)?: 5 DAYS

## 2025-06-02 ASSESSMENT — SOCIAL DETERMINANTS OF HEALTH (SDOH): HOW OFTEN DO YOU GET TOGETHER WITH FRIENDS OR RELATIVES?: ONCE A WEEK

## 2025-06-03 ENCOUNTER — OFFICE VISIT (OUTPATIENT)
Dept: INTERNAL MEDICINE | Facility: CLINIC | Age: 70
End: 2025-06-03
Payer: COMMERCIAL

## 2025-06-03 VITALS
OXYGEN SATURATION: 99 % | TEMPERATURE: 97.7 F | RESPIRATION RATE: 16 BRPM | BODY MASS INDEX: 23.57 KG/M2 | HEART RATE: 66 BPM | DIASTOLIC BLOOD PRESSURE: 74 MMHG | WEIGHT: 133 LBS | SYSTOLIC BLOOD PRESSURE: 130 MMHG | HEIGHT: 63 IN

## 2025-06-03 DIAGNOSIS — M81.0 OSTEOPOROSIS WITHOUT CURRENT PATHOLOGICAL FRACTURE, UNSPECIFIED OSTEOPOROSIS TYPE: ICD-10-CM

## 2025-06-03 DIAGNOSIS — E78.5 HYPERLIPIDEMIA LDL GOAL <130: ICD-10-CM

## 2025-06-03 DIAGNOSIS — Z00.00 ENCOUNTER FOR MEDICARE ANNUAL WELLNESS EXAM: Primary | ICD-10-CM

## 2025-06-03 DIAGNOSIS — I10 BENIGN ESSENTIAL HYPERTENSION: ICD-10-CM

## 2025-06-03 DIAGNOSIS — E03.9 ACQUIRED HYPOTHYROIDISM: ICD-10-CM

## 2025-06-03 DIAGNOSIS — Z13.0 SCREENING FOR IRON DEFICIENCY ANEMIA: ICD-10-CM

## 2025-06-03 LAB
CREAT UR-MCNC: 51.8 MG/DL
ERYTHROCYTE [DISTWIDTH] IN BLOOD BY AUTOMATED COUNT: 12.1 % (ref 10–15)
HCT VFR BLD AUTO: 37.8 % (ref 35–47)
HGB BLD-MCNC: 13 G/DL (ref 11.7–15.7)
MCH RBC QN AUTO: 30.7 PG (ref 26.5–33)
MCHC RBC AUTO-ENTMCNC: 34.4 G/DL (ref 31.5–36.5)
MCV RBC AUTO: 89 FL (ref 78–100)
MICROALBUMIN UR-MCNC: <12 MG/L
MICROALBUMIN/CREAT UR: NORMAL MG/G{CREAT}
PLATELET # BLD AUTO: 220 10E3/UL (ref 150–450)
RBC # BLD AUTO: 4.23 10E6/UL (ref 3.8–5.2)
TSH SERPL DL<=0.005 MIU/L-ACNC: 0.88 UIU/ML (ref 0.3–4.2)
WBC # BLD AUTO: 6 10E3/UL (ref 4–11)

## 2025-06-03 PROCEDURE — 84443 ASSAY THYROID STIM HORMONE: CPT

## 2025-06-03 PROCEDURE — 99214 OFFICE O/P EST MOD 30 MIN: CPT | Mod: 25

## 2025-06-03 PROCEDURE — 85027 COMPLETE CBC AUTOMATED: CPT

## 2025-06-03 PROCEDURE — G0438 PPPS, INITIAL VISIT: HCPCS

## 2025-06-03 PROCEDURE — 82043 UR ALBUMIN QUANTITATIVE: CPT

## 2025-06-03 PROCEDURE — G2211 COMPLEX E/M VISIT ADD ON: HCPCS

## 2025-06-03 PROCEDURE — 3078F DIAST BP <80 MM HG: CPT

## 2025-06-03 PROCEDURE — 82570 ASSAY OF URINE CREATININE: CPT

## 2025-06-03 PROCEDURE — 36415 COLL VENOUS BLD VENIPUNCTURE: CPT

## 2025-06-03 PROCEDURE — 3075F SYST BP GE 130 - 139MM HG: CPT

## 2025-06-03 RX ORDER — LEVOTHYROXINE SODIUM 88 UG/1
TABLET ORAL
Qty: 96 TABLET | Refills: 3 | Status: SHIPPED | OUTPATIENT
Start: 2025-06-03

## 2025-06-03 RX ORDER — LISINOPRIL 5 MG/1
5 TABLET ORAL DAILY
Qty: 90 TABLET | Refills: 3 | Status: SHIPPED | OUTPATIENT
Start: 2025-06-03

## 2025-06-03 NOTE — PROGRESS NOTES
Preventive Care Visit  [facility]  GUNNER Uribe CNP, Internal Medicine  Lui 3, 2025      Assessment & Plan     (Z00.00) Encounter for Medicare annual wellness exam  (primary encounter diagnosis)  Comment:     Approximately 2-3 months ago, the patient experienced an episode of left-sided chest soreness, characterized by pain during both inspiration and expiration. She denied any associated nausea or diaphoresis. The area was tender to palpation on the left chest. The episode occurred while she was driving. She took ibuprofen, which alleviated the symptoms. We discussed that the symptoms are likely musculoskeletal in origin. However, I advised her to inform me if the symptoms persist, as we may then consider conducting a stress test.    Plan:     (E03.9) Acquired hypothyroidism  Comment:  Plan: TSH WITH FREE T4 REFLEX, levothyroxine         (SYNTHROID/LEVOTHROID) 88 MCG tablet            (Z13.0) Screening for iron deficiency anemia  Comment:   Plan: CBC with platelets            (I10) Benign essential hypertension  Comment: BP stable  Plan: Albumin Random Urine Quantitative with Creat         Ratio, lisinopril (ZESTRIL) 5 MG tablet            (M81.0) Osteoporosis without current pathological fracture, unspecified osteoporosis type  Comment: I recommended an increase in her Vitamin D supplementation, as her recent serum Vitamin D level was 28 ng/mL. The patient was initiated on Prolia (denosumab) therapy in October 2020. She is scheduled for a repeat DXA scan in November 2025. I advised her to plan for her next Prolia injection in October 2024, after which we will reassess her treatment plan based on the updated DXA results.   Plan:     (E78.5) Hyperlipidemia LDL goal <130  Comment: She is agreeable to obtaining coronary calcium CT scan.  Her mother had history of CAD and CABG. ASCVD risk score is 10.7%    The 10-year ASCVD risk score (Nitesh BANSAL, et al., 2019) is: 10.7%    Values used to  calculate the score:      Age: 69 years      Sex: Female      Is Non- : No      Diabetic: No      Tobacco smoker: No      Systolic Blood Pressure: 130 mmHg      Is BP treated: Yes      HDL Cholesterol: 51 mg/dL      Total Cholesterol: 200 mg/dL    Plan: CT Coronary Calcium Scan          The longitudinal plan of care for the diagnosis(es)/condition(s) as documented were addressed during this visit. Due to the added complexity in care, I will continue to support Le in the subsequent management and with ongoing continuity of care.       Counseling  Appropriate preventive services were addressed with this patient via screening, questionnaire, or discussion as appropriate for fall prevention, nutrition, physical activity, Tobacco-use cessation, social engagement, weight loss and cognition.  Checklist reviewing preventive services available has been given to the patient.  Reviewed patient's diet, addressing concerns and/or questions.   She is at risk for psychosocial distress and has been provided with information to reduce risk.         Subjective   Le is a 69 year old, presenting for the following:  Wellness Visit        6/3/2025     1:17 PM   Additional Questions   Roomed by Shanti GARCIA           Advance Care Planning    Discussed advance care planning with patient; however, patient declined at this time.        6/2/2025   General Health   How would you rate your overall physical health? Good   Feel stress (tense, anxious, or unable to sleep) To some extent   (!) STRESS CONCERN      6/2/2025   Nutrition   Diet: Regular (no restrictions)    Low fat/cholesterol       Multiple values from one day are sorted in reverse-chronological order         6/2/2025   Exercise   Days per week of moderate/strenous exercise 5 days   Average minutes spent exercising at this level 20 min         6/2/2025   Social Factors   Frequency of gathering with friends or relatives Once a week   Worry food  won't last until get money to buy more No   Food not last or not have enough money for food? No   Do you have housing? (Housing is defined as stable permanent housing and does not include staying outside in a car, in a tent, in an abandoned building, in an overnight shelter, or couch-surfing.) Yes   Are you worried about losing your housing? No   Lack of transportation? No   Unable to get utilities (heat,electricity)? Yes   Want help with housing or utility concern? No   (!) FINANCIAL RESOURCE STRAIN CONCERN      6/2/2025   Fall Risk   Fallen 2 or more times in the past year? No   Trouble with walking or balance? No          6/2/2025   Activities of Daily Living- Home Safety   Needs help with the following daily activites None of the above   Safety concerns in the home None of the above         6/2/2025   Dental   Dentist two times every year? Yes         6/2/2025   Hearing Screening   Hearing concerns? None of the above         6/2/2025   Driving Risk Screening   Patient/family members have concerns about driving No         6/2/2025   General Alertness/Fatigue Screening   Have you been more tired than usual lately? No         6/2/2025   Urinary Incontinence Screening   Bothered by leaking urine in past 6 months No         Today's PHQ-2 Score:       6/2/2025     2:16 PM   PHQ-2 ( 1999 [employer])   Q1: Little interest or pleasure in doing things 0   Q2: Feeling down, depressed or hopeless 1   PHQ-2 Score 1    Q1: Little interest or pleasure in doing things Not at all   Q2: Feeling down, depressed or hopeless Several days   PHQ-2 Score 1       Patient-reported           6/2/2025   Substance Use   Alcohol more than 3/day or more than 7/wk Not Applicable   Do you have a current opioid prescription? No   How severe/bad is pain from 1 to 10? 0/10 (No Pain)   Do you use any other substances recreationally? (!) XANAX OR OTHER BENZOS     Social History     Tobacco Use    Smoking status: Never    Smokeless tobacco: Never    Vaping Use    Vaping status: Never Used   Substance Use Topics    Alcohol use: No    Drug use: No           10/8/2024   LAST FHS-7 RESULTS   1st degree relative breast or ovarian cancer No              History of abnormal Pap smear: No - age 65 or older with adequate negative prior screening test results (3 consecutive negative cytology results, 2 consecutive negative cotesting results, or 2 consecutive negative HrHPV test results within 10 years, with the most recent test occurring within the recommended screening interval for the test used)        Latest Ref Rng & Units 9/8/2016    10:40 AM 5/30/2013    12:00 AM 3/1/2010    12:00 AM   PAP / HPV   PAP (Historical)  NIL  NIL  NIL    HPV 16 DNA NEG Negative      HPV 18 DNA NEG Negative      Other HR HPV NEG Negative        ASCVD Risk   The 10-year ASCVD risk score (Nitesh BANSAL, et al., 2019) is: 10.7%    Values used to calculate the score:      Age: 69 years      Sex: Female      Is Non- : No      Diabetic: No      Tobacco smoker: No      Systolic Blood Pressure: 130 mmHg      Is BP treated: Yes      HDL Cholesterol: 51 mg/dL      Total Cholesterol: 200 mg/dL            Reviewed and updated as needed this visit by Provider                      Current providers sharing in care for this patient include:  Patient Care Team:  Elizabeth Mata APRN CNP as PCP - General (Internal Medicine)  Elizabeth Mata APRN CNP as Assigned PCP    The following health maintenance items are reviewed in Epic and correct as of today:  Health Maintenance   Topic Date Due    COVID-19 VACCINE (7 - 2024-25 season) 09/01/2024    ANNUAL REVIEW OF HM ORDERS  09/19/2024    MICROALBUMIN  09/25/2024    TSH W/FREE T4 REFLEX  03/12/2025    MEDICARE ANNUAL WELLNESS VISIT  04/23/2025    COLORECTAL CANCER SCREENING  08/20/2025    BMP  04/03/2026    FALL RISK ASSESSMENT  06/03/2026    DTAP/TDAP/TD VACCINE (2 - Td or Tdap) 09/08/2026    MAMMO SCREENING  10/08/2026    DIABETES  "SCREENING  04/03/2028    LIPID  04/03/2030    ADVANCE CARE PLANNING  06/03/2030    RSV VACCINE (1 - 1-dose 75+ series) 06/07/2031    DEXA  11/16/2038    HEPATITIS C SCREENING  Completed    PHQ-2 (once per calendar year)  Completed    INFLUENZA VACCINE  Completed    PNEUMOCOCCAL VACCINE 50+ YEARS  Completed    HPV VACCINE  Aged Out    MENINGITIS VACCINE  Aged Out    PAP  Discontinued    ZOSTER VACCINE  Discontinued            Objective    Exam  /74   Pulse 66   Temp 97.7  F (36.5  C) (Tympanic)   Resp 16   Ht 1.594 m (5' 2.75\")   Wt 60.3 kg (133 lb)   LMP 03/01/2004 (Exact Date)   SpO2 99%   BMI 23.75 kg/m     Estimated body mass index is 23.75 kg/m  as calculated from the following:    Height as of this encounter: 1.594 m (5' 2.75\").    Weight as of this encounter: 60.3 kg (133 lb).        Physical Exam  Constitutional:       General: She is not in acute distress.     Appearance: Normal appearance. She is not ill-appearing, toxic-appearing or diaphoretic.   HENT:      Head: Normocephalic and atraumatic.   Eyes:      Conjunctiva/sclera: Conjunctivae normal.   Cardiovascular:      Rate and Rhythm: Normal rate and regular rhythm.      Heart sounds: Normal heart sounds.   Pulmonary:      Effort: Pulmonary effort is normal.      Breath sounds: Normal breath sounds.   Skin:     General: Skin is warm and dry.   Neurological:      Mental Status: She is alert and oriented to person, place, and time.   Psychiatric:         Mood and Affect: Mood normal.         Behavior: Behavior normal.         Thought Content: Thought content normal.         Judgment: Judgment normal.            6/3/2025   Mini Cog   Clock Draw Score 2 Normal   3 Item Recall 3 objects recalled   Mini Cog Total Score 5              Signed Electronically by: GUNNER Uribe CNP    "

## 2025-06-05 ENCOUNTER — RESULTS FOLLOW-UP (OUTPATIENT)
Dept: INTERNAL MEDICINE | Facility: CLINIC | Age: 70
End: 2025-06-05

## 2025-06-05 DIAGNOSIS — M81.0 OSTEOPOROSIS WITHOUT CURRENT PATHOLOGICAL FRACTURE, UNSPECIFIED OSTEOPOROSIS TYPE: Primary | ICD-10-CM

## 2025-07-21 ENCOUNTER — PATIENT OUTREACH (OUTPATIENT)
Dept: CARE COORDINATION | Facility: CLINIC | Age: 70
End: 2025-07-21
Payer: COMMERCIAL

## 2025-08-07 ENCOUNTER — TRANSFERRED RECORDS (OUTPATIENT)
Dept: ADMINISTRATIVE | Facility: CLINIC | Age: 70
End: 2025-08-07
Payer: COMMERCIAL

## 2025-08-07 PROBLEM — D12.6 COLON ADENOMA: Status: ACTIVE | Noted: 2025-08-07

## 2025-08-14 ENCOUNTER — LAB (OUTPATIENT)
Dept: LAB | Facility: CLINIC | Age: 70
End: 2025-08-14
Payer: COMMERCIAL

## 2025-08-14 DIAGNOSIS — M81.0 OSTEOPOROSIS WITHOUT CURRENT PATHOLOGICAL FRACTURE, UNSPECIFIED OSTEOPOROSIS TYPE: ICD-10-CM

## 2025-08-21 ENCOUNTER — HOSPITAL ENCOUNTER (OUTPATIENT)
Dept: CT IMAGING | Facility: CLINIC | Age: 70
End: 2025-08-21
Payer: COMMERCIAL

## 2025-08-21 DIAGNOSIS — E78.5 HYPERLIPIDEMIA LDL GOAL <130: ICD-10-CM

## 2025-08-21 PROCEDURE — 75571 CT HRT W/O DYE W/CA TEST: CPT

## 2025-08-25 ENCOUNTER — MYC MEDICAL ADVICE (OUTPATIENT)
Dept: INTERNAL MEDICINE | Facility: CLINIC | Age: 70
End: 2025-08-25
Payer: COMMERCIAL

## 2025-08-26 ENCOUNTER — RESULTS FOLLOW-UP (OUTPATIENT)
Dept: INTERNAL MEDICINE | Facility: CLINIC | Age: 70
End: 2025-08-26
Payer: COMMERCIAL

## 2025-08-26 DIAGNOSIS — E78.5 HYPERLIPIDEMIA LDL GOAL <130: Primary | ICD-10-CM

## 2025-08-26 RX ORDER — ATORVASTATIN CALCIUM 10 MG/1
10 TABLET, FILM COATED ORAL DAILY
Qty: 90 TABLET | Refills: 0 | Status: SHIPPED | OUTPATIENT
Start: 2025-08-26